# Patient Record
Sex: MALE | Race: WHITE | NOT HISPANIC OR LATINO | Employment: OTHER | URBAN - METROPOLITAN AREA
[De-identification: names, ages, dates, MRNs, and addresses within clinical notes are randomized per-mention and may not be internally consistent; named-entity substitution may affect disease eponyms.]

---

## 2017-08-29 ENCOUNTER — HISTORICAL (OUTPATIENT)
Dept: ADMINISTRATIVE | Facility: HOSPITAL | Age: 82
End: 2017-08-29

## 2021-03-01 ENCOUNTER — HISTORICAL (OUTPATIENT)
Dept: CARDIOLOGY | Facility: HOSPITAL | Age: 86
End: 2021-03-01

## 2021-04-20 ENCOUNTER — HISTORICAL (OUTPATIENT)
Dept: ADMINISTRATIVE | Facility: HOSPITAL | Age: 86
End: 2021-04-20

## 2021-04-20 LAB
ABS NEUT (OLG): 3.18 X10(3)/MCL (ref 2.1–9.2)
BASOPHILS # BLD AUTO: 0 X10(3)/MCL (ref 0–0.2)
BASOPHILS NFR BLD AUTO: 0 %
BUN SERPL-MCNC: 30.5 MG/DL (ref 8.4–25.7)
CALCIUM SERPL-MCNC: 9.5 MG/DL (ref 8.8–10)
CHLORIDE SERPL-SCNC: 108 MMOL/L (ref 98–107)
CHOLEST SERPL-MCNC: 147 MG/DL
CHOLEST/HDLC SERPL: 2 {RATIO} (ref 0–5)
CO2 SERPL-SCNC: 26 MMOL/L (ref 23–31)
CREAT SERPL-MCNC: 1.21 MG/DL (ref 0.73–1.18)
CREAT/UREA NIT SERPL: 25
EOSINOPHIL # BLD AUTO: 0.3 X10(3)/MCL (ref 0–0.9)
EOSINOPHIL NFR BLD AUTO: 6 %
ERYTHROCYTE [DISTWIDTH] IN BLOOD BY AUTOMATED COUNT: 14.2 % (ref 11.5–17)
GLUCOSE SERPL-MCNC: 87 MG/DL (ref 82–115)
HCT VFR BLD AUTO: 43.5 % (ref 42–52)
HDLC SERPL-MCNC: 63 MG/DL (ref 35–60)
HGB BLD-MCNC: 13.4 GM/DL (ref 14–18)
LDLC SERPL CALC-MCNC: 72 MG/DL (ref 50–140)
LYMPHOCYTES # BLD AUTO: 1.6 X10(3)/MCL (ref 0.6–4.6)
LYMPHOCYTES NFR BLD AUTO: 27 %
MCH RBC QN AUTO: 30.9 PG (ref 27–31)
MCHC RBC AUTO-ENTMCNC: 30.8 GM/DL (ref 33–36)
MCV RBC AUTO: 100.5 FL (ref 80–94)
MONOCYTES # BLD AUTO: 0.7 X10(3)/MCL (ref 0.1–1.3)
MONOCYTES NFR BLD AUTO: 12 %
NEUTROPHILS # BLD AUTO: 3.18 X10(3)/MCL (ref 2.1–9.2)
NEUTROPHILS NFR BLD AUTO: 55 %
PLATELET # BLD AUTO: 242 X10(3)/MCL (ref 130–400)
PMV BLD AUTO: 11.9 FL (ref 9.4–12.4)
POTASSIUM SERPL-SCNC: 4.4 MMOL/L (ref 3.5–5.1)
RBC # BLD AUTO: 4.33 X10(6)/MCL (ref 4.7–6.1)
SODIUM SERPL-SCNC: 146 MMOL/L (ref 136–145)
TRIGL SERPL-MCNC: 59 MG/DL (ref 34–140)
VLDLC SERPL CALC-MCNC: 12 MG/DL
WBC # SPEC AUTO: 5.8 X10(3)/MCL (ref 4.5–11.5)

## 2021-04-28 ENCOUNTER — HISTORICAL (OUTPATIENT)
Dept: ADMINISTRATIVE | Facility: HOSPITAL | Age: 86
End: 2021-04-28

## 2021-04-28 LAB
BUN SERPL-MCNC: 23.5 MG/DL (ref 8.4–25.7)
CALCIUM SERPL-MCNC: 9.7 MG/DL (ref 8.8–10)
CHLORIDE SERPL-SCNC: 109 MMOL/L (ref 98–107)
CO2 SERPL-SCNC: 28 MMOL/L (ref 23–31)
CREAT SERPL-MCNC: 1.38 MG/DL (ref 0.73–1.18)
CREAT/UREA NIT SERPL: 17
GLUCOSE SERPL-MCNC: 95 MG/DL (ref 82–115)
POTASSIUM SERPL-SCNC: 4.7 MMOL/L (ref 3.5–5.1)
SODIUM SERPL-SCNC: 145 MMOL/L (ref 136–145)

## 2021-11-05 ENCOUNTER — HISTORICAL (OUTPATIENT)
Dept: LAB | Facility: HOSPITAL | Age: 86
End: 2021-11-05

## 2021-11-05 LAB
BUN SERPL-MCNC: 25.4 MG/DL (ref 8.4–25.7)
CALCIUM SERPL-MCNC: 9.6 MG/DL (ref 8.7–10.5)
CHLORIDE SERPL-SCNC: 106 MMOL/L (ref 98–107)
CO2 SERPL-SCNC: 24 MMOL/L (ref 23–31)
CREAT SERPL-MCNC: 1.36 MG/DL (ref 0.73–1.18)
CREAT/UREA NIT SERPL: 19
GLUCOSE SERPL-MCNC: 93 MG/DL (ref 82–115)
POTASSIUM SERPL-SCNC: 4.4 MMOL/L (ref 3.5–5.1)
SODIUM SERPL-SCNC: 139 MMOL/L (ref 136–145)

## 2022-04-11 ENCOUNTER — HISTORICAL (OUTPATIENT)
Dept: ADMINISTRATIVE | Facility: HOSPITAL | Age: 87
End: 2022-04-11
Payer: COMMERCIAL

## 2022-04-28 VITALS
SYSTOLIC BLOOD PRESSURE: 130 MMHG | HEIGHT: 66 IN | BODY MASS INDEX: 30.18 KG/M2 | DIASTOLIC BLOOD PRESSURE: 58 MMHG | OXYGEN SATURATION: 98 % | WEIGHT: 187.81 LBS

## 2022-04-30 NOTE — OP NOTE
Patient:   Yusef Dodd             MRN: [id number]            FIN: [id number]               Age:   83 years     Sex:  Male     :  10/15/1933   Associated Diagnoses:   None   Author:   Francesco York MD      Operative Note   Operative Information   Surgeon: Francesco York MD.     Preoperative Diagnosis: Nuclear sclerotic cataract                  OS    Postoperative Diagnosis:  Same    Anesthesia:   Local   MAC      The patient was diagnosed with a significant cataract.         OS    The patient desired and I recommended surgical correction with laser-assisted cataract surgery.  After the patient's pupil was dilated and anesthetic drops were placed in the eye, ink marks were placed at 0 degrees, 90 degrees, 180 degrees on the conjunctivae using a titanium marker.     After the patient was transferred to the laser, properly positioned on the laser bed and the plan was reviewed, the Laser Optic Interface (L.O.I.) was aligned with the pre-placed marks and vacuum was employed.  Basic saline solution was used to fill the L.O.I. to the desired degree and the patient was aligned underneath the laser.  The control knob was used to raise the patient and insert the laser lens into the L.O.I.  The monitor was viewed for any bubbles that may interfere and corrections were made if necessary.  The L.O.I. was captured and locked and the eye was scanned.  After approval of the scan, the pre-programmed laser treatment was performed and completed.      The treatment was well-tolerated by the patient without any complications.  The patient was then transferred back to her stretcher and moved to the operating room.     Once in the operating room the patient was properly positioned and the head secured with paper tape.  After the microscope was positioned temporally and focused, the   OS eye was prepped and draped in sterile fashion.  After I was scrubbed, gowned, and gloved I began the operation by creating small  paracentesis port at the corneal limbus to accommodate my second instrument.  Dispersive viscoelastic was then used to fill the anterior chamber.  A Sinsky hook was then used to open the preplaced laser incision.  . The cystatome blade was then used to initiate a capsulorrhexis which was completed 360 degrees with Utrata forceps. BSS in an AC cannula was then used to perform hydrodissection and hydrodilineation. Phacoemulsification was then accomplished by creating a deep groove down the middle of the nucleus, then  it into 2 halves with the phacotip and the Powell chopper and finishing the removal with a stop and chop technique.  The cortex was then removed using the I and A unit. All of the lens material was removed without any tears to the anterior or posterior capsule. Cohesive Viscoelastic was then injected to inflate the capsular bag. A foldable lens was then injected into the capsular bag. The lens was observed to be securely placed into the bag. The I and A was then used to remove the remaining viscoelastic. A 10-0 Biosorb incisional suture was not placed. BSS through an A/C cannula was used to perform stromal hydration in the wound. BSS was also used again to reform the chamber and bring the eye to physiologic IOP.  The wound was checked for leaks and none were found. Copious Vigomox drop and 1-2 drops of, Prednisolone Acetate 1% were placed topically prior to removing the lid specular and drapes.  The drapes were then removed. The patient will be sent to recovery and instructed to use Vigamox, Ketorolac, and Predinsolone Acetate 1% three times a day as well as follow all instructions on the postoperative sheet given to them and explained after surgery. The patient will return to see Dr. York at their scheduled appointment within 24-36 hours after surgery.        SANAM Peralta/danyel

## 2022-05-05 ENCOUNTER — LAB VISIT (OUTPATIENT)
Dept: LAB | Facility: HOSPITAL | Age: 87
End: 2022-05-05
Attending: STUDENT IN AN ORGANIZED HEALTH CARE EDUCATION/TRAINING PROGRAM
Payer: MEDICARE

## 2022-05-05 DIAGNOSIS — E78.5 HYPERLIPIDEMIA, UNSPECIFIED HYPERLIPIDEMIA TYPE: ICD-10-CM

## 2022-05-05 DIAGNOSIS — Z00.00 ROUTINE GENERAL MEDICAL EXAMINATION AT A HEALTH CARE FACILITY: Primary | ICD-10-CM

## 2022-05-05 DIAGNOSIS — N18.2 CHRONIC KIDNEY DISEASE, STAGE II (MILD): ICD-10-CM

## 2022-05-05 DIAGNOSIS — M25.562 LEFT KNEE PAIN: ICD-10-CM

## 2022-05-05 LAB
ANION GAP SERPL CALC-SCNC: 8 MEQ/L
BUN SERPL-MCNC: 34.4 MG/DL (ref 8.4–25.7)
CALCIUM SERPL-MCNC: 9.9 MG/DL (ref 8.8–10)
CHLORIDE SERPL-SCNC: 109 MMOL/L (ref 98–107)
CO2 SERPL-SCNC: 26 MMOL/L (ref 23–31)
CREAT SERPL-MCNC: 1.62 MG/DL (ref 0.73–1.18)
CREAT/UREA NIT SERPL: 21
GLUCOSE SERPL-MCNC: 96 MG/DL (ref 82–115)
POTASSIUM SERPL-SCNC: 4.6 MMOL/L (ref 3.5–5.1)
SODIUM SERPL-SCNC: 143 MMOL/L (ref 136–145)

## 2022-05-05 PROCEDURE — 80048 BASIC METABOLIC PNL TOTAL CA: CPT

## 2022-05-05 PROCEDURE — 36415 COLL VENOUS BLD VENIPUNCTURE: CPT

## 2022-05-18 ENCOUNTER — OFFICE VISIT (OUTPATIENT)
Dept: INTERNAL MEDICINE | Facility: CLINIC | Age: 87
End: 2022-05-18
Payer: MEDICARE

## 2022-05-18 VITALS
BODY MASS INDEX: 27.74 KG/M2 | WEIGHT: 183 LBS | HEIGHT: 68 IN | HEART RATE: 83 BPM | OXYGEN SATURATION: 96 % | RESPIRATION RATE: 18 BRPM | SYSTOLIC BLOOD PRESSURE: 130 MMHG | DIASTOLIC BLOOD PRESSURE: 64 MMHG

## 2022-05-18 DIAGNOSIS — N18.2 STAGE 2 CHRONIC KIDNEY DISEASE: Chronic | ICD-10-CM

## 2022-05-18 DIAGNOSIS — I10 ESSENTIAL HYPERTENSION: ICD-10-CM

## 2022-05-18 DIAGNOSIS — E78.5 HYPERLIPIDEMIA, UNSPECIFIED HYPERLIPIDEMIA TYPE: Primary | Chronic | ICD-10-CM

## 2022-05-18 DIAGNOSIS — M10.9 GOUT, UNSPECIFIED CAUSE, UNSPECIFIED CHRONICITY, UNSPECIFIED SITE: Chronic | ICD-10-CM

## 2022-05-18 PROCEDURE — 99213 PR OFFICE/OUTPT VISIT, EST, LEVL III, 20-29 MIN: ICD-10-PCS | Mod: ,,, | Performed by: STUDENT IN AN ORGANIZED HEALTH CARE EDUCATION/TRAINING PROGRAM

## 2022-05-18 PROCEDURE — 99213 OFFICE O/P EST LOW 20 MIN: CPT | Mod: ,,, | Performed by: STUDENT IN AN ORGANIZED HEALTH CARE EDUCATION/TRAINING PROGRAM

## 2022-05-18 RX ORDER — LISINOPRIL 40 MG/1
40 TABLET ORAL DAILY
COMMUNITY
Start: 2022-02-23 | End: 2022-06-29

## 2022-05-18 RX ORDER — FINASTERIDE 5 MG/1
5 TABLET, FILM COATED ORAL DAILY
COMMUNITY
Start: 2022-03-23 | End: 2022-06-29 | Stop reason: SDUPTHER

## 2022-05-18 RX ORDER — LEVALBUTEROL TARTRATE 45 UG/1
AEROSOL, METERED ORAL
COMMUNITY
Start: 2021-09-13

## 2022-05-18 RX ORDER — PRAVASTATIN SODIUM 20 MG/1
20 TABLET ORAL DAILY
COMMUNITY
Start: 2022-04-02 | End: 2023-02-06

## 2022-05-18 RX ORDER — HYDRALAZINE HYDROCHLORIDE 50 MG/1
50 TABLET, FILM COATED ORAL 2 TIMES DAILY
COMMUNITY
Start: 2022-04-06 | End: 2023-03-06

## 2022-05-18 RX ORDER — ASPIRIN 81 MG/1
81 TABLET ORAL
COMMUNITY
Start: 2021-11-18 | End: 2022-05-18 | Stop reason: SINTOL

## 2022-05-18 RX ORDER — ALLOPURINOL 300 MG/1
300 TABLET ORAL DAILY
COMMUNITY
Start: 2022-03-27 | End: 2022-12-27

## 2022-05-18 RX ORDER — AMLODIPINE BESYLATE 10 MG/1
10 TABLET ORAL DAILY
COMMUNITY
Start: 2022-03-23 | End: 2022-06-29

## 2022-05-18 NOTE — ASSESSMENT & PLAN NOTE
Slight worsening in creatinine  Advised patient to stop taking aspirin and all other NSAIDs  Will recheck BMP in 1 month

## 2022-05-18 NOTE — PROGRESS NOTES
Subjective:      Yusef Dodd  05/18/2022  [id number]    Ayala Williamson MD  Patient Care Team:  Ayala Gray MD as PCP - General (Internal Medicine)          Visit Type:a scheduled routine follow-up visit    Chief Complaint: Follow-up    HPI   Mr Holbrook presents for follow up of labs. Routine BMP shows worsening of CKD. Patient has been taking aspirin, likely the cause. No complaints today.     No past medical history on file.  No past surgical history on file.  No family history on file.  Social History     Tobacco Use    Smoking status: Never Smoker    Smokeless tobacco: Never Used   Substance and Sexual Activity    Alcohol use: Not on file    Drug use: Not on file    Sexual activity: Not on file     Active Problem List with Overview Notes    Diagnosis Date Noted    Hyperlipidemia 05/18/2022    Stage 2 chronic kidney disease 05/18/2022    Gout 05/18/2022    Essential hypertension 05/18/2022     Review of patient's allergies indicates:   Allergen Reactions    Penicillin Nausea And Vomiting       The following were reviewed at this visit: active problem list, medication list, allergies, family history, social history, and health maintenance.    Medications:    Current Outpatient Medications:     allopurinoL (ZYLOPRIM) 300 MG tablet, Take 300 mg by mouth once daily., Disp: , Rfl:     amLODIPine (NORVASC) 10 MG tablet, Take 10 mg by mouth once daily., Disp: , Rfl:     finasteride (PROSCAR) 5 mg tablet, Take 5 mg by mouth once daily., Disp: , Rfl:     hydrALAZINE (APRESOLINE) 50 MG tablet, Take 50 mg by mouth 2 (two) times daily., Disp: , Rfl:     levalbuterol (XOPENEX HFA) 45 mcg/actuation inhaler, Inhale into the lungs., Disp: , Rfl:     lisinopriL (PRINIVIL,ZESTRIL) 40 MG tablet, Take 40 mg by mouth once daily., Disp: , Rfl:     pravastatin (PRAVACHOL) 20 MG tablet, Take 20 mg by mouth once daily., Disp: , Rfl:       Medications have been reviewed and reconciled with patient at  "this visit.  Barriers to medications reviewed with patient.    Adverse reactions to current medications reviewed with patient..    Over the counter medications reviewed and reconciled with patient.  Review of Systems   Constitutional: Negative for chills, diaphoresis, fever and weight loss.   HENT: Negative for congestion, ear discharge, sinus pain and sore throat.    Eyes: Negative for photophobia.   Respiratory: Negative for cough, hemoptysis and shortness of breath.    Cardiovascular: Negative for chest pain, palpitations, claudication, leg swelling and PND.   Gastrointestinal: Negative for constipation, diarrhea, nausea and vomiting.   Genitourinary: Negative for dysuria, frequency and urgency.   Musculoskeletal: Negative for back pain, joint pain, myalgias and neck pain.   Skin: Negative for itching and rash.   Neurological: Negative for dizziness, focal weakness and headaches.   Psychiatric/Behavioral: Negative for depression. The patient does not have insomnia.            Objective:      Vitals:    05/18/22 1320   BP: 130/64   Pulse: 83   Resp: 18   SpO2: 96%   Weight: 83 kg (183 lb)   Height: 5' 8" (1.727 m)       Physical Exam  Constitutional:       Appearance: Normal appearance.   HENT:      Head: Normocephalic and atraumatic.   Cardiovascular:      Rate and Rhythm: Normal rate and regular rhythm.      Pulses: Normal pulses.   Pulmonary:      Effort: Pulmonary effort is normal.      Breath sounds: Normal breath sounds.   Abdominal:      General: Abdomen is flat. Bowel sounds are normal. There is no distension.      Palpations: Abdomen is soft.      Tenderness: There is no abdominal tenderness.   Musculoskeletal:         General: No tenderness. Normal range of motion.      Right lower leg: No edema.      Left lower leg: No edema.   Lymphadenopathy:      Cervical: No cervical adenopathy.   Neurological:      General: No focal deficit present.      Mental Status: He is alert and oriented to person, place, and " time.           Laboratory Reviewed ({Yes)  Lab Results   Component Value Date    WBC 5.8 04/20/2021    HGB 13.4 (L) 04/20/2021    HCT 43.5 04/20/2021     04/20/2021    CHOL 147 04/20/2021    TRIG 59 04/20/2021    HDL 63 (H) 04/20/2021     05/05/2022    K 4.6 05/05/2022    CREATININE 1.62 (H) 05/05/2022    BUN 34.4 (H) 05/05/2022    CO2 26 05/05/2022         Assessment and Plan:       Problem List Items Addressed This Visit        Cardiac/Vascular    Hyperlipidemia - Primary (Chronic)     Continue current medication            Essential hypertension (Chronic)     Controlled  Continue current medications              Renal/    Stage 2 chronic kidney disease (Chronic)     Slight worsening in creatinine  Advised patient to stop taking aspirin and all other NSAIDs  Will recheck BMP in 1 month              Orthopedic    Gout (Chronic)     Continue current medication                   Care Plan/Goals: Reviewed    Goals    None         Follow up: Follow up in about 6 months (around 11/18/2022) for wellness.    No orders of the defined types were placed in this encounter.

## 2022-06-17 ENCOUNTER — LAB VISIT (OUTPATIENT)
Dept: LAB | Facility: HOSPITAL | Age: 87
End: 2022-06-17
Attending: STUDENT IN AN ORGANIZED HEALTH CARE EDUCATION/TRAINING PROGRAM
Payer: MEDICARE

## 2022-06-17 DIAGNOSIS — N18.2 STAGE 2 CHRONIC KIDNEY DISEASE: Chronic | ICD-10-CM

## 2022-06-17 LAB
ANION GAP SERPL CALC-SCNC: 10 MEQ/L
BUN SERPL-MCNC: 28.7 MG/DL (ref 8.4–25.7)
CALCIUM SERPL-MCNC: 9.6 MG/DL (ref 8.8–10)
CHLORIDE SERPL-SCNC: 108 MMOL/L (ref 98–107)
CO2 SERPL-SCNC: 26 MMOL/L (ref 23–31)
CREAT SERPL-MCNC: 1.41 MG/DL (ref 0.73–1.18)
CREAT/UREA NIT SERPL: 20
GLUCOSE SERPL-MCNC: 97 MG/DL (ref 82–115)
POTASSIUM SERPL-SCNC: 4.3 MMOL/L (ref 3.5–5.1)
SODIUM SERPL-SCNC: 144 MMOL/L (ref 136–145)

## 2022-06-17 PROCEDURE — 80048 BASIC METABOLIC PNL TOTAL CA: CPT

## 2022-06-17 PROCEDURE — 36415 COLL VENOUS BLD VENIPUNCTURE: CPT

## 2022-06-29 DIAGNOSIS — I10 ESSENTIAL HYPERTENSION: ICD-10-CM

## 2022-06-29 DIAGNOSIS — E78.5 HYPERLIPIDEMIA, UNSPECIFIED HYPERLIPIDEMIA TYPE: Primary | ICD-10-CM

## 2022-06-29 RX ORDER — LISINOPRIL 40 MG/1
TABLET ORAL
Qty: 30 TABLET | Refills: 0 | Status: SHIPPED | OUTPATIENT
Start: 2022-06-29 | End: 2022-08-25

## 2022-06-29 RX ORDER — AMLODIPINE BESYLATE 10 MG/1
TABLET ORAL
Qty: 90 TABLET | Refills: 0 | Status: SHIPPED | OUTPATIENT
Start: 2022-06-29 | End: 2022-10-25

## 2022-06-29 RX ORDER — FINASTERIDE 5 MG/1
5 TABLET, FILM COATED ORAL DAILY
Qty: 90 TABLET | Refills: 3 | Status: SHIPPED | OUTPATIENT
Start: 2022-06-29 | End: 2023-05-23

## 2022-07-21 DIAGNOSIS — J45.909 ASTHMA, UNSPECIFIED ASTHMA SEVERITY, UNSPECIFIED WHETHER COMPLICATED, UNSPECIFIED WHETHER PERSISTENT: Primary | ICD-10-CM

## 2022-07-21 RX ORDER — ALBUTEROL SULFATE 0.83 MG/ML
SOLUTION RESPIRATORY (INHALATION)
Qty: 300 ML | Refills: 0 | Status: SHIPPED | OUTPATIENT
Start: 2022-07-21 | End: 2022-12-27

## 2022-09-13 ENCOUNTER — HOSPITAL ENCOUNTER (EMERGENCY)
Facility: HOSPITAL | Age: 87
Discharge: HOME | End: 2022-09-13
Attending: SPECIALIST
Payer: MEDICARE

## 2022-09-13 VITALS
TEMPERATURE: 97 F | OXYGEN SATURATION: 97 % | DIASTOLIC BLOOD PRESSURE: 66 MMHG | BODY MASS INDEX: 30.31 KG/M2 | RESPIRATION RATE: 16 BRPM | WEIGHT: 200 LBS | HEART RATE: 98 BPM | HEIGHT: 68 IN | SYSTOLIC BLOOD PRESSURE: 165 MMHG

## 2022-09-13 DIAGNOSIS — M25.569 KNEE PAIN, UNSPECIFIED CHRONICITY, UNSPECIFIED LATERALITY: Primary | ICD-10-CM

## 2022-09-13 DIAGNOSIS — R53.83 FATIGUE, UNSPECIFIED TYPE: ICD-10-CM

## 2022-09-13 PROCEDURE — 99283 EMERGENCY DEPT VISIT LOW MDM: CPT

## 2022-09-13 RX ORDER — MELOXICAM 7.5 MG/1
7.5 TABLET ORAL DAILY PRN
Qty: 14 TABLET | Refills: 0 | Status: SHIPPED | OUTPATIENT
Start: 2022-09-13 | End: 2022-09-27

## 2022-09-14 NOTE — ED PROVIDER NOTES
Encounter Date: 9/13/2022       History     Chief Complaint   Patient presents with    Fatigue     Truck broke down and walked awhile sweating/ fly picked him up brought him home to change clothes then called [facility] because they said he was weak/ pt states he feels find now     Patient brought in by ambulance after his family called stating he was fatigued after his truck broke down and walking a long distance; patient resting in exam room and states he is fine and has no longer fatigue but has some knee pain that is chronic; he received 500 cc of normal saline per EMS      Review of patient's allergies indicates:   Allergen Reactions    Penicillin Nausea And Vomiting     No past medical history on file.  No past surgical history on file.  No family history on file.  Social History     Tobacco Use    Smoking status: Never    Smokeless tobacco: Never     Review of Systems   Constitutional:  Positive for fatigue.   Respiratory: Negative.     Cardiovascular: Negative.    Gastrointestinal: Negative.    Genitourinary: Negative.    Musculoskeletal:  Positive for arthralgias.   Neurological: Negative.      Physical Exam     Initial Vitals [09/13/22 2143]   BP Pulse Resp Temp SpO2   (!) 165/66 98 16 97.4 °F (36.3 °C) 97 %      MAP       --         Physical Exam    Nursing note and vitals reviewed.  Constitutional: He appears well-developed and well-nourished.   HENT:   Head: Normocephalic and atraumatic.   Eyes: EOM are normal. Pupils are equal, round, and reactive to light.   Neck: Neck supple.   Normal range of motion.  Cardiovascular:  Normal rate, regular rhythm and normal heart sounds.           Pulmonary/Chest: Breath sounds normal. No respiratory distress.   Abdominal: Abdomen is soft. Bowel sounds are normal. There is no abdominal tenderness.   Musculoskeletal:         General: Normal range of motion.      Cervical back: Normal range of motion and neck supple.      Comments: Bilateral knee pain with ambulation      Neurological: He is alert and oriented to person, place, and time.   Skin: Skin is warm and dry.       ED Course   Procedures  Labs Reviewed - No data to display       Imaging Results    None          Medications - No data to display                           Clinical Impression:   Final diagnoses:  [M25.569] Knee pain, unspecified chronicity, unspecified laterality (Primary)  [R53.83] Fatigue, unspecified type        ED Disposition Condition    Discharge Stable          ED Prescriptions       Medication Sig Dispense Start Date End Date Auth. Provider    meloxicam (MOBIC) 7.5 MG tablet Take 1 tablet (7.5 mg total) by mouth daily as needed for Pain. 14 tablet 9/13/2022 9/27/2022 Shoaib Dey MD          Follow-up Information       Follow up With Specialties Details Why Contact Info    Ayala Gray MD Internal Medicine In 2 days As needed [address] [facility] [id number]  [phone]               Shoaib Dey MD  09/14/22 6409

## 2022-09-20 DIAGNOSIS — I10 ESSENTIAL HYPERTENSION: ICD-10-CM

## 2022-09-20 RX ORDER — LISINOPRIL 40 MG/1
TABLET ORAL
Qty: 90 TABLET | Refills: 3 | Status: SHIPPED | OUTPATIENT
Start: 2022-09-20 | End: 2023-09-11 | Stop reason: SDUPTHER

## 2022-11-15 DIAGNOSIS — I10 ESSENTIAL HYPERTENSION: Primary | ICD-10-CM

## 2022-11-15 DIAGNOSIS — E78.5 HYPERLIPIDEMIA, UNSPECIFIED HYPERLIPIDEMIA TYPE: ICD-10-CM

## 2022-12-07 ENCOUNTER — OFFICE VISIT (OUTPATIENT)
Dept: INTERNAL MEDICINE | Facility: CLINIC | Age: 87
End: 2022-12-07
Payer: MEDICARE

## 2022-12-07 VITALS
WEIGHT: 180 LBS | RESPIRATION RATE: 18 BRPM | HEIGHT: 68 IN | SYSTOLIC BLOOD PRESSURE: 130 MMHG | OXYGEN SATURATION: 97 % | DIASTOLIC BLOOD PRESSURE: 72 MMHG | BODY MASS INDEX: 27.28 KG/M2 | HEART RATE: 80 BPM

## 2022-12-07 DIAGNOSIS — I10 ESSENTIAL HYPERTENSION: Chronic | ICD-10-CM

## 2022-12-07 DIAGNOSIS — N18.2 STAGE 2 CHRONIC KIDNEY DISEASE: Chronic | ICD-10-CM

## 2022-12-07 DIAGNOSIS — Z00.00 MEDICARE ANNUAL WELLNESS VISIT, SUBSEQUENT: ICD-10-CM

## 2022-12-07 DIAGNOSIS — E78.5 HYPERLIPIDEMIA, UNSPECIFIED HYPERLIPIDEMIA TYPE: Primary | ICD-10-CM

## 2022-12-07 DIAGNOSIS — M10.9 GOUT, UNSPECIFIED CAUSE, UNSPECIFIED CHRONICITY, UNSPECIFIED SITE: Chronic | ICD-10-CM

## 2022-12-07 PROCEDURE — G0439 PR MEDICARE ANNUAL WELLNESS SUBSEQUENT VISIT: ICD-10-PCS | Mod: ,,, | Performed by: STUDENT IN AN ORGANIZED HEALTH CARE EDUCATION/TRAINING PROGRAM

## 2022-12-07 PROCEDURE — G0439 PPPS, SUBSEQ VISIT: HCPCS | Mod: ,,, | Performed by: STUDENT IN AN ORGANIZED HEALTH CARE EDUCATION/TRAINING PROGRAM

## 2022-12-07 RX ORDER — ASPIRIN 81 MG/1
81 TABLET ORAL DAILY
COMMUNITY
End: 2022-12-07

## 2022-12-08 NOTE — PROGRESS NOTES
Subjective:      Yusef Dodd  12/07/2022  [id number]    Ayala Williamson MD  Patient Care Team:  Ayala Gray MD as PCP - General (Internal Medicine)          Visit Type:a scheduled routine follow-up visit    Chief Complaint: Medicare AWV    HPI  Mr Holbrook presents for Medicare wellness. Patient is doing well, denies any complaints.       History reviewed. No pertinent past medical history.  Past Surgical History:   Procedure Laterality Date    HERNIA REPAIR       History reviewed. No pertinent family history.  Social History     Tobacco Use    Smoking status: Never    Smokeless tobacco: Never   Substance and Sexual Activity    Alcohol use: Not on file    Drug use: Not on file    Sexual activity: Not on file     Active Problem List with Overview Notes    Diagnosis Date Noted    Medicare annual wellness visit, subsequent 12/07/2022    Hyperlipidemia 05/18/2022    Stage 2 chronic kidney disease 05/18/2022    Gout 05/18/2022    Essential hypertension 05/18/2022     Review of patient's allergies indicates:   Allergen Reactions    Penicillin Nausea And Vomiting       The following were reviewed at this visit: active problem list, medication list, allergies, family history, social history, and health maintenance.    Medications:    Current Outpatient Medications:     albuterol (PROVENTIL) 2.5 mg /3 mL (0.083 %) nebulizer solution, USE 1 VIAL IN NEBULIZER 4 TIMES DAILY, Disp: 300 mL, Rfl: 0    allopurinoL (ZYLOPRIM) 300 MG tablet, Take 300 mg by mouth once daily., Disp: , Rfl:     amLODIPine (NORVASC) 10 MG tablet, Take 1 tablet by mouth once daily, Disp: 90 tablet, Rfl: 3    finasteride (PROSCAR) 5 mg tablet, Take 1 tablet (5 mg total) by mouth once daily., Disp: 90 tablet, Rfl: 3    hydrALAZINE (APRESOLINE) 50 MG tablet, Take 50 mg by mouth 2 (two) times daily., Disp: , Rfl:     levalbuterol (XOPENEX HFA) 45 mcg/actuation inhaler, Inhale into the lungs., Disp: , Rfl:     lisinopriL (PRINIVIL,ZESTRIL) 40  MG tablet, Take 1 tablet by mouth once daily, Disp: 90 tablet, Rfl: 3    pravastatin (PRAVACHOL) 20 MG tablet, Take 20 mg by mouth once daily., Disp: , Rfl:       Medications have been reviewed and reconciled with patient at this visit.  Barriers to medications reviewed with patient.    Adverse reactions to current medications reviewed with patient..    Over the counter medications reviewed and reconciled with patient.    Opioid Screening: Patient medication list reviewed, patient is not taking prescription opioids. Patient is not using additional opioids than prescribed. Patient is at low risk of substance abuse based on this opioid use history.     Review of Systems   Constitutional:  Negative for chills, diaphoresis, fever and weight loss.   HENT:  Negative for congestion, ear discharge, sinus pain and sore throat.    Eyes:  Negative for photophobia.   Respiratory:  Negative for cough, hemoptysis and shortness of breath.    Cardiovascular:  Negative for chest pain, palpitations, claudication, leg swelling and PND.   Gastrointestinal:  Negative for constipation, diarrhea, nausea and vomiting.   Genitourinary:  Negative for dysuria, frequency and urgency.   Musculoskeletal:  Negative for back pain, joint pain, myalgias and neck pain.   Skin:  Negative for itching and rash.   Neurological:  Negative for dizziness, focal weakness and headaches.   Psychiatric/Behavioral:  Negative for depression. The patient does not have insomnia.      What is your age?: 80 or older  Are you male or female?: Male  During the past four weeks, how much have you been bothered by emotional problems such as feeling anxious, depressed, irritable, sad, or downhearted and blue?: Not at all  During the past five weeks, has your physical and/or emotional health limited your social activities with family, friends, neighbors, or groups?: Not at all  During the past four weeks, how much bodily pain have you generally had?: Very mild pain  During  the past four weeks, was someone available to help if you needed and wanted help?: Yes, as much as I wanted  During the past four weeks, what was the hardest physical activity you could do for at least two minutes?: Moderate  Can you get to places out of walking distance without help?  (For example, can you travel alone on buses or taxis, or drive your own car?): Yes  Can you go shopping for groceries or clothes without someone's help?: Yes  Can you prepare your own meals?: Yes  Can you do your own housework without help?: Yes  Because of any health problems, do you need the help of another person with your personal care needs such as eating, bathing, dressing, or getting around the house?: No  Can you handle your own money without help?: Yes  During the past four weeks, how would you rate your health in general?: Good  How have things been going for you during the past four weeks?: Pretty well  Are you having difficulties driving your car?: No  Do you always fasten your seat belt when you are in a car?: Yes, usually  How often in the past four weeks have you been bothered by falling or dizzy when standing up?: Never  How often in the past four weeks have you been bothered by sexual problems?: Never  How often in the past four weeks have you been bothered by trouble eating well?: Never  How often in the past four weeks have you been bothered by teeth or denture problems?: Never  How often in the past four weeks have you been bothered with problems using the telephone?: Never  How often in the past four weeks have you been bothered by tiredness or fatigue?: Never  Have you fallen two or more times in the past year?: No  Are you afraid of falling?: No  Are you a smoker?: No  During the past four weeks, how many drinks of wine, beer, or other alcoholic beverages did you have?: No alcohol at all  Do you exercise for about 20 minutes three or more days a week?: Yes, some of the time  Have you been given any information  "to help you with hazards in your house that might hurt you?: Yes  Have you been given any information to help you with keeping track of your medications?: Yes  How often do you have trouble taking medicines the way you've been told to take them?: I always take them as prescribed  How confident are you that you can control and manage most of your health problems?: Somewhat confident  What is your race? (Check all that apply.):           Objective:      Vitals:    12/07/22 1409   BP: 130/72   BP Location: Left arm   Pulse: 80   Resp: 18   SpO2: 97%   Weight: 81.6 kg (180 lb)   Height: 5' 8" (1.727 m)       Physical Exam  Constitutional:       Appearance: Normal appearance.   HENT:      Head: Normocephalic and atraumatic.   Cardiovascular:      Rate and Rhythm: Normal rate and regular rhythm.      Pulses: Normal pulses.   Pulmonary:      Effort: Pulmonary effort is normal.      Breath sounds: Normal breath sounds.   Abdominal:      General: Abdomen is flat. Bowel sounds are normal. There is no distension.      Palpations: Abdomen is soft.      Tenderness: There is no abdominal tenderness.   Musculoskeletal:         General: No tenderness. Normal range of motion.      Right lower leg: No edema.      Left lower leg: No edema.   Lymphadenopathy:      Cervical: No cervical adenopathy.   Neurological:      General: No focal deficit present.      Mental Status: He is alert and oriented to person, place, and time.       No flowsheet data found.  Fall Risk Assessment - Outpatient 12/7/2022 5/18/2022   Mobility Status Ambulatory Ambulatory   Number of falls 0 0   Identified as fall risk 0 0                 Depression Screening  Over the past two weeks, has the patient felt down, depressed, or hopeless?: No  Over the past two weeks, has the patient felt little interest or pleasure in doing things?: No  Functional Ability/Safety Screening  Was the patient's timed Up & Go test unsteady or longer than 30 seconds?: No  Does " the patient need help with phone, transportation, shopping, preparing meals, housework, laundry, meds, or managing money?: No  Does the patient's home have rugs in the hallway, lack grab bars in the bathroom, lack handrails on the stairs or have poor lighting?: No  Have you noticed any hearing difficulties?: No  Cognitive Function (Assessed through direct observation with due consideration of information obtained by way of patient reports and/or concerns raised by family, friends, caretakers, or others)    Does the patient repeat questions/statements in the same day?: No  Does the patient have trouble remembering the date, year, and time?: No  Does the patient have difficulty managing finances?: No  Does the patient have a decreased sense of direction?: No        Laboratory Reviewed ({Yes)  Lab Results   Component Value Date    WBC 5.8 04/20/2021    HGB 13.4 (L) 04/20/2021    HCT 43.5 04/20/2021     04/20/2021    CHOL 147 04/20/2021    TRIG 59 04/20/2021    HDL 63 (H) 04/20/2021     06/17/2022    K 4.3 06/17/2022    CREATININE 1.41 (H) 06/17/2022    BUN 28.7 (H) 06/17/2022    CO2 26 06/17/2022         Assessment and Plan:       Problem List Items Addressed This Visit          Cardiac/Vascular    Hyperlipidemia - Primary (Chronic)     Will check lipid panel  Continue current medication          Relevant Orders    Lipid Panel    Essential hypertension (Chronic)     Controlled  Continue current medications             Renal/    Stage 2 chronic kidney disease (Chronic)     BMP after discontinuing aspirin with improvement in creatinine and GFR  Continue to hold aspirin              Orthopedic    Gout (Chronic)     No recent acute crisis  Continue current medications             Other    Medicare annual wellness visit, subsequent     Needs influenza vaccine: will get at pharmacy                  Care Plan/Goals: Reviewed    Goals    None         Follow up: Follow up in about 6 months (around 6/7/2023) for  Bp follow up.    Orders Placed This Encounter   Procedures    Lipid Panel     Standing Status:   Future     Standing Expiration Date:   3/7/2023       Medicare Annual Wellness and Personalized Prevention Plan:   Fall Risk + Home Safety + Hearing Impairment + Depression Screen + Cognitive Impairment Screen + Health Risk Assessment all reviewed.     Health Maintenance Topics with due status: Not Due       Topic Last Completion Date    TETANUS VACCINE 11/29/2016    Lipid Panel 04/20/2021      The patient's Health Maintenance was reviewed and the following appears to be due at this time:   Health Maintenance Due   Topic Date Due    Shingles Vaccine (2 of 3) 01/24/2017    COVID-19 Vaccine (3 - Booster for Moderna series) 07/19/2021    Influenza Vaccine (1) 09/01/2022       Advance Care Planning   I attest to discussing Advance Care Planning with patient and/or family member.  Education was provided including the importance of the Health Care Power of , Advance Directives, and/or [place] documentation.  The patient expressed understanding to the importance of this information and discussion.

## 2022-12-08 NOTE — ASSESSMENT & PLAN NOTE
BMP after discontinuing aspirin with improvement in creatinine and GFR  Continue to hold aspirin

## 2023-03-13 PROBLEM — Z00.00 MEDICARE ANNUAL WELLNESS VISIT, SUBSEQUENT: Status: RESOLVED | Noted: 2022-12-07 | Resolved: 2023-03-13

## 2023-05-23 DIAGNOSIS — E78.5 HYPERLIPIDEMIA, UNSPECIFIED HYPERLIPIDEMIA TYPE: ICD-10-CM

## 2023-05-23 RX ORDER — FINASTERIDE 5 MG/1
TABLET, FILM COATED ORAL
Qty: 90 TABLET | Refills: 0 | Status: SHIPPED | OUTPATIENT
Start: 2023-05-23 | End: 2023-09-11 | Stop reason: SDUPTHER

## 2023-05-23 RX ORDER — PRAVASTATIN SODIUM 20 MG/1
TABLET ORAL
Qty: 90 TABLET | Refills: 0 | Status: SHIPPED | OUTPATIENT
Start: 2023-05-23 | End: 2023-08-09

## 2023-05-23 RX ORDER — HYDRALAZINE HYDROCHLORIDE 50 MG/1
TABLET, FILM COATED ORAL
Qty: 180 TABLET | Refills: 0 | Status: SHIPPED | OUTPATIENT
Start: 2023-05-23 | End: 2023-12-26

## 2023-05-31 ENCOUNTER — TELEPHONE (OUTPATIENT)
Dept: INTERNAL MEDICINE | Facility: CLINIC | Age: 88
End: 2023-05-31
Payer: COMMERCIAL

## 2023-06-07 ENCOUNTER — OFFICE VISIT (OUTPATIENT)
Dept: INTERNAL MEDICINE | Facility: CLINIC | Age: 88
End: 2023-06-07
Payer: MEDICARE

## 2023-06-07 VITALS
RESPIRATION RATE: 18 BRPM | SYSTOLIC BLOOD PRESSURE: 134 MMHG | BODY MASS INDEX: 26.67 KG/M2 | HEIGHT: 68 IN | WEIGHT: 176 LBS | DIASTOLIC BLOOD PRESSURE: 64 MMHG | HEART RATE: 88 BPM | OXYGEN SATURATION: 97 %

## 2023-06-07 DIAGNOSIS — N18.31 CHRONIC KIDNEY DISEASE, STAGE 3A: Primary | ICD-10-CM

## 2023-06-07 DIAGNOSIS — G89.29 CHRONIC PAIN OF RIGHT KNEE: ICD-10-CM

## 2023-06-07 DIAGNOSIS — I10 ESSENTIAL HYPERTENSION: Chronic | ICD-10-CM

## 2023-06-07 DIAGNOSIS — M25.561 CHRONIC PAIN OF RIGHT KNEE: ICD-10-CM

## 2023-06-07 DIAGNOSIS — R32 URINARY INCONTINENCE, UNSPECIFIED TYPE: ICD-10-CM

## 2023-06-07 DIAGNOSIS — E78.5 HYPERLIPIDEMIA, UNSPECIFIED HYPERLIPIDEMIA TYPE: Chronic | ICD-10-CM

## 2023-06-07 PROCEDURE — 99214 PR OFFICE/OUTPT VISIT, EST, LEVL IV, 30-39 MIN: ICD-10-PCS | Mod: ,,, | Performed by: STUDENT IN AN ORGANIZED HEALTH CARE EDUCATION/TRAINING PROGRAM

## 2023-06-07 PROCEDURE — 99214 OFFICE O/P EST MOD 30 MIN: CPT | Mod: ,,, | Performed by: STUDENT IN AN ORGANIZED HEALTH CARE EDUCATION/TRAINING PROGRAM

## 2023-06-08 NOTE — PROGRESS NOTES
Subjective:      Yusef Dodd  06/07/2023  [id number]      Chief Complaint: Follow-up (6 month b/p follow up.weakness )       HPI:  Mr Holbrook presents for 6 months follow up. Patient reports worsening right knee pain that is leading to unsteadiness. Patient's daughter would like referral to Orthopedics. Patient's daughter is also concerned about urinary incontinence, patient does have history of BPH which could be the cause for this.     Past Medical History:   Diagnosis Date    Essential (primary) hypertension      Past Surgical History:   Procedure Laterality Date    HERNIA REPAIR       History reviewed. No pertinent family history.  Social History     Tobacco Use    Smoking status: Never    Smokeless tobacco: Never   Substance and Sexual Activity    Alcohol use: Not on file    Drug use: Not on file    Sexual activity: Not on file     Review of patient's allergies indicates:   Allergen Reactions    Penicillin Nausea And Vomiting       The following were reviewed at this visit: active problem list, medication list, allergies, family history, social history, and health maintenance.    Medications:    Current Outpatient Medications:     albuterol (PROVENTIL) 2.5 mg /3 mL (0.083 %) nebulizer solution, USE 1 VIAL IN NEBULIZER 4 TIMES DAILY, Disp: 300 mL, Rfl: 6    allopurinoL (ZYLOPRIM) 300 MG tablet, Take 1 tablet by mouth once daily, Disp: 90 tablet, Rfl: 3    amLODIPine (NORVASC) 10 MG tablet, Take 1 tablet by mouth once daily, Disp: 90 tablet, Rfl: 3    aspirin 500 MG tablet, Take 500 mg by mouth every 6 (six) hours as needed for Pain., Disp: , Rfl:     finasteride (PROSCAR) 5 mg tablet, Take 1 tablet by mouth once daily, Disp: 90 tablet, Rfl: 0    hydrALAZINE (APRESOLINE) 50 MG tablet, Take 1 tablet by mouth twice daily, Disp: 180 tablet, Rfl: 0    levalbuterol (XOPENEX HFA) 45 mcg/actuation inhaler, Inhale into the lungs., Disp: , Rfl:     lisinopriL (PRINIVIL,ZESTRIL) 40 MG tablet, Take 1 tablet by mouth once  "daily, Disp: 90 tablet, Rfl: 3    pravastatin (PRAVACHOL) 20 MG tablet, Take 1 tablet by mouth once daily, Disp: 90 tablet, Rfl: 0      Medications have been reviewed and reconciled with patient at this visit.  Barriers to medications reviewed with patient.    Adverse reactions to current medications reviewed with patient..    Over the counter medications reviewed and reconciled with patient.  ROS        Objective:      Vitals:    06/07/23 1302   BP: 134/64   BP Location: Left arm   Patient Position: Sitting   BP Method: Large (Automatic)   Pulse: 88   Resp: 18   SpO2: 97%   Weight: 79.8 kg (176 lb)   Height: 5' 8" (1.727 m)       Physical Exam          Assessment and Plan:       1. Chronic kidney disease, stage 3a    2. Essential hypertension  Assessment & Plan:  Controlled  Continue current medications       3. Hyperlipidemia, unspecified hyperlipidemia type  Assessment & Plan:  Continue current medication  Will check lipid panel at next visit       4. Chronic pain of right knee  Assessment & Plan:  Due to OA  Will refer to Orthopedics       5. Urinary incontinence, unspecified type  Assessment & Plan:  Could be related to history of BPH  Will refer to Urologist               Follow up: Follow up in about 6 months (around 12/7/2023) for wellness, Labs check.        "

## 2023-06-27 ENCOUNTER — TELEPHONE (OUTPATIENT)
Dept: ORTHOPEDICS | Facility: CLINIC | Age: 88
End: 2023-06-27
Payer: COMMERCIAL

## 2023-06-27 DIAGNOSIS — M25.561 RIGHT KNEE PAIN, UNSPECIFIED CHRONICITY: Primary | ICD-10-CM

## 2023-07-05 ENCOUNTER — OFFICE VISIT (OUTPATIENT)
Dept: ORTHOPEDICS | Facility: CLINIC | Age: 88
End: 2023-07-05
Payer: MEDICARE

## 2023-07-05 ENCOUNTER — HOSPITAL ENCOUNTER (OUTPATIENT)
Dept: RADIOLOGY | Facility: HOSPITAL | Age: 88
Discharge: HOME | End: 2023-07-05
Attending: ORTHOPAEDIC SURGERY
Payer: MEDICARE

## 2023-07-05 VITALS
SYSTOLIC BLOOD PRESSURE: 150 MMHG | BODY MASS INDEX: 26.67 KG/M2 | TEMPERATURE: 98 F | WEIGHT: 176 LBS | HEART RATE: 78 BPM | DIASTOLIC BLOOD PRESSURE: 67 MMHG | HEIGHT: 68 IN

## 2023-07-05 DIAGNOSIS — M25.561 RIGHT KNEE PAIN, UNSPECIFIED CHRONICITY: ICD-10-CM

## 2023-07-05 DIAGNOSIS — M25.561 CHRONIC PAIN OF RIGHT KNEE: ICD-10-CM

## 2023-07-05 DIAGNOSIS — G89.29 CHRONIC PAIN OF RIGHT KNEE: ICD-10-CM

## 2023-07-05 DIAGNOSIS — M17.11 LOCALIZED OSTEOARTHRITIS OF RIGHT KNEE: Primary | ICD-10-CM

## 2023-07-05 DIAGNOSIS — Z96.9 RETAINED ORTHOPEDIC HARDWARE: ICD-10-CM

## 2023-07-05 PROCEDURE — 73562 X-RAY EXAM OF KNEE 3: CPT | Mod: TC,RT

## 2023-07-05 PROCEDURE — 99204 OFFICE O/P NEW MOD 45 MIN: CPT | Mod: 25,,, | Performed by: ORTHOPAEDIC SURGERY

## 2023-07-05 PROCEDURE — 99204 PR OFFICE/OUTPT VISIT, NEW, LEVL IV, 45-59 MIN: ICD-10-PCS | Mod: 25,,, | Performed by: ORTHOPAEDIC SURGERY

## 2023-07-05 PROCEDURE — 20610 LARGE JOINT ASPIRATION/INJECTION: R KNEE: ICD-10-PCS | Mod: RT,,, | Performed by: ORTHOPAEDIC SURGERY

## 2023-07-05 PROCEDURE — 20610 DRAIN/INJ JOINT/BURSA W/O US: CPT | Mod: RT,,, | Performed by: ORTHOPAEDIC SURGERY

## 2023-07-05 RX ORDER — BETAMETHASONE SODIUM PHOSPHATE AND BETAMETHASONE ACETATE 3; 3 MG/ML; MG/ML
12 INJECTION, SUSPENSION INTRA-ARTICULAR; INTRALESIONAL; INTRAMUSCULAR; SOFT TISSUE
Status: DISCONTINUED | OUTPATIENT
Start: 2023-07-05 | End: 2023-07-05 | Stop reason: HOSPADM

## 2023-07-05 RX ORDER — LIDOCAINE HYDROCHLORIDE 20 MG/ML
3 INJECTION, SOLUTION INFILTRATION; PERINEURAL
Status: DISCONTINUED | OUTPATIENT
Start: 2023-07-05 | End: 2023-07-05 | Stop reason: HOSPADM

## 2023-07-05 RX ADMIN — BETAMETHASONE SODIUM PHOSPHATE AND BETAMETHASONE ACETATE 12 MG: 3; 3 INJECTION, SUSPENSION INTRA-ARTICULAR; INTRALESIONAL; INTRAMUSCULAR; SOFT TISSUE at 08:07

## 2023-07-05 RX ADMIN — LIDOCAINE HYDROCHLORIDE 3 MG: 20 INJECTION, SOLUTION INFILTRATION; PERINEURAL at 08:07

## 2023-07-05 NOTE — PROGRESS NOTES
"Subjective:    CC: Pain of the Right Knee (Right knee pain for serveral months wants to have injection. Uses a cane to ambulate. Takes OTC for the pain does not help. Unable to do exercises due to the pain.)       HPI:  Patient comes in today for his 1st visit.  Patient complains of worsening right knee pain.  He does have a history of severe arthritis.  He is not interested in surgery, he is presently with family.  He continues to have pain with long standing walking and bending not relieved with rest medication.    ROS: Refer to HPI for pertinent ROS. All other 12 point systems negative.    Objective:  Vitals:    07/05/23 0758   BP: (!) 150/67   BP Location: Right arm   Patient Position: Sitting   BP Method: Medium (Automatic)   Pulse: 78   Temp: 97.5 °F (36.4 °C)   Weight: 79.8 kg (176 lb)   Height: 5' 8" (1.727 m)        Physical Exam:  The patient is well-nourished, well-developed and in no apparent distress, pleasant and cooperative. Examination of the right lower extremity compartments are soft and warm. Skin is intact. There are no signs or symptoms of DVT or infection. There is no obvious joint effusion. There is no erythema. Tender to palpation along the medial joint line and patellofemoral joint , right knee range of motion is 10-95, significant varus deformity. The knee is stable to exam with varus and valgus stressing. Negative anterior and posterior drawer. Negative Lachman´s.  Negative Nona's test. Patella grind is positive, Negative for apprehension. Neurovascularly intact distally.    Images:  X-rays three views right knee demonstrates severe tricompartmental osteoarthritis retained intramedullary nail. Images Reviewed and discussed with patient.    Assessment:  1. Localized osteoarthritis of right knee  - Large Joint Aspiration/Injection: R knee    2. Retained orthopedic hardware        Plan:  At this time we discussed his physical exam and x-ray findings.  We have discussed various treatment " options for his right knee arthritis.  He tolerated his steroid injection very well to the right knee.  We have discussed knee exercises, low-impact activities.  I would like see back in 3 months to see how he is progressing.    Follow UP: No follow-ups on file.    Large Joint Aspiration/Injection: R knee    Date/Time: 7/5/2023 8:00 AM  Performed by: Madhu Gomes MD  Authorized by: Madhu Gomes MD     Consent Done?:  Yes (Verbal)  Indications:  Pain  Site marked: the procedure site was marked    Prep: patient was prepped and draped in usual sterile fashion    Approach:  Anterolateral  Location:  Knee  Site:  R knee  Medications:  12 mg betamethasone acetate-betamethasone sodium phosphate 6 mg/mL; 3 mg LIDOcaine HCL 20 mg/ml (2%) 20 mg/mL (2 %)  Patient tolerance:  Patient tolerated the procedure well with no immediate complications

## 2023-07-05 NOTE — PROCEDURES
Large Joint Aspiration/Injection: R knee    Date/Time: 7/5/2023 8:00 AM  Performed by: Madhu Gomse MD  Authorized by: Madhu Gomes MD     Consent Done?:  Yes (Verbal)  Indications:  Pain  Site marked: the procedure site was marked    Prep: patient was prepped and draped in usual sterile fashion    Approach:  Anterolateral  Location:  Knee  Site:  R knee  Medications:  12 mg betamethasone acetate-betamethasone sodium phosphate 6 mg/mL; 3 mg LIDOcaine HCL 20 mg/ml (2%) 20 mg/mL (2 %)  Patient tolerance:  Patient tolerated the procedure well with no immediate complications

## 2023-07-17 ENCOUNTER — TELEPHONE (OUTPATIENT)
Dept: INTERNAL MEDICINE | Facility: CLINIC | Age: 88
End: 2023-07-17
Payer: COMMERCIAL

## 2023-07-17 NOTE — TELEPHONE ENCOUNTER
I have spoken with Karla an confirm the fax number to submit the pts referral. Karla along with myself have verified the correct fax number to submit pts paperwork.

## 2023-07-17 NOTE — TELEPHONE ENCOUNTER
----- Message from Jessica Elizabeth sent at 7/17/2023 10:44 AM CDT -----  .Type:  Needs Medical Advice    Who Called: Karla Ge    Would the patient rather a call back or a response via [employer]? Call back   Best Call Back Number: [phone]  Additional Information: referral wasn't sent to the correct place Fax [phone] for Patti

## 2023-08-09 DIAGNOSIS — E78.5 HYPERLIPIDEMIA, UNSPECIFIED HYPERLIPIDEMIA TYPE: ICD-10-CM

## 2023-08-09 RX ORDER — PRAVASTATIN SODIUM 20 MG/1
TABLET ORAL
Qty: 90 TABLET | Refills: 3 | Status: SHIPPED | OUTPATIENT
Start: 2023-08-09

## 2023-09-08 DIAGNOSIS — I10 ESSENTIAL HYPERTENSION: ICD-10-CM

## 2023-09-08 DIAGNOSIS — E78.5 HYPERLIPIDEMIA, UNSPECIFIED HYPERLIPIDEMIA TYPE: ICD-10-CM

## 2023-09-11 DIAGNOSIS — E78.5 HYPERLIPIDEMIA, UNSPECIFIED HYPERLIPIDEMIA TYPE: ICD-10-CM

## 2023-09-11 DIAGNOSIS — I10 ESSENTIAL HYPERTENSION: ICD-10-CM

## 2023-09-11 RX ORDER — FINASTERIDE 5 MG/1
TABLET, FILM COATED ORAL
Qty: 90 TABLET | Refills: 0 | Status: SHIPPED | OUTPATIENT
Start: 2023-09-11 | End: 2023-12-26

## 2023-09-11 RX ORDER — LISINOPRIL 40 MG/1
40 TABLET ORAL DAILY
Qty: 90 TABLET | Refills: 3 | Status: SHIPPED | OUTPATIENT
Start: 2023-09-11

## 2023-09-11 RX ORDER — LISINOPRIL 40 MG/1
TABLET ORAL
Qty: 90 TABLET | Refills: 0 | Status: SHIPPED | OUTPATIENT
Start: 2023-09-11

## 2023-09-11 RX ORDER — FINASTERIDE 5 MG/1
5 TABLET, FILM COATED ORAL DAILY
Qty: 90 TABLET | Refills: 0 | Status: SHIPPED | OUTPATIENT
Start: 2023-09-11

## 2023-10-18 ENCOUNTER — OFFICE VISIT (OUTPATIENT)
Dept: ORTHOPEDICS | Facility: CLINIC | Age: 88
End: 2023-10-18
Payer: MEDICARE

## 2023-10-18 VITALS — HEIGHT: 68 IN | BODY MASS INDEX: 26.67 KG/M2 | WEIGHT: 176 LBS

## 2023-10-18 DIAGNOSIS — M17.11 LOCALIZED OSTEOARTHRITIS OF RIGHT KNEE: Primary | ICD-10-CM

## 2023-10-18 PROCEDURE — 99214 PR OFFICE/OUTPT VISIT, EST, LEVL IV, 30-39 MIN: ICD-10-PCS | Mod: 25,,, | Performed by: ORTHOPAEDIC SURGERY

## 2023-10-18 PROCEDURE — 99214 OFFICE O/P EST MOD 30 MIN: CPT | Mod: 25,,, | Performed by: ORTHOPAEDIC SURGERY

## 2023-10-18 PROCEDURE — 20610 LARGE JOINT ASPIRATION/INJECTION: R KNEE: ICD-10-PCS | Mod: RT,,, | Performed by: ORTHOPAEDIC SURGERY

## 2023-10-18 PROCEDURE — 20610 DRAIN/INJ JOINT/BURSA W/O US: CPT | Mod: RT,,, | Performed by: ORTHOPAEDIC SURGERY

## 2023-10-18 RX ORDER — BETAMETHASONE SODIUM PHOSPHATE AND BETAMETHASONE ACETATE 3; 3 MG/ML; MG/ML
12 INJECTION, SUSPENSION INTRA-ARTICULAR; INTRALESIONAL; INTRAMUSCULAR; SOFT TISSUE
Status: DISCONTINUED | OUTPATIENT
Start: 2023-10-18 | End: 2023-10-18 | Stop reason: HOSPADM

## 2023-10-18 RX ORDER — ACETAMINOPHEN 500 MG
500 TABLET ORAL EVERY 6 HOURS PRN
COMMUNITY

## 2023-10-18 RX ORDER — LIDOCAINE HYDROCHLORIDE 20 MG/ML
3 INJECTION, SOLUTION INFILTRATION; PERINEURAL
Status: DISCONTINUED | OUTPATIENT
Start: 2023-10-18 | End: 2023-10-18 | Stop reason: HOSPADM

## 2023-10-18 RX ADMIN — BETAMETHASONE SODIUM PHOSPHATE AND BETAMETHASONE ACETATE 12 MG: 3; 3 INJECTION, SUSPENSION INTRA-ARTICULAR; INTRALESIONAL; INTRAMUSCULAR; SOFT TISSUE at 08:10

## 2023-10-18 RX ADMIN — LIDOCAINE HYDROCHLORIDE 3 MG: 20 INJECTION, SOLUTION INFILTRATION; PERINEURAL at 08:10

## 2023-10-18 NOTE — PROGRESS NOTES
"Subjective:    CC: Injections of the Right Knee (F/u R knee inj 7/5/23 - pt states that he is ready for an injection. Previous injection helped lessen the pain. Ambulating with a cane. Pt takes tylenol / aspirin for pain everyday. )       HPI:  Patient returns today for repeat exam.  He is presently with family.  Patient remains be active, he is walking in his yd, using a cane.  Patient states the injections helped a lot.  He is not interested in surgical intervention.    ROS: Refer to HPI for pertinent ROS. All other 12 point systems negative.    Objective:  Vitals:    10/18/23 0826   Weight: 79.8 kg (176 lb)   Height: 5' 8" (1.727 m)        Physical Exam:  The patient is well-nourished, well-developed and in no apparent distress, pleasant and cooperative. Examination of the right lower extremity compartments are soft and warm. Skin is intact. There are no signs or symptoms of DVT or infection. There is no obvious joint effusion. There is no erythema. Tender to palpation along the medial joint line, varus deformity , right knee range of motion is 5-100. The knee is stable to exam with varus and valgus stressing. Negative anterior and posterior drawer. Negative Lachman´s.  Negative Nona's test. Patella grind is positive, Negative for apprehension. Neurovascularly intact distally.    Images: . Images Reviewed and discussed with patient.    Assessment:  1. Localized osteoarthritis of right knee  - Large Joint Aspiration/Injection: R knee        Plan:  At this time we discussed his physical exam and previous imaging findings.  We have discussed low-impact activities, he tolerated his steroid injection very well to the right knee.  Hold off on surgical intervention, I would like see back in 3 months for repeat exam if needed.    Follow UP: No follow-ups on file.    Large Joint Aspiration/Injection: R knee    Date/Time: 10/18/2023 8:00 AM    Performed by: Madhu Gomes MD  Authorized by: Madhu Gomes MD  "   Consent Done?:  Yes (Verbal)  Indications:  Pain  Site marked: the procedure site was marked    Prep: patient was prepped and draped in usual sterile fashion    Approach:  Anterolateral  Location:  Knee  Site:  R knee  Medications:  12 mg betamethasone acetate-betamethasone sodium phosphate 6 mg/mL; 3 mg LIDOcaine HCL 20 mg/ml (2%) 20 mg/mL (2 %)  Patient tolerance:  Patient tolerated the procedure well with no immediate complications

## 2023-10-26 ENCOUNTER — TELEPHONE (OUTPATIENT)
Dept: INTERNAL MEDICINE | Facility: CLINIC | Age: 88
End: 2023-10-26
Payer: COMMERCIAL

## 2023-10-26 DIAGNOSIS — J45.909 ASTHMA, UNSPECIFIED ASTHMA SEVERITY, UNSPECIFIED WHETHER COMPLICATED, UNSPECIFIED WHETHER PERSISTENT: ICD-10-CM

## 2023-10-26 RX ORDER — ALBUTEROL SULFATE 0.83 MG/ML
2.5 SOLUTION RESPIRATORY (INHALATION) EVERY 4 HOURS
Qty: 300 ML | Refills: 6 | Status: SHIPPED | OUTPATIENT
Start: 2023-10-26

## 2023-10-26 RX ORDER — ALBUTEROL SULFATE 0.83 MG/ML
2.5 SOLUTION RESPIRATORY (INHALATION) DAILY PRN
Qty: 300 ML | Refills: 6 | Status: SHIPPED | OUTPATIENT
Start: 2023-10-26 | End: 2023-10-26 | Stop reason: SDUPTHER

## 2023-10-26 RX ORDER — ALBUTEROL SULFATE 0.83 MG/ML
SOLUTION RESPIRATORY (INHALATION)
Qty: 300 ML | Refills: 6 | Status: SHIPPED | OUTPATIENT
Start: 2023-10-26 | End: 2023-10-26 | Stop reason: SDUPTHER

## 2023-10-26 NOTE — TELEPHONE ENCOUNTER
----- Message from Hector Efliciano sent at 10/26/2023  2:14 PM CDT -----  .Type:  Pharmacy Calling to Clarify an RX    Name of Caller:Nitza  Pharmacy Name:[facility] - [place]  [address]  Prescription Name:albuterol (PROVENTIL) 2.5 mg /3 mL (0.083 %) nebulizer solution  What do they need to clarify?:prescription  Best Call Back Number:[phone]  Additional Information:

## 2023-11-30 DIAGNOSIS — Z00.00 WELL ADULT EXAM: Primary | ICD-10-CM

## 2023-11-30 DIAGNOSIS — I10 ESSENTIAL HYPERTENSION: ICD-10-CM

## 2023-11-30 DIAGNOSIS — E78.5 HYPERLIPIDEMIA, UNSPECIFIED HYPERLIPIDEMIA TYPE: ICD-10-CM

## 2023-12-07 ENCOUNTER — OFFICE VISIT (OUTPATIENT)
Dept: INTERNAL MEDICINE | Facility: CLINIC | Age: 88
End: 2023-12-07
Payer: MEDICARE

## 2023-12-07 VITALS
OXYGEN SATURATION: 99 % | HEART RATE: 81 BPM | BODY MASS INDEX: 27.89 KG/M2 | WEIGHT: 184 LBS | SYSTOLIC BLOOD PRESSURE: 118 MMHG | DIASTOLIC BLOOD PRESSURE: 60 MMHG | HEIGHT: 68 IN

## 2023-12-07 DIAGNOSIS — E78.5 HYPERLIPIDEMIA, UNSPECIFIED HYPERLIPIDEMIA TYPE: Chronic | ICD-10-CM

## 2023-12-07 DIAGNOSIS — I10 ESSENTIAL HYPERTENSION: Chronic | ICD-10-CM

## 2023-12-07 DIAGNOSIS — Z00.00 MEDICARE ANNUAL WELLNESS VISIT, SUBSEQUENT: ICD-10-CM

## 2023-12-07 DIAGNOSIS — Z23 NEED FOR INFLUENZA VACCINATION: Primary | ICD-10-CM

## 2023-12-07 PROCEDURE — G0439 PR MEDICARE ANNUAL WELLNESS SUBSEQUENT VISIT: ICD-10-PCS | Mod: ,,, | Performed by: STUDENT IN AN ORGANIZED HEALTH CARE EDUCATION/TRAINING PROGRAM

## 2023-12-07 PROCEDURE — G0439 PPPS, SUBSEQ VISIT: HCPCS | Mod: ,,, | Performed by: STUDENT IN AN ORGANIZED HEALTH CARE EDUCATION/TRAINING PROGRAM

## 2023-12-07 NOTE — PROGRESS NOTES
Subjective:      Yusef Dodd  12/22/2023  [id number]    Ayala Villagomez MD  Patient Care Team:  Ayala Villagomez MD as PCP - General (Internal Medicine)          Visit Type:a scheduled routine follow-up visit    Chief Complaint: Medicare AWV    HPI  Mr Holbrook presents for Medicare wellness visit. Patient is here with his daughter who states patient has been doing well.     Past Medical History:   Diagnosis Date    Essential (primary) hypertension     Gout     High cholesterol      Past Surgical History:   Procedure Laterality Date    HERNIA REPAIR      HIP SURGERY      fracture of femur     Family History   Family history unknown: Yes     Social History     Tobacco Use    Smoking status: Never    Smokeless tobacco: Never   Substance and Sexual Activity    Alcohol use: Not Currently    Drug use: Never    Sexual activity: Not Currently     Active Problem List with Overview Notes    Diagnosis Date Noted    Chronic kidney disease, stage 3a 06/07/2023    Chronic pain of right knee 06/07/2023    Urinary incontinence 06/07/2023    Medicare annual wellness visit, subsequent 12/07/2022    Hyperlipidemia 05/18/2022    Stage 2 chronic kidney disease 05/18/2022    Gout 05/18/2022    Essential hypertension 05/18/2022     Review of patient's allergies indicates:   Allergen Reactions    Penicillin Nausea And Vomiting       The following were reviewed at this visit: active problem list, medication list, allergies, family history, social history, and health maintenance.    Medications:    Current Outpatient Medications:     acetaminophen (TYLENOL) 500 MG tablet, Take 500 mg by mouth every 6 (six) hours as needed for Pain., Disp: , Rfl:     albuterol (PROVENTIL) 2.5 mg /3 mL (0.083 %) nebulizer solution, Take 3 mLs (2.5 mg total) by nebulization every 4 (four) hours. Rescue, Disp: 300 mL, Rfl: 6    allopurinoL (ZYLOPRIM) 300 MG tablet, Take 1 tablet by mouth once daily, Disp: 90 tablet, Rfl: 3    amLODIPine (NORVASC)  10 MG tablet, Take 1 tablet by mouth once daily, Disp: 90 tablet, Rfl: 3    aspirin 500 MG tablet, Take 500 mg by mouth every 6 (six) hours as needed for Pain., Disp: , Rfl:     finasteride (PROSCAR) 5 mg tablet, Take 1 tablet by mouth once daily, Disp: 90 tablet, Rfl: 0    finasteride (PROSCAR) 5 mg tablet, Take 1 tablet (5 mg total) by mouth once daily., Disp: 90 tablet, Rfl: 0    hydrALAZINE (APRESOLINE) 50 MG tablet, Take 1 tablet by mouth twice daily, Disp: 180 tablet, Rfl: 0    levalbuterol (XOPENEX HFA) 45 mcg/actuation inhaler, Inhale into the lungs., Disp: , Rfl:     lisinopriL (PRINIVIL,ZESTRIL) 40 MG tablet, Take 1 tablet by mouth once daily, Disp: 90 tablet, Rfl: 0    lisinopriL (PRINIVIL,ZESTRIL) 40 MG tablet, Take 1 tablet (40 mg total) by mouth once daily., Disp: 90 tablet, Rfl: 3    pravastatin (PRAVACHOL) 20 MG tablet, Take 1 tablet by mouth once daily, Disp: 90 tablet, Rfl: 3      Medications have been reviewed and reconciled with patient at this visit.  Barriers to medications reviewed with patient.    Adverse reactions to current medications reviewed with patient..    Over the counter medications reviewed and reconciled with patient.    Opioid Screening: Patient medication list reviewed, patient is not taking prescription opioids. Patient is not using additional opioids than prescribed. Patient is not at low risk of substance abuse based on this opioid use history.     Review of Systems   Constitutional:  Negative for chills, diaphoresis, fever and weight loss.   HENT:  Negative for congestion, ear discharge, sinus pain and sore throat.    Eyes:  Negative for photophobia.   Respiratory:  Negative for cough, hemoptysis and shortness of breath.    Cardiovascular:  Negative for chest pain, palpitations, claudication, leg swelling and PND.   Gastrointestinal:  Negative for constipation, diarrhea, nausea and vomiting.   Genitourinary:  Negative for dysuria, frequency and urgency.   Musculoskeletal:   "Negative for back pain, joint pain, myalgias and neck pain.   Skin:  Negative for itching and rash.   Neurological:  Negative for dizziness, focal weakness and headaches.   Psychiatric/Behavioral:  Negative for depression. The patient does not have insomnia.                   Objective:      Vitals:    12/07/23 1303   BP: 118/60   Pulse: 81   SpO2: 99%   Weight: 83.5 kg (184 lb)   Height: 5' 8" (1.727 m)       Physical Exam  Constitutional:       Appearance: Normal appearance.   HENT:      Head: Normocephalic and atraumatic.   Cardiovascular:      Rate and Rhythm: Normal rate and regular rhythm.      Pulses: Normal pulses.   Pulmonary:      Effort: Pulmonary effort is normal.      Breath sounds: Normal breath sounds.   Abdominal:      General: Abdomen is flat. Bowel sounds are normal. There is no distension.      Palpations: Abdomen is soft.      Tenderness: There is no abdominal tenderness.   Musculoskeletal:         General: No tenderness. Normal range of motion.      Right lower leg: No edema.      Left lower leg: No edema.   Lymphadenopathy:      Cervical: No cervical adenopathy.   Neurological:      General: No focal deficit present.      Mental Status: He is alert and oriented to person, place, and time.              No data to display                  12/7/2023     1:00 PM 10/18/2023     8:00 AM 7/5/2023     8:00 AM 6/7/2023     1:00 PM 12/7/2022     2:00 PM 5/18/2022     1:20 PM   Fall Risk Assessment - Outpatient   Mobility Status Ambulatory Ambulatory w/ assistance Ambulatory w/ assistance Ambulatory Ambulatory Ambulatory   Number of falls 0 0 1 0 0 0   Identified as fall risk False True True False False False                          Laboratory Reviewed ({Yes)  Lab Results   Component Value Date    WBC 5.8 04/20/2021    HGB 13.4 (L) 04/20/2021    HCT 43.5 04/20/2021     04/20/2021    CHOL 147 04/20/2021    TRIG 59 04/20/2021    HDL 63 (H) 04/20/2021     06/17/2022    K 4.3 06/17/2022    " CREATININE 1.41 (H) 06/17/2022    BUN 28.7 (H) 06/17/2022    CO2 26 06/17/2022         Assessment and Plan:       Problem List Items Addressed This Visit          Cardiac/Vascular    Hyperlipidemia (Chronic)     Continue pravastatin           Essential hypertension (Chronic)     Controlled  Continue amlodipine, hydralazine and lisinopril              Other    Medicare annual wellness visit, subsequent     Up to date with age appropriate screenings  Needs to go do labs           Other Visit Diagnoses       Need for influenza vaccination    -  Primary    Relevant Orders    Influenza - Quadrivalent (Adjuvanted)              Care Plan/Goals: Reviewed    Goals    None         Follow up: Follow up in about 6 months (around 6/7/2024) for Bp follow up.    Orders Placed This Encounter   Procedures    Influenza - Quadrivalent (Adjuvanted)       Medicare Annual Wellness and Personalized Prevention Plan:   Fall Risk + Home Safety + Hearing Impairment + Depression Screen + Cognitive Impairment Screen + Health Risk Assessment all reviewed.     Health Maintenance Topics with due status: Not Due       Topic Last Completion Date    TETANUS VACCINE 11/29/2016    Lipid Panel 04/20/2021      The patient's Health Maintenance was reviewed and the following appears to be due at this time:   Health Maintenance Due   Topic Date Due    RSV Vaccine (Age 60+ and Pregnant patients) (1 - 1-dose 60+ series) Never done    Shingles Vaccine (2 of 3) 01/24/2017    Influenza Vaccine (1) 09/01/2023    COVID-19 Vaccine (3 - 2023-24 season) 09/01/2023       Advance Care Planning   I attest to discussing Advance Care Planning with patient and/or family member.  Education was provided including the importance of the Health Care Power of , Advance Directives, and/or [place] documentation.  The patient expressed understanding to the importance of this information and discussion.

## 2023-12-22 DIAGNOSIS — E78.5 HYPERLIPIDEMIA, UNSPECIFIED HYPERLIPIDEMIA TYPE: ICD-10-CM

## 2023-12-22 DIAGNOSIS — J45.909 ASTHMA, UNSPECIFIED ASTHMA SEVERITY, UNSPECIFIED WHETHER COMPLICATED, UNSPECIFIED WHETHER PERSISTENT: ICD-10-CM

## 2023-12-22 DIAGNOSIS — I10 ESSENTIAL HYPERTENSION: ICD-10-CM

## 2023-12-26 PROCEDURE — G0008 FLU VACCINE - QUADRIVALENT - ADJUVANTED: ICD-10-PCS | Mod: ,,, | Performed by: STUDENT IN AN ORGANIZED HEALTH CARE EDUCATION/TRAINING PROGRAM

## 2023-12-26 PROCEDURE — 90694 FLU VACCINE - QUADRIVALENT - ADJUVANTED: ICD-10-PCS | Mod: ,,, | Performed by: STUDENT IN AN ORGANIZED HEALTH CARE EDUCATION/TRAINING PROGRAM

## 2023-12-26 PROCEDURE — G0008 ADMIN INFLUENZA VIRUS VAC: HCPCS | Mod: ,,, | Performed by: STUDENT IN AN ORGANIZED HEALTH CARE EDUCATION/TRAINING PROGRAM

## 2023-12-26 PROCEDURE — 90694 VACC AIIV4 NO PRSRV 0.5ML IM: CPT | Mod: ,,, | Performed by: STUDENT IN AN ORGANIZED HEALTH CARE EDUCATION/TRAINING PROGRAM

## 2023-12-26 RX ORDER — ALLOPURINOL 300 MG/1
TABLET ORAL
Qty: 90 TABLET | Refills: 0 | Status: SHIPPED | OUTPATIENT
Start: 2023-12-26

## 2023-12-26 RX ORDER — HYDRALAZINE HYDROCHLORIDE 50 MG/1
TABLET, FILM COATED ORAL
Qty: 180 TABLET | Refills: 0 | Status: SHIPPED | OUTPATIENT
Start: 2023-12-26

## 2023-12-26 RX ORDER — AMLODIPINE BESYLATE 10 MG/1
TABLET ORAL
Qty: 90 TABLET | Refills: 0 | Status: SHIPPED | OUTPATIENT
Start: 2023-12-26

## 2023-12-26 RX ORDER — FINASTERIDE 5 MG/1
TABLET, FILM COATED ORAL
Qty: 90 TABLET | Refills: 0 | Status: SHIPPED | OUTPATIENT
Start: 2023-12-26

## 2024-01-01 ENCOUNTER — TELEPHONE (OUTPATIENT)
Dept: INTERNAL MEDICINE | Facility: CLINIC | Age: 89
End: 2024-01-01
Payer: MEDICARE

## 2024-03-25 PROBLEM — Z00.00 MEDICARE ANNUAL WELLNESS VISIT, SUBSEQUENT: Status: RESOLVED | Noted: 2022-12-07 | Resolved: 2024-03-25

## 2024-05-05 ENCOUNTER — HOSPITAL ENCOUNTER (EMERGENCY)
Facility: HOSPITAL | Age: 89
Discharge: HOME | End: 2024-05-05
Attending: SPECIALIST
Payer: MEDICARE

## 2024-05-05 VITALS
SYSTOLIC BLOOD PRESSURE: 147 MMHG | DIASTOLIC BLOOD PRESSURE: 62 MMHG | RESPIRATION RATE: 19 BRPM | TEMPERATURE: 99 F | BODY MASS INDEX: 27.89 KG/M2 | HEART RATE: 74 BPM | WEIGHT: 184 LBS | OXYGEN SATURATION: 99 % | HEIGHT: 68 IN

## 2024-05-05 DIAGNOSIS — N17.9 AKI (ACUTE KIDNEY INJURY): ICD-10-CM

## 2024-05-05 DIAGNOSIS — R53.1 WEAKNESS: ICD-10-CM

## 2024-05-05 DIAGNOSIS — E86.0 DEHYDRATION: Primary | ICD-10-CM

## 2024-05-05 LAB
ALBUMIN SERPL-MCNC: 3.6 G/DL (ref 3.4–4.8)
ALBUMIN/GLOB SERPL: 1.1 RATIO (ref 1.1–2)
ALP SERPL-CCNC: 96 UNIT/L (ref 40–150)
ALT SERPL-CCNC: 13 UNIT/L (ref 0–55)
APPEARANCE UR: CLEAR
AST SERPL-CCNC: 23 UNIT/L (ref 5–34)
BACTERIA #/AREA URNS AUTO: ABNORMAL /HPF
BASOPHILS # BLD AUTO: 0.01 X10(3)/MCL
BASOPHILS NFR BLD AUTO: 0.2 %
BILIRUB SERPL-MCNC: 0.7 MG/DL
BILIRUB UR QL STRIP.AUTO: NEGATIVE
BUN SERPL-MCNC: 37.7 MG/DL (ref 8.4–25.7)
CALCIUM SERPL-MCNC: 9.6 MG/DL (ref 8.8–10)
CHLORIDE SERPL-SCNC: 109 MMOL/L (ref 98–111)
CO2 SERPL-SCNC: 20 MMOL/L (ref 23–31)
COLOR UR AUTO: YELLOW
CREAT SERPL-MCNC: 2.41 MG/DL (ref 0.73–1.18)
EOSINOPHIL # BLD AUTO: 0.23 X10(3)/MCL (ref 0–0.9)
EOSINOPHIL NFR BLD AUTO: 3.6 %
ERYTHROCYTE [DISTWIDTH] IN BLOOD BY AUTOMATED COUNT: 13.2 % (ref 11.5–17)
GFR SERPLBLD CREATININE-BSD FMLA CKD-EPI: 25 MLS/MIN/1.73/M2
GLOBULIN SER-MCNC: 3.4 GM/DL (ref 2.4–3.5)
GLUCOSE SERPL-MCNC: 104 MG/DL (ref 75–121)
GLUCOSE UR QL STRIP.AUTO: NEGATIVE
HCT VFR BLD AUTO: 39.4 % (ref 42–52)
HGB BLD-MCNC: 12.6 G/DL (ref 14–18)
HYALINE CASTS URNS QL MICRO: ABNORMAL /LPF
IMM GRANULOCYTES # BLD AUTO: 0.02 X10(3)/MCL (ref 0–0.04)
IMM GRANULOCYTES NFR BLD AUTO: 0.3 %
KETONES UR QL STRIP.AUTO: ABNORMAL
LEUKOCYTE ESTERASE UR QL STRIP.AUTO: NEGATIVE
LYMPHOCYTES # BLD AUTO: 1.29 X10(3)/MCL (ref 0.6–4.6)
LYMPHOCYTES NFR BLD AUTO: 20.2 %
MCH RBC QN AUTO: 30.1 PG (ref 27–31)
MCHC RBC AUTO-ENTMCNC: 32 G/DL (ref 33–36)
MCV RBC AUTO: 94 FL (ref 80–94)
MONOCYTES # BLD AUTO: 0.67 X10(3)/MCL (ref 0.1–1.3)
MONOCYTES NFR BLD AUTO: 10.5 %
NEUTROPHILS # BLD AUTO: 4.18 X10(3)/MCL (ref 2.1–9.2)
NEUTROPHILS NFR BLD AUTO: 65.2 %
NITRITE UR QL STRIP.AUTO: NEGATIVE
PH UR STRIP.AUTO: 5.5 [PH]
PLATELET # BLD AUTO: 229 X10(3)/MCL (ref 130–400)
PMV BLD AUTO: 10.6 FL (ref 7.4–10.4)
POTASSIUM SERPL-SCNC: 4.3 MMOL/L (ref 3.5–5.1)
PROT SERPL-MCNC: 7 GM/DL (ref 5.8–7.6)
PROT UR QL STRIP.AUTO: ABNORMAL
RBC # BLD AUTO: 4.19 X10(6)/MCL (ref 4.7–6.1)
RBC #/AREA URNS AUTO: ABNORMAL /HPF
RBC UR QL AUTO: NEGATIVE
SODIUM SERPL-SCNC: 139 MMOL/L (ref 132–146)
SP GR UR STRIP.AUTO: 1.02 (ref 1–1.03)
SQUAMOUS #/AREA URNS AUTO: ABNORMAL /HPF
UROBILINOGEN UR STRIP-ACNC: 1
WBC # SPEC AUTO: 6.4 X10(3)/MCL (ref 4.5–11.5)
WBC #/AREA URNS AUTO: ABNORMAL /HPF

## 2024-05-05 PROCEDURE — 99284 EMERGENCY DEPT VISIT MOD MDM: CPT | Mod: 25

## 2024-05-05 PROCEDURE — 25000003 PHARM REV CODE 250: Performed by: SPECIALIST

## 2024-05-05 PROCEDURE — 93010 ELECTROCARDIOGRAM REPORT: CPT | Mod: ,,, | Performed by: INTERNAL MEDICINE

## 2024-05-05 PROCEDURE — 80053 COMPREHEN METABOLIC PANEL: CPT | Performed by: SPECIALIST

## 2024-05-05 PROCEDURE — 85025 COMPLETE CBC W/AUTO DIFF WBC: CPT | Performed by: SPECIALIST

## 2024-05-05 PROCEDURE — 93005 ELECTROCARDIOGRAM TRACING: CPT

## 2024-05-05 PROCEDURE — 96360 HYDRATION IV INFUSION INIT: CPT

## 2024-05-05 PROCEDURE — 81001 URINALYSIS AUTO W/SCOPE: CPT | Performed by: SPECIALIST

## 2024-05-05 RX ADMIN — SODIUM CHLORIDE 500 ML: 9 INJECTION, SOLUTION INTRAVENOUS at 08:05

## 2024-05-06 LAB
OHS QRS DURATION: 86 MS
OHS QTC CALCULATION: 433 MS

## 2024-05-06 NOTE — ED PROVIDER NOTES
Encounter Date: 5/5/2024       History     Chief Complaint   Patient presents with    Fatigue     Arrives via EMS. Per family pt has been having intermittent weakness over the last couple of days. They say he is forgetful.  They said today he went to the store and when he got home, neighbors found him sitting on his steps with his truck still running.   They said his bp was low at this time.  He tells me he feels fine. Denies complaints other than right knee pain. Moving all extremities equally, no slurred speech or facial droop.      Patient brought in by EMS and family reports he has been fatigued lately and that he gets cold and turns off his air conditioner and then ends up getting hot and sweating a lot inside his home; patient states he feels fine; patient's daughter states he has had episodes where he gets confused and forgetful with intermittent weakness    The history is provided by the patient and a relative (Daughter).     Review of patient's allergies indicates:   Allergen Reactions    Penicillin Nausea And Vomiting     Past Medical History:   Diagnosis Date    Essential (primary) hypertension     Gout     High cholesterol      Past Surgical History:   Procedure Laterality Date    HERNIA REPAIR      HIP SURGERY      fracture of femur     Family History   Family history unknown: Yes     Social History     Tobacco Use    Smoking status: Never    Smokeless tobacco: Never   Substance Use Topics    Alcohol use: Not Currently    Drug use: Never     Review of Systems   Constitutional: Negative.    HENT: Negative.     Respiratory: Negative.     Cardiovascular: Negative.    Gastrointestinal: Negative.    Genitourinary: Negative.    Musculoskeletal:  Positive for arthralgias.   Neurological:  Positive for weakness.       Physical Exam     Initial Vitals [05/05/24 1844]   BP Pulse Resp Temp SpO2   (!) 146/61 84 18 99.2 °F (37.3 °C) 98 %      MAP       --         Physical Exam    Nursing note and vitals  reviewed.  Constitutional: He appears well-developed and well-nourished.   HENT:   Head: Normocephalic and atraumatic.   Mouth/Throat: Oropharynx is clear and moist.   Eyes: EOM are normal. Pupils are equal, round, and reactive to light.   Neck: Neck supple. No thyromegaly present.   Normal range of motion.  Cardiovascular:  Normal rate, regular rhythm and normal heart sounds.           Pulmonary/Chest: Breath sounds normal.   Abdominal: Abdomen is soft. There is no abdominal tenderness. There is no guarding.   Musculoskeletal:         General: Normal range of motion.      Cervical back: Normal range of motion and neck supple.     Neurological: He is alert and oriented to person, place, and time. He has normal strength. No cranial nerve deficit. GCS score is 15. GCS eye subscore is 4. GCS verbal subscore is 5. GCS motor subscore is 6.   Skin: Skin is warm and dry.         ED Course   Procedures  Labs Reviewed   COMPREHENSIVE METABOLIC PANEL - Abnormal; Notable for the following components:       Result Value    Carbon Dioxide 20 (*)     Blood Urea Nitrogen 37.7 (*)     Creatinine 2.41 (*)     All other components within normal limits   URINALYSIS, REFLEX TO URINE CULTURE - Abnormal; Notable for the following components:    Protein, UA Trace (*)     Ketones, UA Trace (*)     All other components within normal limits   CBC WITH DIFFERENTIAL - Abnormal; Notable for the following components:    RBC 4.19 (*)     Hgb 12.6 (*)     Hct 39.4 (*)     MCHC 32.0 (*)     MPV 10.6 (*)     All other components within normal limits   URINALYSIS, MICROSCOPIC - Abnormal; Notable for the following components:    Bacteria, UA Few (*)     Hyaline Casts, UA Many (*)     WBC, UA 6-10 (*)     All other components within normal limits   CBC W/ AUTO DIFFERENTIAL    Narrative:     The following orders were created for panel order CBC auto differential.  Procedure                               Abnormality         Status                      ---------                               -----------         ------                     CBC with Differential[[id number]]       Abnormal            Final result                 Please view results for these tests on the individual orders.     EKG Readings: (Independently Interpreted)   Rhythm: Normal Sinus Rhythm. Heart Rate: 80. Ectopy: No Ectopy. Conduction: Normal. T Waves: Normal. Clinical Impression: Normal Sinus Rhythm   Nonspecific ST abnormality       Imaging Results    None          Medications   sodium chloride 0.9% bolus 500 mL 500 mL (500 mLs Intravenous New Bag 5/5/24 2000)     Medical Decision Making  Patient brought in by EMS and family reports he has been fatigued lately and that he gets cold and turns off his air conditioner and then ends up getting hot and sweating a lot inside his home; patient states he feels fine; patient's daughter states he has had episodes where he gets confused and forgetful with intermittent weakness    DIFFERENTIAL DIAGNOSIS- dementia, dehydration, electrolyte abnormality, UTI    Amount and/or Complexity of Data Reviewed  External Data Reviewed: labs and notes.  Labs: ordered. Decision-making details documented in ED Course.  ECG/medicine tests: ordered and independent interpretation performed. Decision-making details documented in ED Course.    Risk  Risk Details: Patient had evidence of dehydration with prerenal azotemia and acute kidney injury on his lab work compared to previous labs; patient's history reveals he has been sweating a lot while being inside a warm home and I discussed this with his daughter regarding hydration and recheck his electrolytes and kidney function within the next week; encouraged hydration; patient was given 500 mL of normal saline prior to discharge and had no complaints                   Patient Vitals for the past 24 hrs:   BP Temp Pulse Resp SpO2 Height Weight   05/05/24 1932 (!) 147/62 -- 74 19 99 % -- --   05/05/24 1902 (!) 157/82 -- 77  "-- 99 % -- --   05/05/24 1844 (!) 146/61 99.2 °F (37.3 °C) 84 18 98 % 5' 8" (1.727 m) 83.5 kg (184 lb)     The patient is resting comfortably and in no acute distress.   He states that his symptoms have improved after treatment in Emergency Department. I personally discussed his test results and treatment plan.  Gave strict ED precautions.  Specific conditions for return to the emergency department and importance of follow up with his primary care provided or the physician listed on the discharge instructions.  Patient voices understanding and agrees to the plan discussed. All patients' questions have been answered at this time.   He has remained hemodynamically stable throughout entire stay in ED and is stable for discharge home.                   Clinical Impression:  Final diagnoses:  [R53.1] Weakness  [E86.0] Dehydration (Primary)  [N17.9] GONZALO (acute kidney injury)          ED Disposition Condition    Discharge Stable          ED Prescriptions    None       Follow-up Information       Follow up With Specialties Details Why Contact Info    Ayala Villagomez MD Internal Medicine In 1 week Recheck Chem [address] [facility] [address]  [phone]               Shoaib Dey MD  05/06/24 0032    "

## 2024-05-15 ENCOUNTER — TELEPHONE (OUTPATIENT)
Dept: ADMINISTRATIVE | Facility: HOSPITAL | Age: 89
End: 2024-05-15
Payer: MEDICARE

## 2024-05-15 NOTE — TELEPHONE ENCOUNTER
Spoke to daughter, the patient can still care for himself. They are looking at options to keep him in his home or nursing home. They would rather him stay home but they are in contact with [facility] and will try [facility].

## 2024-05-22 ENCOUNTER — OFFICE VISIT (OUTPATIENT)
Dept: INTERNAL MEDICINE | Facility: CLINIC | Age: 89
End: 2024-05-22
Payer: MEDICARE

## 2024-05-22 VITALS
SYSTOLIC BLOOD PRESSURE: 104 MMHG | HEART RATE: 82 BPM | HEIGHT: 68 IN | WEIGHT: 178 LBS | BODY MASS INDEX: 26.98 KG/M2 | DIASTOLIC BLOOD PRESSURE: 60 MMHG | OXYGEN SATURATION: 99 %

## 2024-05-22 DIAGNOSIS — E78.5 HYPERLIPIDEMIA, UNSPECIFIED HYPERLIPIDEMIA TYPE: ICD-10-CM

## 2024-05-22 DIAGNOSIS — H91.90 HEARING LOSS, UNSPECIFIED HEARING LOSS TYPE, UNSPECIFIED LATERALITY: Primary | ICD-10-CM

## 2024-05-22 DIAGNOSIS — N18.31 CHRONIC KIDNEY DISEASE, STAGE 3A: ICD-10-CM

## 2024-05-22 DIAGNOSIS — I10 ESSENTIAL HYPERTENSION: ICD-10-CM

## 2024-05-22 PROCEDURE — 99214 OFFICE O/P EST MOD 30 MIN: CPT | Mod: ,,, | Performed by: STUDENT IN AN ORGANIZED HEALTH CARE EDUCATION/TRAINING PROGRAM

## 2024-05-22 RX ORDER — ALLOPURINOL 300 MG/1
300 TABLET ORAL DAILY
Qty: 90 TABLET | Refills: 3 | Status: SHIPPED | OUTPATIENT
Start: 2024-05-22 | End: 2024-05-24

## 2024-05-22 RX ORDER — LISINOPRIL 40 MG/1
40 TABLET ORAL DAILY
Qty: 90 TABLET | Refills: 3 | Status: SHIPPED | OUTPATIENT
Start: 2024-05-22

## 2024-05-22 RX ORDER — FINASTERIDE 5 MG/1
5 TABLET, FILM COATED ORAL DAILY
Qty: 90 TABLET | Refills: 3 | Status: SHIPPED | OUTPATIENT
Start: 2024-05-22

## 2024-05-22 RX ORDER — PRAVASTATIN SODIUM 20 MG/1
20 TABLET ORAL DAILY
Qty: 90 TABLET | Refills: 3 | Status: SHIPPED | OUTPATIENT
Start: 2024-05-22

## 2024-05-22 RX ORDER — AMLODIPINE BESYLATE 10 MG/1
10 TABLET ORAL DAILY
Qty: 90 TABLET | Refills: 3 | Status: SHIPPED | OUTPATIENT
Start: 2024-05-22

## 2024-05-22 RX ORDER — HYDRALAZINE HYDROCHLORIDE 50 MG/1
50 TABLET, FILM COATED ORAL 2 TIMES DAILY
Qty: 180 TABLET | Refills: 3 | Status: SHIPPED | OUTPATIENT
Start: 2024-05-22 | End: 2024-06-13 | Stop reason: SDUPTHER

## 2024-05-24 PROBLEM — H91.90 HEARING LOSS: Status: ACTIVE | Noted: 2024-05-24

## 2024-05-24 RX ORDER — ALLOPURINOL 300 MG/1
300 TABLET ORAL DAILY
Qty: 90 TABLET | Refills: 3 | Status: SHIPPED | OUTPATIENT
Start: 2024-05-24

## 2024-05-24 NOTE — PROGRESS NOTES
Subjective:      Yusef Dodd  05/24/2024  [id number]      Chief Complaint: Follow-up (Hospital f/u)       HPI:  Mr Holbrook presents for follow up. Patient was recently in ER due to dehydration, now feeling better. Patient is accompanied by his daughter who is concerned about patient needing more care, would like for home health to be set up for patient. States patient still able to ambulate with walker, able to feed himself, needs assistance with bathing.     Past Medical History:   Diagnosis Date    Essential (primary) hypertension     Gout     High cholesterol      Past Surgical History:   Procedure Laterality Date    HERNIA REPAIR      HIP SURGERY      fracture of femur     Family History   Family history unknown: Yes     Social History     Tobacco Use    Smoking status: Never    Smokeless tobacco: Never   Substance and Sexual Activity    Alcohol use: Not Currently    Drug use: Never    Sexual activity: Not Currently     Review of patient's allergies indicates:   Allergen Reactions    Penicillin Nausea And Vomiting       The following were reviewed at this visit: active problem list, medication list, allergies, family history, social history, and health maintenance.    Medications:    Current Outpatient Medications:     acetaminophen (TYLENOL) 500 MG tablet, Take 500 mg by mouth every 6 (six) hours as needed for Pain., Disp: , Rfl:     albuterol (PROVENTIL) 2.5 mg /3 mL (0.083 %) nebulizer solution, Take 3 mLs (2.5 mg total) by nebulization every 4 (four) hours. Rescue, Disp: 300 mL, Rfl: 6    aspirin 500 MG tablet, Take 500 mg by mouth every 6 (six) hours as needed for Pain., Disp: , Rfl:     levalbuterol (XOPENEX HFA) 45 mcg/actuation inhaler, Inhale into the lungs., Disp: , Rfl:     allopurinoL (ZYLOPRIM) 300 MG tablet, Take 1 tablet (300 mg total) by mouth once daily., Disp: 90 tablet, Rfl: 3    amLODIPine (NORVASC) 10 MG tablet, Take 1 tablet (10 mg total) by mouth once daily., Disp: 90 tablet, Rfl: 3     "finasteride (PROSCAR) 5 mg tablet, Take 1 tablet (5 mg total) by mouth once daily., Disp: 90 tablet, Rfl: 3    hydrALAZINE (APRESOLINE) 50 MG tablet, Take 1 tablet (50 mg total) by mouth 2 (two) times daily., Disp: 180 tablet, Rfl: 3    lisinopriL (PRINIVIL,ZESTRIL) 40 MG tablet, Take 1 tablet (40 mg total) by mouth once daily., Disp: 90 tablet, Rfl: 3    pravastatin (PRAVACHOL) 20 MG tablet, Take 1 tablet (20 mg total) by mouth once daily., Disp: 90 tablet, Rfl: 3      Medications have been reviewed and reconciled with patient at this visit.  Barriers to medications reviewed with patient.    Adverse reactions to current medications reviewed with patient..    Over the counter medications reviewed and reconciled with patient.  Review of Systems   Constitutional:  Negative for chills, diaphoresis, fever and weight loss.   HENT:  Negative for congestion, ear discharge, sinus pain and sore throat.    Eyes:  Negative for photophobia.   Respiratory:  Negative for cough, hemoptysis and shortness of breath.    Cardiovascular:  Negative for chest pain, palpitations, claudication, leg swelling and PND.   Gastrointestinal:  Negative for constipation, diarrhea, nausea and vomiting.   Genitourinary:  Negative for dysuria, frequency and urgency.   Musculoskeletal:  Negative for back pain, joint pain, myalgias and neck pain.   Skin:  Negative for itching and rash.   Neurological:  Negative for dizziness, focal weakness and headaches.   Psychiatric/Behavioral:  Negative for depression. The patient does not have insomnia.            Objective:      Vitals:    05/22/24 1420   BP: 104/60   Pulse: 82   SpO2: 99%   Weight: 80.7 kg (178 lb)   Height: 5' 8" (1.727 m)       Physical Exam  Constitutional:       Appearance: Normal appearance.   HENT:      Head: Normocephalic and atraumatic.   Cardiovascular:      Rate and Rhythm: Normal rate and regular rhythm.      Pulses: Normal pulses.   Pulmonary:      Effort: Pulmonary effort is normal. "      Breath sounds: Normal breath sounds.   Abdominal:      General: Abdomen is flat. Bowel sounds are normal. There is no distension.      Palpations: Abdomen is soft.      Tenderness: There is no abdominal tenderness.   Musculoskeletal:         General: No tenderness. Normal range of motion.      Right lower leg: No edema.      Left lower leg: No edema.   Lymphadenopathy:      Cervical: No cervical adenopathy.   Neurological:      General: No focal deficit present.      Mental Status: He is alert and oriented to person, place, and time.               Assessment and Plan:       1. Hearing loss, unspecified hearing loss type, unspecified laterality  -     Ambulatory referral/consult to Audiology; Future; Expected date: 05/29/2024    2. Hyperlipidemia, unspecified hyperlipidemia type  Assessment & Plan:  Continue pravastatin     Orders:  -     pravastatin (PRAVACHOL) 20 MG tablet; Take 1 tablet (20 mg total) by mouth once daily.  Dispense: 90 tablet; Refill: 3  -     hydrALAZINE (APRESOLINE) 50 MG tablet; Take 1 tablet (50 mg total) by mouth 2 (two) times daily.  Dispense: 180 tablet; Refill: 3  -     finasteride (PROSCAR) 5 mg tablet; Take 1 tablet (5 mg total) by mouth once daily.  Dispense: 90 tablet; Refill: 3    3. Essential hypertension  Assessment & Plan:  Controlled  Continue amlodipine, hydralazine and lisinopril     Orders:  -     lisinopriL (PRINIVIL,ZESTRIL) 40 MG tablet; Take 1 tablet (40 mg total) by mouth once daily.  Dispense: 90 tablet; Refill: 3  -     amLODIPine (NORVASC) 10 MG tablet; Take 1 tablet (10 mg total) by mouth once daily.  Dispense: 90 tablet; Refill: 3    4. Chronic kidney disease, stage 3a  Assessment & Plan:  GONZALO when in the ER  Advised patient to stay well hydrated  Will obtain repeat CMP      Other orders  -     Discontinue: allopurinoL (ZYLOPRIM) 300 MG tablet; Take 1 tablet (300 mg total) by mouth once daily.  Dispense: 90 tablet; Refill: 3  -     allopurinoL (ZYLOPRIM) 300 MG  tablet; Take 1 tablet (300 mg total) by mouth once daily.  Dispense: 90 tablet; Refill: 3            Follow up: Follow up in about 7 months (around 12/9/2024) for wellness, Labs check.

## 2024-05-29 ENCOUNTER — TELEPHONE (OUTPATIENT)
Dept: INTERNAL MEDICINE | Facility: CLINIC | Age: 89
End: 2024-05-29
Payer: MEDICARE

## 2024-05-29 DIAGNOSIS — R41.3 MEMORY LOSS: Primary | ICD-10-CM

## 2024-05-29 NOTE — TELEPHONE ENCOUNTER
----- Message from Justin Sullivan sent at 5/29/2024  2:50 PM CDT -----  .Type:  Needs Medical Advice    Who Called: [facility]   Symptoms (please be specific):    How long has patient had these symptoms:    Pharmacy name and phone #:    Would the patient rather a call back or a response via [facility]?   Best Call Back Number: [phone]  Additional Information: Requested to speak with the nurse re: this patient.

## 2024-05-29 NOTE — TELEPHONE ENCOUNTER
Pt son in law requesting neuro referral, placed, HH referral changed as well, confirmed received some so far, asked to call if they do not get the rest

## 2024-05-29 NOTE — TELEPHONE ENCOUNTER
----- Message from Justin Sullivan sent at 5/29/2024  1:13 PM CDT -----  .Type:  Needs Medical Advice    Who Called: Nabil Frederick, pt's son in law  Symptoms (please be specific):    How long has patient had these symptoms:    Pharmacy name and phone #:    Would the patient rather a call back or a response via [facility]?   Best Call Back Number: [phone] and or wife # Pau [phone]  Additional Information: he needs to speak with the nurse and doctor about his father in law, he told me nothing is being done for him as far as referrals and he thinks he is having dementia.

## 2024-06-06 ENCOUNTER — TELEPHONE (OUTPATIENT)
Dept: INTERNAL MEDICINE | Facility: CLINIC | Age: 89
End: 2024-06-06
Payer: MEDICARE

## 2024-06-06 NOTE — TELEPHONE ENCOUNTER
----- Message from Richard Gerardo sent at 6/6/2024  4:27 PM CDT -----  Type:  Needs Medical Advice    Who Called: nursing specialties - [facility]   Symptoms (please be specific):    How long has patient had these symptoms:    Pharmacy name and phone #:    Would the patient rather a call back or a response via [facility]?   Best Call Back Number: [phone]  Additional Information: pt BP has been low in morning 97/72 heart rate 72.  Concerned Pt is on multiple hypertensions , and may be decreasing bp. Now has pt take some medications at night and some in the morning, has helped a little . Last reading 122/51 heart 70. Please follow up , to discuss medications . Next step

## 2024-06-06 NOTE — TELEPHONE ENCOUNTER
ARE THERE ANY OUTSTANDING TASK IN PATIENT'S CHART? NO LAB    IS THERE ANY DOCUMENTATION OF TASK IN CHART? NO         PLEASE HAVE PATIENT BRING A FULL LIST OR ACTUAL MEDICATIONS TO THE OFFICE VISIT

## 2024-06-07 NOTE — TELEPHONE ENCOUNTER
Spoke to Lesley. She advised and coached the family to change the way he was taking his medications and to also monitor the BP. She is not aware of any low BP's before now but if it does drop again she will call for update

## 2024-06-10 DIAGNOSIS — M25.561 ACUTE PAIN OF RIGHT KNEE: Primary | ICD-10-CM

## 2024-06-10 DIAGNOSIS — R26.81 UNSTEADY GAIT: ICD-10-CM

## 2024-06-13 ENCOUNTER — OFFICE VISIT (OUTPATIENT)
Dept: INTERNAL MEDICINE | Facility: CLINIC | Age: 89
End: 2024-06-13
Payer: MEDICARE

## 2024-06-13 VITALS
OXYGEN SATURATION: 98 % | SYSTOLIC BLOOD PRESSURE: 100 MMHG | WEIGHT: 170 LBS | BODY MASS INDEX: 25.76 KG/M2 | HEART RATE: 94 BPM | DIASTOLIC BLOOD PRESSURE: 60 MMHG | HEIGHT: 68 IN

## 2024-06-13 DIAGNOSIS — E78.5 HYPERLIPIDEMIA, UNSPECIFIED HYPERLIPIDEMIA TYPE: ICD-10-CM

## 2024-06-13 DIAGNOSIS — I10 ESSENTIAL HYPERTENSION: Chronic | ICD-10-CM

## 2024-06-13 DIAGNOSIS — N18.31 CHRONIC KIDNEY DISEASE, STAGE 3A: Primary | ICD-10-CM

## 2024-06-13 PROCEDURE — 99214 OFFICE O/P EST MOD 30 MIN: CPT | Mod: ,,, | Performed by: STUDENT IN AN ORGANIZED HEALTH CARE EDUCATION/TRAINING PROGRAM

## 2024-06-13 RX ORDER — HYDRALAZINE HYDROCHLORIDE 25 MG/1
25 TABLET, FILM COATED ORAL 2 TIMES DAILY
Qty: 60 TABLET | Refills: 3 | Status: SHIPPED | OUTPATIENT
Start: 2024-06-13

## 2024-06-17 ENCOUNTER — EXTERNAL HOME HEALTH (OUTPATIENT)
Dept: HOME HEALTH SERVICES | Facility: HOSPITAL | Age: 89
End: 2024-06-17
Payer: MEDICARE

## 2024-06-21 ENCOUNTER — TELEPHONE (OUTPATIENT)
Dept: INTERNAL MEDICINE | Facility: CLINIC | Age: 89
End: 2024-06-21
Payer: MEDICARE

## 2024-06-21 NOTE — TELEPHONE ENCOUNTER
----- Message from Angelika Cortes sent at 6/21/2024  9:13 AM CDT -----  Regarding: advice  .Type:  Needs Medical Advice    Who Called: pt's son in law, ronni maldonado    Symptoms (please be specific):      How long has patient had these symptoms:      Pharmacy name and phone #:      Would the patient rather a call back or a response via [facility]?     Best Call Back Number: [phone]  Additional Information: Pt's son in law states that he has some questions for the nurse or doctor regarding the pt's health; I did ask him what were the questions regarding and he stated that he'd rather speak with the nurse; please advise. Thanks.

## 2024-06-21 NOTE — TELEPHONE ENCOUNTER
"Patient's son-n-law is POA over the patient, bw him and his wife (the daughter) they are needing something stating that the patient is "incompetent" until he sees the neurologist in September.   His family is causing a lot of trouble with them, letting the patient drive without a license and so forth. They are going to see a  today but is needing something to show proof that he has some memory issues.    Please advise  "

## 2024-06-24 ENCOUNTER — TELEPHONE (OUTPATIENT)
Dept: INTERNAL MEDICINE | Facility: CLINIC | Age: 89
End: 2024-06-24
Payer: MEDICARE

## 2024-06-24 NOTE — TELEPHONE ENCOUNTER
Unable to state he is incompetent without formal evaluation, they cn come in to do a mini mental exam or wait until Neurology appointment and they will do it then

## 2024-06-24 NOTE — TELEPHONE ENCOUNTER
Son-n-law advised but is refusing to make another appt, see other message he would like the last appt to be used if at all possible

## 2024-06-24 NOTE — TELEPHONE ENCOUNTER
----- Message from Hector Wiggins sent at 6/24/2024  9:33 AM CDT -----  .Who Called: Nabil Mack pt's son-in-law    Caller is requesting assistance/information from provider's office.    Symptoms (please be specific): n/a   How long has patient had these symptoms:  n/a  List of preferred pharmacies on file (remove unneeded): [unfilled]  If different, enter pharmacy into here including location and phone number: n/a      Preferred Method of Contact: Phone Call  Patient's Preferred Phone Number on File: There are no phone numbers on file.   Best Call Back Number, if different:[phone]  Additional Information: pt's son-in-law called asking for update on message from Friday

## 2024-06-25 NOTE — TELEPHONE ENCOUNTER
ANEESH Hidalgo this am and no answer, left detailed message that Dr. Velasquez will have to see him for another appt in order to have documentation and do a mini mental exam her in the office. She is not able to use the last appt

## 2024-06-27 ENCOUNTER — DOCUMENT SCAN (OUTPATIENT)
Dept: HOME HEALTH SERVICES | Facility: HOSPITAL | Age: 89
End: 2024-06-27
Payer: MEDICARE

## 2024-07-01 NOTE — PROGRESS NOTES
Subjective:      Yusef Dodd  07/01/2024  [id number]      Chief Complaint: Follow-up (6 month)       HPI:  Mr Holbrook presents for 6 months follow up. Since last visit home health has been set up, patient's daughter and son in law check in on him daily. No new concerns.     Past Medical History:   Diagnosis Date    Essential (primary) hypertension     Gout     High cholesterol      Past Surgical History:   Procedure Laterality Date    HERNIA REPAIR      HIP SURGERY      fracture of femur     Family History   Family history unknown: Yes     Social History     Tobacco Use    Smoking status: Never    Smokeless tobacco: Never   Substance and Sexual Activity    Alcohol use: Not Currently    Drug use: Never    Sexual activity: Not Currently     Review of patient's allergies indicates:   Allergen Reactions    Penicillin Nausea And Vomiting       The following were reviewed at this visit: active problem list, medication list, allergies, family history, social history, and health maintenance.    Medications:    Current Outpatient Medications:     acetaminophen (TYLENOL) 500 MG tablet, Take 500 mg by mouth every 6 (six) hours as needed for Pain., Disp: , Rfl:     allopurinoL (ZYLOPRIM) 300 MG tablet, Take 1 tablet (300 mg total) by mouth once daily., Disp: 90 tablet, Rfl: 3    amLODIPine (NORVASC) 10 MG tablet, Take 1 tablet (10 mg total) by mouth once daily., Disp: 90 tablet, Rfl: 3    finasteride (PROSCAR) 5 mg tablet, Take 1 tablet (5 mg total) by mouth once daily., Disp: 90 tablet, Rfl: 3    lisinopriL (PRINIVIL,ZESTRIL) 40 MG tablet, Take 1 tablet (40 mg total) by mouth once daily., Disp: 90 tablet, Rfl: 3    pravastatin (PRAVACHOL) 20 MG tablet, Take 1 tablet (20 mg total) by mouth once daily., Disp: 90 tablet, Rfl: 3    albuterol (PROVENTIL) 2.5 mg /3 mL (0.083 %) nebulizer solution, Take 3 mLs (2.5 mg total) by nebulization every 4 (four) hours. Rescue (Patient not taking: Reported on 6/13/2024), Disp: 300 mL, Rfl:  "6    aspirin 500 MG tablet, Take 500 mg by mouth every 6 (six) hours as needed for Pain. (Patient not taking: Reported on 6/13/2024), Disp: , Rfl:     hydrALAZINE (APRESOLINE) 25 MG tablet, Take 1 tablet (25 mg total) by mouth 2 (two) times daily., Disp: 60 tablet, Rfl: 3    levalbuterol (XOPENEX HFA) 45 mcg/actuation inhaler, Inhale into the lungs. (Patient not taking: Reported on 6/13/2024), Disp: , Rfl:       Medications have been reviewed and reconciled with patient at this visit.  Barriers to medications reviewed with patient.    Adverse reactions to current medications reviewed with patient..    Over the counter medications reviewed and reconciled with patient.  Review of Systems   Constitutional:  Negative for chills, diaphoresis, fever and weight loss.   HENT:  Negative for congestion, ear discharge, sinus pain and sore throat.    Eyes:  Negative for photophobia.   Respiratory:  Negative for cough, hemoptysis and shortness of breath.    Cardiovascular:  Negative for chest pain, palpitations, claudication, leg swelling and PND.   Gastrointestinal:  Negative for constipation, diarrhea, nausea and vomiting.   Genitourinary:  Negative for dysuria, frequency and urgency.   Musculoskeletal:  Negative for back pain, joint pain, myalgias and neck pain.   Skin:  Negative for itching and rash.   Neurological:  Negative for dizziness, focal weakness and headaches.   Psychiatric/Behavioral:  Negative for depression. The patient does not have insomnia.            Objective:      Vitals:    06/13/24 1258   BP: 100/60   Pulse: 94   SpO2: 98%   Weight: 77.1 kg (170 lb)   Height: 5' 8" (1.727 m)       Physical Exam  Constitutional:       Appearance: Normal appearance.   HENT:      Head: Normocephalic and atraumatic.   Cardiovascular:      Rate and Rhythm: Normal rate and regular rhythm.      Pulses: Normal pulses.   Pulmonary:      Effort: Pulmonary effort is normal.      Breath sounds: Normal breath sounds.   Abdominal:     "  General: Abdomen is flat. Bowel sounds are normal. There is no distension.      Palpations: Abdomen is soft.      Tenderness: There is no abdominal tenderness.   Musculoskeletal:         General: No tenderness. Normal range of motion.      Right lower leg: No edema.      Left lower leg: No edema.   Lymphadenopathy:      Cervical: No cervical adenopathy.   Neurological:      General: No focal deficit present.      Mental Status: He is alert and oriented to person, place, and time.               Assessment and Plan:       1. Chronic kidney disease, stage 3a  Assessment & Plan:  Repeat CMP pending        2. Hyperlipidemia, unspecified hyperlipidemia type  Assessment & Plan:  Continue pravastatin     Orders:  -     hydrALAZINE (APRESOLINE) 25 MG tablet; Take 1 tablet (25 mg total) by mouth 2 (two) times daily.  Dispense: 60 tablet; Refill: 3    3. Essential hypertension  Assessment & Plan:  Controlled  Continue lisinopril, amlodipine and hydralazine               Follow up: follow up as scheduled for wellness

## 2024-07-29 PROCEDURE — G0179 MD RECERTIFICATION HHA PT: HCPCS | Mod: ,,, | Performed by: STUDENT IN AN ORGANIZED HEALTH CARE EDUCATION/TRAINING PROGRAM

## 2024-07-31 ENCOUNTER — HOSPITAL ENCOUNTER (EMERGENCY)
Facility: HOSPITAL | Age: 89
Discharge: HOME | End: 2024-07-31
Attending: FAMILY MEDICINE
Payer: MEDICARE

## 2024-07-31 VITALS
WEIGHT: 165 LBS | HEART RATE: 72 BPM | SYSTOLIC BLOOD PRESSURE: 137 MMHG | BODY MASS INDEX: 29.23 KG/M2 | OXYGEN SATURATION: 97 % | DIASTOLIC BLOOD PRESSURE: 95 MMHG | HEIGHT: 63 IN | RESPIRATION RATE: 18 BRPM | TEMPERATURE: 98 F

## 2024-07-31 DIAGNOSIS — E86.0 DEHYDRATION: Primary | ICD-10-CM

## 2024-07-31 DIAGNOSIS — R53.1 WEAKNESS: ICD-10-CM

## 2024-07-31 LAB
ANION GAP SERPL CALC-SCNC: 6 MEQ/L
BACTERIA #/AREA URNS AUTO: NORMAL /HPF
BASOPHILS # BLD AUTO: 0.03 X10(3)/MCL
BASOPHILS NFR BLD AUTO: 0.5 %
BILIRUB UR QL STRIP.AUTO: NEGATIVE
BUN SERPL-MCNC: 33.3 MG/DL (ref 8.4–25.7)
CALCIUM SERPL-MCNC: 9.2 MG/DL (ref 8.8–10)
CHLORIDE SERPL-SCNC: 109 MMOL/L (ref 98–111)
CLARITY UR: CLEAR
CO2 SERPL-SCNC: 27 MMOL/L (ref 23–31)
COLOR UR AUTO: YELLOW
CREAT SERPL-MCNC: 1.39 MG/DL (ref 0.73–1.18)
CREAT/UREA NIT SERPL: 24
EOSINOPHIL # BLD AUTO: 0.22 X10(3)/MCL (ref 0–0.9)
EOSINOPHIL NFR BLD AUTO: 3.6 %
ERYTHROCYTE [DISTWIDTH] IN BLOOD BY AUTOMATED COUNT: 15.1 % (ref 11.5–17)
FLUAV AG UPPER RESP QL IA.RAPID: NOT DETECTED
FLUBV AG UPPER RESP QL IA.RAPID: NOT DETECTED
GFR SERPLBLD CREATININE-BSD FMLA CKD-EPI: 48 ML/MIN/1.73/M2
GLUCOSE SERPL-MCNC: 111 MG/DL (ref 75–121)
GLUCOSE UR QL STRIP: NEGATIVE
HCT VFR BLD AUTO: 39.6 % (ref 42–52)
HGB BLD-MCNC: 12.3 G/DL (ref 14–18)
HGB UR QL STRIP: NEGATIVE
IMM GRANULOCYTES # BLD AUTO: 0.01 X10(3)/MCL (ref 0–0.04)
IMM GRANULOCYTES NFR BLD AUTO: 0.2 %
KETONES UR QL STRIP: NEGATIVE
LEUKOCYTE ESTERASE UR QL STRIP: NEGATIVE
LYMPHOCYTES # BLD AUTO: 1.36 X10(3)/MCL (ref 0.6–4.6)
LYMPHOCYTES NFR BLD AUTO: 22.3 %
MAGNESIUM SERPL-MCNC: 2.3 MG/DL (ref 1.6–2.6)
MCH RBC QN AUTO: 29.7 PG (ref 27–31)
MCHC RBC AUTO-ENTMCNC: 31.1 G/DL (ref 33–36)
MCV RBC AUTO: 95.7 FL (ref 80–94)
MONOCYTES # BLD AUTO: 0.66 X10(3)/MCL (ref 0.1–1.3)
MONOCYTES NFR BLD AUTO: 10.8 %
NEUTROPHILS # BLD AUTO: 3.82 X10(3)/MCL (ref 2.1–9.2)
NEUTROPHILS NFR BLD AUTO: 62.6 %
NITRITE UR QL STRIP: NEGATIVE
PH UR STRIP: 5.5 [PH]
PLATELET # BLD AUTO: 202 X10(3)/MCL (ref 130–400)
PMV BLD AUTO: 11.3 FL (ref 7.4–10.4)
POTASSIUM SERPL-SCNC: 4.5 MMOL/L (ref 3.5–5.1)
PROT UR QL STRIP: NEGATIVE
RBC # BLD AUTO: 4.14 X10(6)/MCL (ref 4.7–6.1)
RBC #/AREA URNS AUTO: NORMAL /HPF
RSV A 5' UTR RNA NPH QL NAA+PROBE: NOT DETECTED
SARS-COV-2 RNA RESP QL NAA+PROBE: NOT DETECTED
SODIUM SERPL-SCNC: 142 MMOL/L (ref 136–145)
SP GR UR STRIP.AUTO: 1.02 (ref 1–1.03)
SQUAMOUS #/AREA URNS AUTO: NORMAL /HPF
TROPONIN I SERPL-MCNC: 0.02 NG/ML (ref 0–0.04)
UROBILINOGEN UR STRIP-ACNC: 1
WBC # BLD AUTO: 6.1 X10(3)/MCL (ref 4.5–11.5)
WBC #/AREA URNS AUTO: NORMAL /HPF

## 2024-07-31 PROCEDURE — 81001 URINALYSIS AUTO W/SCOPE: CPT | Performed by: FAMILY MEDICINE

## 2024-07-31 PROCEDURE — 93005 ELECTROCARDIOGRAM TRACING: CPT

## 2024-07-31 PROCEDURE — 81003 URINALYSIS AUTO W/O SCOPE: CPT | Performed by: FAMILY MEDICINE

## 2024-07-31 PROCEDURE — 0241U COVID/RSV/FLU A&B PCR: CPT | Performed by: FAMILY MEDICINE

## 2024-07-31 PROCEDURE — 99285 EMERGENCY DEPT VISIT HI MDM: CPT | Mod: 25

## 2024-07-31 PROCEDURE — 85025 COMPLETE CBC W/AUTO DIFF WBC: CPT | Performed by: FAMILY MEDICINE

## 2024-07-31 PROCEDURE — 80048 BASIC METABOLIC PNL TOTAL CA: CPT | Performed by: FAMILY MEDICINE

## 2024-07-31 PROCEDURE — 83735 ASSAY OF MAGNESIUM: CPT | Performed by: FAMILY MEDICINE

## 2024-07-31 PROCEDURE — 84484 ASSAY OF TROPONIN QUANT: CPT | Performed by: FAMILY MEDICINE

## 2024-07-31 PROCEDURE — 93010 ELECTROCARDIOGRAM REPORT: CPT | Mod: ,,, | Performed by: INTERNAL MEDICINE

## 2024-08-01 ENCOUNTER — TELEPHONE (OUTPATIENT)
Dept: INTERNAL MEDICINE | Facility: CLINIC | Age: 89
End: 2024-08-01
Payer: MEDICARE

## 2024-08-01 LAB
OHS QRS DURATION: 92 MS
OHS QTC CALCULATION: 464 MS

## 2024-08-02 ENCOUNTER — LAB REQUISITION (OUTPATIENT)
Dept: LAB | Facility: HOSPITAL | Age: 89
End: 2024-08-02
Attending: STUDENT IN AN ORGANIZED HEALTH CARE EDUCATION/TRAINING PROGRAM
Payer: MEDICARE

## 2024-08-02 DIAGNOSIS — N39.0 URINARY TRACT INFECTION, SITE NOT SPECIFIED: ICD-10-CM

## 2024-08-02 PROCEDURE — 87184 SC STD DISK METHOD PER PLATE: CPT | Performed by: STUDENT IN AN ORGANIZED HEALTH CARE EDUCATION/TRAINING PROGRAM

## 2024-08-06 ENCOUNTER — TELEPHONE (OUTPATIENT)
Dept: INTERNAL MEDICINE | Facility: CLINIC | Age: 89
End: 2024-08-06
Payer: MEDICARE

## 2024-08-06 DIAGNOSIS — N39.0 URINARY TRACT INFECTION WITHOUT HEMATURIA, SITE UNSPECIFIED: Primary | ICD-10-CM

## 2024-08-06 RX ORDER — NITROFURANTOIN 25; 75 MG/1; MG/1
100 CAPSULE ORAL 2 TIMES DAILY
Qty: 14 CAPSULE | Refills: 0 | Status: SHIPPED | OUTPATIENT
Start: 2024-08-06

## 2024-08-07 LAB
BACTERIA UR CULT: ABNORMAL
BACTERIA UR CULT: ABNORMAL

## 2024-08-21 ENCOUNTER — EXTERNAL HOME HEALTH (OUTPATIENT)
Dept: HOME HEALTH SERVICES | Facility: HOSPITAL | Age: 89
End: 2024-08-21
Payer: MEDICARE

## 2024-09-25 ENCOUNTER — TELEPHONE (OUTPATIENT)
Dept: INTERNAL MEDICINE | Facility: CLINIC | Age: 89
End: 2024-09-25
Payer: MEDICARE

## 2024-09-25 NOTE — TELEPHONE ENCOUNTER
----- Message from Aurelia Gomez sent at 9/25/2024  4:05 PM CDT -----  .Type:  Patient Returning Call    Who Called:Pau  Who Left Message for Patient:daughter  Does the patient know what this is regarding?:Call Back  Would the patient rather a call back or a response via [facility]?   Best Call Back Number:[phone]  Additional Information: Please call back about referral to neurologist. The referral Dr does not call back

## 2024-09-25 NOTE — TELEPHONE ENCOUNTER
Patient's daughter is needing to make an appt with KARSETN Odonnell. She stated she has tried several time to reach someone and she has not received a call back. Is there someone that can give her a call to set this appt up. Thank you in advance

## 2024-09-28 ENCOUNTER — HOSPITAL ENCOUNTER (INPATIENT)
Facility: HOSPITAL | Age: 89
LOS: 9 days | Discharge: NURSING, REHAB OR LONG-TERM CARE | DRG: 378 | End: 2024-10-08
Attending: EMERGENCY MEDICINE | Admitting: STUDENT IN AN ORGANIZED HEALTH CARE EDUCATION/TRAINING PROGRAM
Payer: MEDICARE

## 2024-09-28 ENCOUNTER — HOSPITAL ENCOUNTER (EMERGENCY)
Facility: HOSPITAL | Age: 89
Discharge: ANOTHER HOSPITAL | End: 2024-09-28
Attending: SPECIALIST
Payer: MEDICARE

## 2024-09-28 VITALS
HEIGHT: 66 IN | HEART RATE: 90 BPM | DIASTOLIC BLOOD PRESSURE: 86 MMHG | TEMPERATURE: 98 F | WEIGHT: 200 LBS | RESPIRATION RATE: 21 BRPM | BODY MASS INDEX: 32.14 KG/M2 | OXYGEN SATURATION: 95 % | SYSTOLIC BLOOD PRESSURE: 160 MMHG

## 2024-09-28 DIAGNOSIS — R41.82 ALTERED MENTAL STATUS, UNSPECIFIED ALTERED MENTAL STATUS TYPE: ICD-10-CM

## 2024-09-28 DIAGNOSIS — D64.9 ANEMIA, UNSPECIFIED TYPE: ICD-10-CM

## 2024-09-28 DIAGNOSIS — K62.5 RECTAL BLEEDING: Primary | ICD-10-CM

## 2024-09-28 DIAGNOSIS — G93.40 ENCEPHALOPATHY, UNSPECIFIED TYPE: ICD-10-CM

## 2024-09-28 DIAGNOSIS — K92.1 GASTROINTESTINAL HEMORRHAGE WITH MELENA: ICD-10-CM

## 2024-09-28 DIAGNOSIS — K92.2 GI BLEEDING: Primary | ICD-10-CM

## 2024-09-28 DIAGNOSIS — K92.2 GASTROINTESTINAL HEMORRHAGE, UNSPECIFIED GASTROINTESTINAL HEMORRHAGE TYPE: ICD-10-CM

## 2024-09-28 LAB
ALBUMIN SERPL-MCNC: 3.4 G/DL (ref 3.4–4.8)
ALBUMIN/GLOB SERPL: 1.2 RATIO (ref 1.1–2)
ALP SERPL-CCNC: 84 UNIT/L (ref 40–150)
ALT SERPL-CCNC: 13 UNIT/L (ref 0–55)
ANION GAP SERPL CALC-SCNC: 10 MEQ/L
AST SERPL-CCNC: 23 UNIT/L (ref 5–34)
BASOPHILS # BLD AUTO: 0.02 X10(3)/MCL
BASOPHILS NFR BLD AUTO: 0.4 %
BILIRUB SERPL-MCNC: 0.6 MG/DL
BUN SERPL-MCNC: 29.9 MG/DL (ref 8.4–25.7)
CALCIUM SERPL-MCNC: 9 MG/DL (ref 8.8–10)
CHLORIDE SERPL-SCNC: 109 MMOL/L (ref 98–111)
CO2 SERPL-SCNC: 24 MMOL/L (ref 23–31)
COLOR STL: ABNORMAL
CONSISTENCY STL: ABNORMAL
CREAT SERPL-MCNC: 1.48 MG/DL (ref 0.73–1.18)
CREAT/UREA NIT SERPL: 20
EOSINOPHIL # BLD AUTO: 0.2 X10(3)/MCL (ref 0–0.9)
EOSINOPHIL NFR BLD AUTO: 3.6 %
ERYTHROCYTE [DISTWIDTH] IN BLOOD BY AUTOMATED COUNT: 14.3 % (ref 11.5–17)
GFR SERPLBLD CREATININE-BSD FMLA CKD-EPI: 45 ML/MIN/1.73/M2
GLOBULIN SER-MCNC: 2.9 GM/DL (ref 2.4–3.5)
GLUCOSE SERPL-MCNC: 105 MG/DL (ref 75–121)
HCT VFR BLD AUTO: 34 % (ref 42–52)
HEMOCCULT SP1 STL QL: POSITIVE
HGB BLD-MCNC: 10.5 G/DL (ref 14–18)
IMM GRANULOCYTES # BLD AUTO: 0.01 X10(3)/MCL (ref 0–0.04)
IMM GRANULOCYTES NFR BLD AUTO: 0.2 %
INR PPP: 1.1
LYMPHOCYTES # BLD AUTO: 1.56 X10(3)/MCL (ref 0.6–4.6)
LYMPHOCYTES NFR BLD AUTO: 28.1 %
MCH RBC QN AUTO: 29.2 PG (ref 27–31)
MCHC RBC AUTO-ENTMCNC: 30.9 G/DL (ref 33–36)
MCV RBC AUTO: 94.4 FL (ref 80–94)
MONOCYTES # BLD AUTO: 0.61 X10(3)/MCL (ref 0.1–1.3)
MONOCYTES NFR BLD AUTO: 11 %
NEUTROPHILS # BLD AUTO: 3.15 X10(3)/MCL (ref 2.1–9.2)
NEUTROPHILS NFR BLD AUTO: 56.7 %
PLATELET # BLD AUTO: 214 X10(3)/MCL (ref 130–400)
PMV BLD AUTO: 11.6 FL (ref 7.4–10.4)
POTASSIUM SERPL-SCNC: 4.2 MMOL/L (ref 3.5–5.1)
PROT SERPL-MCNC: 6.3 GM/DL (ref 5.8–7.6)
PROTHROMBIN TIME: 10.7 SECONDS (ref 12.5–14.5)
RBC # BLD AUTO: 3.6 X10(6)/MCL (ref 4.7–6.1)
SODIUM SERPL-SCNC: 143 MMOL/L (ref 136–145)
WBC # BLD AUTO: 5.55 X10(3)/MCL (ref 4.5–11.5)

## 2024-09-28 PROCEDURE — 99285 EMERGENCY DEPT VISIT HI MDM: CPT | Mod: 25

## 2024-09-28 PROCEDURE — 25000003 PHARM REV CODE 250: Performed by: SPECIALIST

## 2024-09-28 PROCEDURE — 80053 COMPREHEN METABOLIC PANEL: CPT | Performed by: SPECIALIST

## 2024-09-28 PROCEDURE — 85025 COMPLETE CBC W/AUTO DIFF WBC: CPT | Performed by: SPECIALIST

## 2024-09-28 PROCEDURE — 82272 OCCULT BLD FECES 1-3 TESTS: CPT | Performed by: SPECIALIST

## 2024-09-28 PROCEDURE — 85610 PROTHROMBIN TIME: CPT | Performed by: SPECIALIST

## 2024-09-28 PROCEDURE — 96361 HYDRATE IV INFUSION ADD-ON: CPT

## 2024-09-28 PROCEDURE — 96374 THER/PROPH/DIAG INJ IV PUSH: CPT

## 2024-09-28 PROCEDURE — 93005 ELECTROCARDIOGRAM TRACING: CPT

## 2024-09-28 PROCEDURE — 93010 ELECTROCARDIOGRAM REPORT: CPT | Mod: ,,, | Performed by: INTERNAL MEDICINE

## 2024-09-28 PROCEDURE — 99285 EMERGENCY DEPT VISIT HI MDM: CPT | Mod: 25,27

## 2024-09-28 PROCEDURE — 63600175 PHARM REV CODE 636 W HCPCS: Performed by: SPECIALIST

## 2024-09-28 RX ORDER — ALPRAZOLAM 0.5 MG/1
0.5 TABLET ORAL
Status: COMPLETED | OUTPATIENT
Start: 2024-09-28 | End: 2024-09-28

## 2024-09-28 RX ORDER — PANTOPRAZOLE SODIUM 40 MG/10ML
80 INJECTION, POWDER, LYOPHILIZED, FOR SOLUTION INTRAVENOUS
Status: COMPLETED | OUTPATIENT
Start: 2024-09-28 | End: 2024-09-28

## 2024-09-28 RX ADMIN — PANTOPRAZOLE SODIUM 80 MG: 40 INJECTION, POWDER, LYOPHILIZED, FOR SOLUTION INTRAVENOUS at 08:09

## 2024-09-28 RX ADMIN — ALPRAZOLAM 0.5 MG: 0.5 TABLET ORAL at 09:09

## 2024-09-28 RX ADMIN — SODIUM CHLORIDE 500 ML: 9 INJECTION, SOLUTION INTRAVENOUS at 07:09

## 2024-09-28 NOTE — Clinical Note
Diagnosis: GI bleeding [[id number]]   Future Attending Provider: TITO MARTINEZ [[id number]]   Admit to which facility:: [facility] [[id number]]   Reason for IP Medical Treatment  (Clinical interventions that can only be accomplished in the IP setting? ) :: gi bleed

## 2024-09-28 NOTE — ED PROVIDER NOTES
Encounter Date: 9/28/2024       History     Chief Complaint   Patient presents with    Rectal Bleeding     Family member reports dark blood from rectum pt reports started a little bit yesterday denies taking blood thinners denies any pain     89-year-old male brought in by family with dark maroon-colored blood with bowel movement since yesterday; he denies any abdominal pain or rectal pain, no dizziness, no chest pain, no shortness of breath; patient is unsure about colonoscopy and he is otherwise healthy other than past medical history of hypertension, gout and hypercholesterolemia; patient's daughter and son state he has never had a colonoscopy; patient lives alone    The history is provided by the patient.     Review of patient's allergies indicates:   Allergen Reactions    Penicillin Nausea And Vomiting     Past Medical History:   Diagnosis Date    Essential (primary) hypertension     Gout     High cholesterol      Past Surgical History:   Procedure Laterality Date    HERNIA REPAIR      HIP SURGERY      fracture of femur     Family History   Family history unknown: Yes     Social History     Tobacco Use    Smoking status: Never    Smokeless tobacco: Never   Substance Use Topics    Alcohol use: Not Currently    Drug use: Never     Review of Systems   Constitutional: Negative.    HENT: Negative.     Respiratory: Negative.     Cardiovascular: Negative.    Gastrointestinal: Negative.    Genitourinary: Negative.    Musculoskeletal:  Positive for arthralgias.   Neurological: Negative.        Physical Exam     Initial Vitals [09/28/24 1825]   BP Pulse Resp Temp SpO2   (!) 165/66 96 18 98 °F (36.7 °C) 99 %      MAP       --         Physical Exam    Nursing note and vitals reviewed.  Constitutional: He appears well-developed and well-nourished.   HENT:   Head: Normocephalic and atraumatic.   Eyes: EOM are normal. Pupils are equal, round, and reactive to light.   Neck: Neck supple.   Normal range of  motion.  Cardiovascular:  Normal rate, regular rhythm and normal heart sounds.           Pulmonary/Chest: Breath sounds normal.   Abdominal: Abdomen is soft. He exhibits no distension. There is no abdominal tenderness.   Patient with maroon-colored stool smeared on outer buttocks, anus without obvious hemorrhoid and good rectal tone There is no rebound and no guarding.   Musculoskeletal:         General: Normal range of motion.      Cervical back: Normal range of motion and neck supple.     Neurological: He is alert and oriented to person, place, and time. GCS score is 15. GCS eye subscore is 4. GCS verbal subscore is 5. GCS motor subscore is 6.   Skin: Skin is warm and dry.         ED Course   Procedures  Labs Reviewed   OCCULT BLOOD, STOOL 1ST SPECIMEN - Abnormal       Result Value    Stool Color 1 Brown      Stool Consistancy 1 soft      Occult Blood Stool 1 Positive (*)    COMPREHENSIVE METABOLIC PANEL - Abnormal    Sodium 143      Potassium 4.2      Chloride 109      CO2 24      Glucose 105      Blood Urea Nitrogen 29.9 (*)     Creatinine 1.48 (*)     Calcium 9.0      Protein Total 6.3      Albumin 3.4      Globulin 2.9      Albumin/Globulin Ratio 1.2      Bilirubin Total 0.6      ALP 84      ALT 13      AST 23      eGFR 45      Anion Gap 10.0      BUN/Creatinine Ratio 20     PROTIME-INR - Abnormal    PT 10.7 (*)     INR 1.1     CBC WITH DIFFERENTIAL - Abnormal    WBC 5.55      RBC 3.60 (*)     Hgb 10.5 (*)     Hct 34.0 (*)     MCV 94.4 (*)     MCH 29.2      MCHC 30.9 (*)     RDW 14.3      Platelet 214      MPV 11.6 (*)     Neut % 56.7      Lymph % 28.1      Mono % 11.0      Eos % 3.6      Basophil % 0.4      Lymph # 1.56      Neut # 3.15      Mono # 0.61      Eos # 0.20      Baso # 0.02      IG# 0.01      IG% 0.2     CBC W/ AUTO DIFFERENTIAL    Narrative:     The following orders were created for panel order CBC auto differential.  Procedure                               Abnormality         Status                      ---------                               -----------         ------                     CBC with Differential[[id number]]       Abnormal            Final result                 Please view results for these tests on the individual orders.          Imaging Results    None          Medications   sodium chloride 0.9% bolus 500 mL 500 mL (500 mLs Intravenous New Bag 9/28/24 1944)   pantoprazole injection 80 mg (80 mg Intravenous Given 9/28/24 2014)     Medical Decision Making  89-year-old male brought in by family with dark maroon-colored blood with bowel movement since yesterday; he denies any abdominal pain or rectal pain, no chest pain, no dizziness, no shortness of breath; patient is unsure about colonoscopy and he is otherwise healthy other than past medical history of hypertension, gout and hypercholesterolemia; no history of colonoscopy per patient's daughter and son; patient lives alone    DIFFERENTIAL DIAGNOSIS- rectal bleeding, hemorrhoid, tumor, anemia, colitis    Amount and/or Complexity of Data Reviewed  External Data Reviewed: labs and notes.  Labs: ordered. Decision-making details documented in ED Course.  Discussion of management or test interpretation with external provider(s): I reviewed patient's case with Dr. Esparza at [facility] Emergency room; I reviewed HPI medical history, physical exam, lab findings, treatment; patient accepted for transfer for gastroenterology evaluation    Risk  Prescription drug management.  Decision regarding hospitalization.  Risk Details: 500 mL normal saline given; Protonix 80 mg IV given; hemodynamically stable for transfer               ED Course as of 09/28/24 2048   Sat Sep 28, 2024   1852 Occult Blood(!): Positive [DD]   1922 Hemoglobin(!): 10.5 [DD]   1923 Hematocrit(!): 34.0 [DD]      ED Course User Index  [DD] Shoaib Dey MD          Patient Vitals for the past 24 hrs:   BP Temp Temp src Pulse Resp SpO2 Height Weight   09/28/24  "2033 (!) 149/70 -- -- 87 20 (!) 93 % -- --   09/28/24 2022 -- -- -- 94 18 96 % -- --   09/28/24 1945 (!) 161/79 -- -- 75 -- 97 % -- --   09/28/24 1854 (!) 165/76 -- -- 84 -- 96 % -- --   09/28/24 1825 (!) 165/66 98 °F (36.7 °C) Tympanic 96 18 99 % 5' 6" (1.676 m) 90.7 kg (200 lb)                      Clinical Impression:  Final diagnoses:  [K62.5] Rectal bleeding (Primary)  [D64.9] Anemia, unspecified type          ED Disposition Condition    Transfer to Another Facility Stable                Shoaib Dey MD  09/28/24 2048    "

## 2024-09-29 PROBLEM — K92.2 GASTROINTESTINAL HEMORRHAGE: Status: ACTIVE | Noted: 2024-09-29

## 2024-09-29 LAB
HCT VFR BLD AUTO: 33.3 % (ref 42–52)
HGB BLD-MCNC: 10.6 G/DL (ref 14–18)
OHS QRS DURATION: 80 MS
OHS QTC CALCULATION: 428 MS

## 2024-09-29 PROCEDURE — 63600175 PHARM REV CODE 636 W HCPCS: Performed by: INTERNAL MEDICINE

## 2024-09-29 PROCEDURE — 85018 HEMOGLOBIN: CPT | Performed by: EMERGENCY MEDICINE

## 2024-09-29 PROCEDURE — 11000001 HC ACUTE MED/SURG PRIVATE ROOM

## 2024-09-29 PROCEDURE — 25000003 PHARM REV CODE 250: Performed by: INTERNAL MEDICINE

## 2024-09-29 PROCEDURE — 85014 HEMATOCRIT: CPT | Performed by: EMERGENCY MEDICINE

## 2024-09-29 PROCEDURE — 21400001 HC TELEMETRY ROOM

## 2024-09-29 PROCEDURE — 25000003 PHARM REV CODE 250

## 2024-09-29 PROCEDURE — 63600175 PHARM REV CODE 636 W HCPCS

## 2024-09-29 PROCEDURE — 63600175 PHARM REV CODE 636 W HCPCS: Performed by: EMERGENCY MEDICINE

## 2024-09-29 PROCEDURE — 99291 CRITICAL CARE FIRST HOUR: CPT | Mod: 95,,, | Performed by: INTERNAL MEDICINE

## 2024-09-29 PROCEDURE — 25000003 PHARM REV CODE 250: Performed by: EMERGENCY MEDICINE

## 2024-09-29 PROCEDURE — 96372 THER/PROPH/DIAG INJ SC/IM: CPT | Performed by: EMERGENCY MEDICINE

## 2024-09-29 PROCEDURE — 25500020 PHARM REV CODE 255: Performed by: EMERGENCY MEDICINE

## 2024-09-29 PROCEDURE — 36415 COLL VENOUS BLD VENIPUNCTURE: CPT | Performed by: EMERGENCY MEDICINE

## 2024-09-29 RX ORDER — SODIUM CHLORIDE 0.9 % (FLUSH) 0.9 %
10 SYRINGE (ML) INJECTION
Status: DISCONTINUED | OUTPATIENT
Start: 2024-09-29 | End: 2024-10-08 | Stop reason: HOSPADM

## 2024-09-29 RX ORDER — PANTOPRAZOLE SODIUM 40 MG/10ML
40 INJECTION, POWDER, LYOPHILIZED, FOR SOLUTION INTRAVENOUS DAILY
Status: DISCONTINUED | OUTPATIENT
Start: 2024-09-29 | End: 2024-09-29

## 2024-09-29 RX ORDER — SODIUM CHLORIDE 9 MG/ML
INJECTION, SOLUTION INTRAVENOUS CONTINUOUS
Status: DISCONTINUED | OUTPATIENT
Start: 2024-09-29 | End: 2024-10-05

## 2024-09-29 RX ORDER — PANTOPRAZOLE SODIUM 40 MG/10ML
40 INJECTION, POWDER, LYOPHILIZED, FOR SOLUTION INTRAVENOUS 2 TIMES DAILY
Status: DISCONTINUED | OUTPATIENT
Start: 2024-09-29 | End: 2024-09-29

## 2024-09-29 RX ORDER — HALOPERIDOL 5 MG/ML
5 INJECTION INTRAMUSCULAR
Status: COMPLETED | OUTPATIENT
Start: 2024-09-29 | End: 2024-09-29

## 2024-09-29 RX ORDER — HYDRALAZINE HYDROCHLORIDE 25 MG/1
25 TABLET, FILM COATED ORAL 2 TIMES DAILY
Status: DISCONTINUED | OUTPATIENT
Start: 2024-09-29 | End: 2024-09-29

## 2024-09-29 RX ORDER — AMLODIPINE BESYLATE 5 MG/1
10 TABLET ORAL DAILY
Status: DISCONTINUED | OUTPATIENT
Start: 2024-09-29 | End: 2024-09-29

## 2024-09-29 RX ORDER — ALBUTEROL SULFATE 0.83 MG/ML
2.5 SOLUTION RESPIRATORY (INHALATION) EVERY 4 HOURS
Status: DISCONTINUED | OUTPATIENT
Start: 2024-09-29 | End: 2024-09-30

## 2024-09-29 RX ORDER — SODIUM, POTASSIUM,MAG SULFATES 17.5-3.13G
1 SOLUTION, RECONSTITUTED, ORAL ORAL 2 TIMES DAILY
Status: COMPLETED | OUTPATIENT
Start: 2024-09-29 | End: 2024-09-29

## 2024-09-29 RX ORDER — HYDRALAZINE HYDROCHLORIDE 20 MG/ML
10 INJECTION INTRAMUSCULAR; INTRAVENOUS EVERY 4 HOURS PRN
Status: DISCONTINUED | OUTPATIENT
Start: 2024-09-29 | End: 2024-10-08 | Stop reason: HOSPADM

## 2024-09-29 RX ORDER — FINASTERIDE 5 MG/1
5 TABLET, FILM COATED ORAL DAILY
Status: DISCONTINUED | OUTPATIENT
Start: 2024-09-29 | End: 2024-10-08 | Stop reason: HOSPADM

## 2024-09-29 RX ORDER — TALC
6 POWDER (GRAM) TOPICAL NIGHTLY PRN
Status: DISCONTINUED | OUTPATIENT
Start: 2024-09-29 | End: 2024-10-02

## 2024-09-29 RX ORDER — HALOPERIDOL 5 MG/ML
5 INJECTION INTRAMUSCULAR EVERY 8 HOURS PRN
Status: DISCONTINUED | OUTPATIENT
Start: 2024-09-29 | End: 2024-09-30

## 2024-09-29 RX ORDER — ALLOPURINOL 300 MG/1
300 TABLET ORAL DAILY
Status: DISCONTINUED | OUTPATIENT
Start: 2024-09-29 | End: 2024-10-08 | Stop reason: HOSPADM

## 2024-09-29 RX ORDER — LISINOPRIL 10 MG/1
40 TABLET ORAL DAILY
Status: DISCONTINUED | OUTPATIENT
Start: 2024-09-29 | End: 2024-09-29

## 2024-09-29 RX ADMIN — Medication 6 MG: at 09:09

## 2024-09-29 RX ADMIN — SODIUM CHLORIDE: 9 INJECTION, SOLUTION INTRAVENOUS at 01:09

## 2024-09-29 RX ADMIN — SODIUM CHLORIDE 8 MG/HR: 900 INJECTION INTRAVENOUS at 01:09

## 2024-09-29 RX ADMIN — SODIUM SULFATE, POTASSIUM SULFATE, MAGNESIUM SULFATE 354 ML: 17.5; 3.13; 1.6 SOLUTION, CONCENTRATE ORAL at 06:09

## 2024-09-29 RX ADMIN — LISINOPRIL 40 MG: 10 TABLET ORAL at 09:09

## 2024-09-29 RX ADMIN — ALLOPURINOL 300 MG: 300 TABLET ORAL at 09:09

## 2024-09-29 RX ADMIN — HALOPERIDOL LACTATE 5 MG: 5 INJECTION, SOLUTION INTRAMUSCULAR at 09:09

## 2024-09-29 RX ADMIN — HALOPERIDOL LACTATE 5 MG: 5 INJECTION, SOLUTION INTRAMUSCULAR at 12:09

## 2024-09-29 RX ADMIN — AMLODIPINE BESYLATE 10 MG: 5 TABLET ORAL at 09:09

## 2024-09-29 RX ADMIN — HYDRALAZINE HYDROCHLORIDE 25 MG: 25 TABLET ORAL at 09:09

## 2024-09-29 RX ADMIN — HYDRALAZINE HYDROCHLORIDE 10 MG: 20 INJECTION INTRAMUSCULAR; INTRAVENOUS at 05:09

## 2024-09-29 RX ADMIN — IOHEXOL 100 ML: 350 INJECTION, SOLUTION INTRAVENOUS at 02:09

## 2024-09-29 RX ADMIN — SODIUM SULFATE, POTASSIUM SULFATE, MAGNESIUM SULFATE 354 ML: 17.5; 3.13; 1.6 SOLUTION, CONCENTRATE ORAL at 09:09

## 2024-09-29 RX ADMIN — PANTOPRAZOLE SODIUM 40 MG: 40 INJECTION, POWDER, LYOPHILIZED, FOR SOLUTION INTRAVENOUS at 09:09

## 2024-09-29 RX ADMIN — FINASTERIDE 5 MG: 5 TABLET, FILM COATED ORAL at 09:09

## 2024-09-29 NOTE — H&P
[facility] Hospital Medicine History & Physical Examination       Patient Name: Yusef Dodd  MRN: [id number]  Patient Class: IP- Inpatient   Admission Date: 9/28/2024   Admitting Physician: KAY Service   Length of Stay: 0  Attending Physician: Sienna Armstrong MD  Primary Care Provider: Ayala Villagomez MD  Face-to-Face encounter date: 09/29/2024  Code Status:Full  Chief Complaint: GI Bleeding (Transfer from [facility] for GI services )      Screening for Social Drivers for health:  Patient screened for food insecurity, housing instability, transportation needs, utility difficulties, and interpersonal safety (select all that apply as identified as concern)  []Housing or Food  []Transportation Needs  []Utility Difficulties  [x]Interpersonal safety  []None      Patient information was obtained from patient, patient's family, past medical records and ER records.  ED records were reviewed in detail and documented below    HISTORY OF PRESENT ILLNESS:   Yusef oDdd is a 89 y.o. male who  has a past medical history of Essential (primary) hypertension, Gout, and High cholesterol.. The patient presented to [facility] on 9/28/2024 with a primary complaint of GI bleed    Mr. Holbrook is a 89-year-old gentleman established with Dr. Velasquez on that presented as a transfer from [facility] for lower GI bleed.  Apparently he did have maroon-colored stooling earlier according to family members.  Patient was noted to be FOBT positive.  He did receive 80 mg of Protonix IV before the transfer.  Was admitted to the hospital and GI was consulted.  H&H is being monitored and has remained stable with no indication for transfusion just yet.    CT scan confirmed active GI bleed in the proximal descending colon.  His other medical comorbidities are significant for CKD stage IIIA, hyperlipidemia, hypertension.      PAST MEDICAL HISTORY:     Past Medical History:   Diagnosis Date     Essential (primary) hypertension     Gout     High cholesterol        PAST SURGICAL HISTORY:     Past Surgical History:   Procedure Laterality Date    HERNIA REPAIR      HIP SURGERY      fracture of femur       ALLERGIES:   Penicillin    FAMILY HISTORY:   Reviewed and negative    SOCIAL HISTORY:     Social History     Tobacco Use    Smoking status: Never    Smokeless tobacco: Never   Substance Use Topics    Alcohol use: Not Currently        HOME MEDICATIONS:     Prior to Admission medications    Medication Sig Start Date End Date Taking? Authorizing Provider   acetaminophen (TYLENOL) 500 MG tablet Take 500 mg by mouth every 6 (six) hours as needed for Pain.    Provider, Historical   albuterol (PROVENTIL) 2.5 mg /3 mL (0.083 %) nebulizer solution Take 3 mLs (2.5 mg total) by nebulization every 4 (four) hours. Rescue  Patient not taking: Reported on 6/13/2024 10/26/23   Ayala Villagomez MD   allopurinoL (ZYLOPRIM) 300 MG tablet Take 1 tablet (300 mg total) by mouth once daily. 5/24/24   Ayala Villagomez MD   amLODIPine (NORVASC) 10 MG tablet Take 1 tablet (10 mg total) by mouth once daily. 5/22/24   Ayala Villagomez MD   aspirin 500 MG tablet Take 500 mg by mouth every 6 (six) hours as needed for Pain.  Patient not taking: Reported on 6/13/2024    Provider, Historical   finasteride (PROSCAR) 5 mg tablet Take 1 tablet (5 mg total) by mouth once daily. 5/22/24   Ayala Villagomez MD   hydrALAZINE (APRESOLINE) 25 MG tablet Take 1 tablet (25 mg total) by mouth 2 (two) times daily. 6/13/24   Ayala Villagomez MD   levalbuterol (XOPENEX HFA) 45 mcg/actuation inhaler Inhale into the lungs.  Patient not taking: Reported on 6/13/2024 9/13/21   Provider, Historical   lisinopriL (PRINIVIL,ZESTRIL) 40 MG tablet Take 1 tablet (40 mg total) by mouth once daily. 5/22/24   Ayala Villagomez MD   nitrofurantoin, macrocrystal-monohydrate, (MACROBID) 100 MG capsule Take 1 capsule (100 mg total) by  mouth 2 (two) times daily. 8/6/24   Ayala Villagomez MD   pravastatin (PRAVACHOL) 20 MG tablet Take 1 tablet (20 mg total) by mouth once daily. 5/22/24   Ayala Villagomez MD       REVIEW OF SYSTEMS:   Except as documented, all other systems reviewed and negative     PHYSICAL EXAM:     VITAL SIGNS: 24 HRS MIN & MAX LAST   Temp  Min: 97.3 °F (36.3 °C)  Max: 98 °F (36.7 °C) 97.5 °F (36.4 °C)   BP  Min: 133/55  Max: 172/89 (!) 133/55   Pulse  Min: 65  Max: 111  82   Resp  Min: 16  Max: 25 20   SpO2  Min: 92 %  Max: 99 % 97 %     General appearance:  Demented at baseline, very hard of hearing   HENT: Atraumatic head. Moist mucous membranes of oral cavity.  Eyes: Normal extraocular movements.   Neck: Supple.   Lungs: Clear to auscultation bilaterally. No wheezing present.   Heart:  S1-S2, tachycardia   Abdomen: Soft, non-distended, non-tender. Bowel sounds are normal.   Extremities: No cyanosis, clubbing, or edema.  Skin: No Rash.   Neuro:  Generalized weakness, nonfocal, hard of hearing and confused at baseline    LABS AND IMAGING:     Recent Labs   Lab 09/28/24  1847 09/29/24  0704   WBC 5.55  --    RBC 3.60*  --    HGB 10.5* 10.6*   HCT 34.0* 33.3*   MCV 94.4*  --    MCH 29.2  --    MCHC 30.9*  --    RDW 14.3  --      --    MPV 11.6*  --        Recent Labs   Lab 09/28/24  1847      K 4.2      CO2 24   BUN 29.9*   CREATININE 1.48*   CALCIUM 9.0   ALBUMIN 3.4   ALKPHOS 84   ALT 13   AST 23   BILITOT 0.6       Microbiology Results (last 7 days)       ** No results found for the last 168 hours. **             CT Abdomen Pelvis W Wo Contrast  Narrative: EXAMINATION:  CT ABDOMEN PELVIS W WO CONTRAST    CLINICAL HISTORY:  GI bleed;    TECHNIQUE:  Low dose axial images, sagittal and coronal reformations were obtained from the lung bases to the pubic symphysis following the IV administration of  contrast.  Delayed imaging and pre contrasted imaging was performed as well. Automatic exposure  control is utilized to reduce patient radiation exposure.    COMPARISON:  None    FINDINGS:  There is a bilateral lower lobe atelectasis    The liver appears normal.  No liver mass or lesion is seen.  Portal and hepatic veins appear normal.    There are some gallstones in the gallbladder.  No pericholecystic fluid seen.  No inflammatory changes are seen.    The pancreas appears normal.  No pancreatic mass or lesion is seen.    The spleen appears normal.  No splenic mass or lesion is seen.    The adrenal glands appear normal.  No adrenal nodule is seen.    Multiple cysts are seen in both kidneys.  No hydronephrosis or hydroureter seen.  No nephro or ureterolithiasis is seen.    Urinary bladder appears normal.    No colitis is seen.  No diverticulitis is seen.  No colonic mass or lesion or inflammatory process is seen.  There is evidence of an active GI hemorrhage seen in the proximal descending colon.  This is best seen on images number 21 through 25 series 17.    No free air is seen.  No free fluid is seen.    The bones appear grossly unremarkable.  Impression: Active GI bleed seen in the proximal descending colon    Bibasilar atelectasis    Cholelithiasis but no evidence of cholecystitis    Multiple renal cysts bilaterally    There is a discrepancy with the preliminary report.  Active GI hemorrhage is noted in the proximal descending colon.    The findings were relayed to the patient's nurse Andrew MORALES at the time I reviewed this report at 08:14 09/29/2024    Electronically signed by: Uriel Moran  Date:    09/29/2024  Time:    08:15      ASSESSMENT & PLAN:     Acute lower GI bleed   -CT confirmation of active bleed in the descending colon   -GI has been consulted   -continue with Protonix 40 mg IV  -discontinue aspirin   -monitor H&H closely and transfuse for hemoglobin less than 8     2. Hypertension   -home medications have been reviewed and resumed  -continue with amlodipine, hydralazine and lisinopril    CC  time: 31 minutes   Cc diagnosis: Active GI bleed with impending hemodynamic compromise      VTE Prophylaxis:  Anticoagulation secondary to active GI bleed, SCDs     Patient condition:  Fair    __________________________________________________________________________  INPATIENT LIST OF MEDICATIONS     Scheduled Meds:   albuterol  2.5 mg Nebulization Q4H    allopurinoL  300 mg Oral Daily    amLODIPine  10 mg Oral Daily    finasteride  5 mg Oral Daily    hydrALAZINE  25 mg Oral BID    lisinopriL  40 mg Oral Daily     Continuous Infusions:  PRN Meds:.  Current Facility-Administered Medications:     hydrALAZINE, 10 mg, Intravenous, Q4H PRN    melatonin, 6 mg, Oral, Nightly PRN    sodium chloride 0.9%, 10 mL, Intravenous, PRN        If patient was admitted under observational status it is with my approval/permission.        All diagnosis and differential diagnosis have been reviewed; assessment and plan has been documented; I have personally reviewed the labs and test results that are presently available; I have reviewed the patients medication list; I have reviewed the consulting providers response and recommendations. I have reviewed or attempted to review medical records based upon their availability.    All of the patient and family questions have been addressed and answered. Patient's is agreeable to the above stated plan. I will continue to monitor closely and make adjustments to medical management as needed.    If patient was admitted under observational status it is with my approval/permission.      Sienna Armstrong MD   09/29/2024

## 2024-09-29 NOTE — NURSING
Nurses Note -- 4 Eyes      9/29/2024   7:29 AM      Skin assessed during: Q Shift Change      [] No Altered Skin Integrity Present    []Prevention Measures Documented      [x] Yes- Altered Skin Integrity Present or Discovered   [x] LDA Added if Not in Epic (Describe Wound)   [x] New Altered Skin Integrity was Present on Admit and Documented in LDA   [x] Wound Image Taken    Wound Care Consulted? Yes    Attending Nurse:  CARMEN Landrum    Second RN/Staff Member:  CARMEN Manzo

## 2024-09-29 NOTE — NURSING
Nurses Note -- 4 Eyes      9/29/2024   6:22 PM      Skin assessed during: Admit      [] No Altered Skin Integrity Present    []Prevention Measures Documented      [x] Yes- Altered Skin Integrity Present or Discovered   [] LDA Added if Not in Epic (Describe Wound)   [] New Altered Skin Integrity was Present on Admit and Documented in LDA   [] Wound Image Taken    Wound Care Consulted? No, sx incisions    Attending Nurse:  CARMEN Landrum    Second RN/Staff Member:   ELLIOTT Medrano

## 2024-09-29 NOTE — ED NOTES
Pt sitting in bed, continues to pick at blankets and monitoring equipment. Visible from nurses station

## 2024-09-29 NOTE — NURSING
CNA called and said she needed immediate help- pt almost passed out on shower seat. No fall. Initial BP low, HR + o2 stable. Pale, lethargic. Paged attending MD. Per MD, start NS @ 75, DC all BP meds, keep pt in bed.

## 2024-09-29 NOTE — ED PROVIDER NOTES
Encounter Date: 9/28/2024       History     Chief Complaint   Patient presents with    GI Bleeding     Transfer from [facility] for GI services      89-year-old male transfer from [facility] for lower GI bleed patient had an episode of maroon stool earlier today according to family his hemoglobin has dropped a couple of points in the he was FOBT positive at the previous facility and given 80 of Protonix.    Patient has severe dementia he is unable to provide much useful history but denies any abdominal pain.      Review of patient's allergies indicates:   Allergen Reactions    Penicillin Nausea And Vomiting     Past Medical History:   Diagnosis Date    Essential (primary) hypertension     Gout     High cholesterol      Past Surgical History:   Procedure Laterality Date    HERNIA REPAIR      HIP SURGERY      fracture of femur     Family History   Family history unknown: Yes     Social History     Tobacco Use    Smoking status: Never    Smokeless tobacco: Never   Substance Use Topics    Alcohol use: Not Currently    Drug use: Never     Review of Systems   Gastrointestinal:  Negative for abdominal pain.       Physical Exam     Initial Vitals [09/28/24 2236]   BP Pulse Resp Temp SpO2   (!) 140/75 74 16 97.3 °F (36.3 °C) 99 %      MAP       --         Physical Exam    Constitutional: He appears well-developed and well-nourished. No distress.   HENT:   Head: Normocephalic and atraumatic.   Eyes: Conjunctivae and EOM are normal. Pupils are equal, round, and reactive to light. Right eye exhibits no discharge. Left eye exhibits no discharge. No scleral icterus.   Neck: No stridor present. No tracheal deviation present.   Pulmonary/Chest: No stridor.   Abdominal: He exhibits no distension.   Musculoskeletal:         General: No edema. Normal range of motion.     Neurological: He is alert and oriented to person, place, and time. He has normal strength and normal reflexes. No cranial nerve deficit.   Skin:  Skin is warm and dry. No rash noted. No erythema. No pallor.   Psychiatric: He has a normal mood and affect. His behavior is normal. Judgment and thought content normal.   Later in the ED course the patient did started having some agitation and sundowning         ED Course   Procedures  Labs Reviewed   HEMOGLOBIN AND HEMATOCRIT, BLOOD          Imaging Results              CT Abdomen Pelvis W Wo Contrast (Preliminary result)  Result time 09/29/24 03:44:16   Procedure changed from CT Abdomen Pelvis With IV Contrast NO Oral Contrast     Preliminary result by Jens Coronel MD (09/29/24 03:44:16)                   Narrative:    START OF REPORT:  Technique: CT of the abdomen and pelvis was performed with axial images as well as sagittal and coronal reconstruction images without and with intravenous contrast.    Comparison: None available.    Clinical History: GI Bleeding (Transfer from [facility] for GI services ).    Dosage Information: Automated Exposure Control was utilized 2266.34 mGy.cm.    Findings:  Lines and Tubes: None.  Thorax:  Lungs: There is pronounced nonspecific dependent change at the lung bases. Moderate streaky linear opacity is present at the visualized lung bases, consistent with scarring and subsegmental atelectasis.  Pleura: No effusions or thickening.  Heart: The heart appears somewhat prominent. Pronounced coronary artery calcification is seen. There is a trace pericardial effusion. Correlate clinically as regards additional evaluation and follow up.  Abdomen:  Abdominal Wall: No abdominal wall pathology is seen.  Liver: The patient appears status post partial right hepatectomy.  Biliary System: No intrahepatic or extrahepatic biliary duct dilatation is seen.  Gallbladder: A few large gallstones are seen in the gallbladder which otherwise appears unremarkable.  Pancreas: Moderate pancreatic atrophy is seen.  Spleen: The spleen appears unremarkable.  Adrenals: The adrenal glands appear  unremarkable.  Kidneys: The kidneys appear atrophic with no stones, solid masses or hydronephrosis. Multiple cysts are identified in the left kidney the largest of which measures 8.1 cm is on Image 102, Series 5 in the upper pole of the left kidney. Multiple cysts are identified in the right kidney the largest of which measures 5 cm is on Image 39, Series 5 in the upper pole of the right kidney.  Aorta: There is extensive calcification of the abdominal aorta and its branches.  IVC: Unremarkable.  Bowel: No contrast blush, contrast pooling or contrast extravasation is seen in the bowel to suggest acute gastrointestinal bleed.  Esophagus: The visualized esophagus appears unremarkable.  Stomach: The stomach appears unremarkable.  Duodenum: Unremarkable appearing duodenum.  Small Bowel: The small bowel appears unremarkable.  Colon: Nondistended. There is moderate stool in the colon which could reflect an element of constipation.  Appendix: The appendix appears unremarkable and is seen on Image 66, Series 17 through Image 63, Series 17.  Peritoneum: No intraperitoneal free air or ascites is seen.    Pelvis:  Bladder: The bladder appears unremarkable.  Male:  Prostate gland: The prostate gland is moderately enlarged with its median lobe projecting into the bladder base. There are a few calcifications in the prostate gland.    Bony structures:  Dorsal Spine: There is moderate to pronounced multilevel spondylosis of the visualized dorsal spine.  Bony Pelvis: There is mild to moderate degenerative change of the hip. A right hip prosthesis is noted in place.      Impression:  1. There is a trace pericardial effusion. Correlate clinically as regards additional evaluation and follow up.  2. No contrast blush, contrast pooling or contrast extravasation is seen in the bowel to suggest acute gastrointestinal bleed.  3. No acute intraabdominal or pelvic solid organ or bowel pathology identified. Details and other findings as  discussed above.                                         Medications   sodium chloride 0.9% flush 10 mL (has no administration in time range)   melatonin tablet 6 mg (has no administration in time range)   haloperidol lactate injection 5 mg (5 mg Intramuscular Given 9/29/24 0037)   iohexoL (OMNIPAQUE 350) injection 100 mL (100 mLs Intravenous Given 9/29/24 0244)     Medical Decision Making             ED Course as of 09/29/24 0412   Sun Sep 29, 2024   0412 Spoke with Dr. Armstrong [NL]      ED Course User Index  [NL] Jalen Esparza MD                           Clinical Impression:  Final diagnoses:  [K92.2] Gastrointestinal hemorrhage, unspecified gastrointestinal hemorrhage type          ED Disposition Condition    Admit Stable                Jalen Esparza MD  09/29/24 0412

## 2024-09-29 NOTE — ED NOTES
Pt accepted to [facility] ER per Dr Esparza. Report to Trupti MORALES. ALTAGRACIAI notified of transfer.

## 2024-09-29 NOTE — CONSULTS
Consult Note    Reason for Consult:      We were consulted by Dr. Armstrong to evaluate this patient for GIB.     HPI:   89-year-old white male unknown to our group w/pmhx of HTN, gout, HLD.    Presented to [facility] ER With complaints of dark red/maroon blood per rectum.  Upon arrival, afebrile VSS.  LINSEY revealed maroon-colored stool without obvious hemorrhoids.  HGB 10.5, INR 1.1, BUN/CR 29.9/1.48, occult positive stool.  He was transferred for GI services to [facility].    GI consulted for GIB  Hgb remains stable 10.5--10.6  CT abdomen pelvis with:  Active GIB in the proximal descending colon, cholelithiasis without cholecystitis.    Patient hard of hearing, confused at baseline, and speaks Cajun Persian.  Wife and son at bedside.  Advise maroon-colored stools that started yesterday.  Patient denies any abdominal pain nausea or vomiting.  No previous history of GI bleeding.  His biggest complaint today is his right knee is hurting.  No blood thinners or NSAID use at home.  No previous EGD or colonoscopy or known family history of colon cancer esophageal cancer or liver disease.  He ambulates at home with a walker.  Spoke with nurse who advises smear of maroon-colored stools with cleaning him today, but no large bowel movements or overt large volume bleeding.    PCP:  Ayala Villagomez MD    Review of patient's allergies indicates:   Allergen Reactions    Penicillin Nausea And Vomiting        Current Facility-Administered Medications   Medication Dose Route Frequency Provider Last Rate Last Admin    albuterol nebulizer solution 2.5 mg  2.5 mg Nebulization Q4H Sienna Armstrong MD        allopurinoL tablet 300 mg  300 mg Oral Daily Sienna Armstrong MD   300 mg at 09/29/24 0952    amLODIPine tablet 10 mg  10 mg Oral Daily Sienna Armstrong MD   10 mg at 09/29/24 0952    finasteride tablet 5 mg  5 mg Oral Daily Sienna Armstrong MD   5 mg at 09/29/24 0952    hydrALAZINE injection 10 mg  10 mg  Intravenous Q4H PRN Sienna Armstrong MD   10 mg at 09/29/24 0542    hydrALAZINE tablet 25 mg  25 mg Oral BID Sienna Armstrong MD   25 mg at 09/29/24 0951    lisinopriL tablet 40 mg  40 mg Oral Daily Sienna Armstrong MD   40 mg at 09/29/24 0951    melatonin tablet 6 mg  6 mg Oral Nightly PRN Jalen Esparza MD        pantoprazole injection 40 mg  40 mg Intravenous Daily Sienna Armstrong MD   40 mg at 09/29/24 0952    sodium chloride 0.9% flush 10 mL  10 mL Intravenous PRN Jalen Esparza MD         Medications Prior to Admission   Medication Sig Dispense Refill Last Dose/Taking    acetaminophen (TYLENOL) 500 MG tablet Take 500 mg by mouth every 6 (six) hours as needed for Pain.       albuterol (PROVENTIL) 2.5 mg /3 mL (0.083 %) nebulizer solution Take 3 mLs (2.5 mg total) by nebulization every 4 (four) hours. Rescue (Patient not taking: Reported on 6/13/2024) 300 mL 6     allopurinoL (ZYLOPRIM) 300 MG tablet Take 1 tablet (300 mg total) by mouth once daily. 90 tablet 3     amLODIPine (NORVASC) 10 MG tablet Take 1 tablet (10 mg total) by mouth once daily. 90 tablet 3     aspirin 500 MG tablet Take 500 mg by mouth every 6 (six) hours as needed for Pain. (Patient not taking: Reported on 6/13/2024)       finasteride (PROSCAR) 5 mg tablet Take 1 tablet (5 mg total) by mouth once daily. 90 tablet 3     hydrALAZINE (APRESOLINE) 25 MG tablet Take 1 tablet (25 mg total) by mouth 2 (two) times daily. 60 tablet 3     levalbuterol (XOPENEX HFA) 45 mcg/actuation inhaler Inhale into the lungs. (Patient not taking: Reported on 6/13/2024)       lisinopriL (PRINIVIL,ZESTRIL) 40 MG tablet Take 1 tablet (40 mg total) by mouth once daily. 90 tablet 3     nitrofurantoin, macrocrystal-monohydrate, (MACROBID) 100 MG capsule Take 1 capsule (100 mg total) by mouth 2 (two) times daily. 14 capsule 0     pravastatin (PRAVACHOL) 20 MG tablet Take 1 tablet (20 mg total) by mouth once daily. 90 tablet 3        Past  Medical History:  Past Medical History:   Diagnosis Date    Essential (primary) hypertension     Gout     High cholesterol       Past Surgical History:  Past Surgical History:   Procedure Laterality Date    HERNIA REPAIR      HIP SURGERY      fracture of femur      Family History:  Family History   Family history unknown: Yes     Social History:  Social History     Tobacco Use    Smoking status: Never    Smokeless tobacco: Never   Substance Use Topics    Alcohol use: Not Currently       Review of Systems:     Review of Systems   Gastrointestinal:  Positive for anal bleeding and blood in stool.   Musculoskeletal:  Positive for arthralgias.   All other systems reviewed and are negative.      Objective:     VITAL SIGNS: 24 HR MIN & MAX LAST    Temp  Min: 97.3 °F (36.3 °C)  Max: 98 °F (36.7 °C)  97.5 °F (36.4 °C)        BP  Min: 133/55  Max: 172/89  (!) 133/55     Pulse  Min: 65  Max: 111  82     Resp  Min: 16  Max: 25  20    SpO2  Min: 92 %  Max: 99 %  97 %      No intake or output data in the 24 hours ending 09/29/24 1023    Physical Exam  Constitutional:       General: He is not in acute distress.     Appearance: He is obese. He is ill-appearing. He is not toxic-appearing.   HENT:      Head: Normocephalic and atraumatic.      Mouth/Throat:      Mouth: Mucous membranes are moist.      Pharynx: Oropharynx is clear.   Cardiovascular:      Rate and Rhythm: Normal rate and regular rhythm.      Pulses: Normal pulses.      Heart sounds: Normal heart sounds.   Pulmonary:      Effort: Pulmonary effort is normal. No respiratory distress.      Breath sounds: Normal breath sounds. No stridor. No wheezing or rales.   Abdominal:      General: Bowel sounds are normal. There is no distension.      Palpations: Abdomen is soft. There is no mass.      Tenderness: There is no abdominal tenderness. There is no guarding.   Musculoskeletal:      Right lower leg: No edema.      Left lower leg: No edema.   Skin:     General: Skin is warm and  dry.   Neurological:      Mental Status: He is alert. Mental status is at baseline. He is disoriented.           Recent Results (from the past 48 hours)   Occult Blood, Stool 1st Specimen    Collection Time: 09/28/24  6:40 PM   Result Value Ref Range    Stool Color 1 Brown     Stool Consistancy 1 soft     Occult Blood Stool 1 Positive (A) Negative   Comprehensive metabolic panel    Collection Time: 09/28/24  6:47 PM   Result Value Ref Range    Sodium 143 136 - 145 mmol/L    Potassium 4.2 3.5 - 5.1 mmol/L    Chloride 109 98 - 111 mmol/L    CO2 24 23 - 31 mmol/L    Glucose 105 75 - 121 mg/dL    Blood Urea Nitrogen 29.9 (H) 8.4 - 25.7 mg/dL    Creatinine 1.48 (H) 0.73 - 1.18 mg/dL    Calcium 9.0 8.8 - 10.0 mg/dL    Protein Total 6.3 5.8 - 7.6 gm/dL    Albumin 3.4 3.4 - 4.8 g/dL    Globulin 2.9 2.4 - 3.5 gm/dL    Albumin/Globulin Ratio 1.2 1.1 - 2.0 ratio    Bilirubin Total 0.6 <=1.5 mg/dL    ALP 84 40 - 150 unit/L    ALT 13 0 - 55 unit/L    AST 23 5 - 34 unit/L    eGFR 45 mL/min/1.73/m2    Anion Gap 10.0 mEq/L    BUN/Creatinine Ratio 20    Protime-INR    Collection Time: 09/28/24  6:47 PM   Result Value Ref Range    PT 10.7 (L) 12.5 - 14.5 seconds    INR 1.1 <=1.3   CBC with Differential    Collection Time: 09/28/24  6:47 PM   Result Value Ref Range    WBC 5.55 4.50 - 11.50 x10(3)/mcL    RBC 3.60 (L) 4.70 - 6.10 x10(6)/mcL    Hgb 10.5 (L) 14.0 - 18.0 g/dL    Hct 34.0 (L) 42.0 - 52.0 %    MCV 94.4 (H) 80.0 - 94.0 fL    MCH 29.2 27.0 - 31.0 pg    MCHC 30.9 (L) 33.0 - 36.0 g/dL    RDW 14.3 11.5 - 17.0 %    Platelet 214 130 - 400 x10(3)/mcL    MPV 11.6 (H) 7.4 - 10.4 fL    Neut % 56.7 %    Lymph % 28.1 %    Mono % 11.0 %    Eos % 3.6 %    Basophil % 0.4 %    Lymph # 1.56 0.6 - 4.6 x10(3)/mcL    Neut # 3.15 2.1 - 9.2 x10(3)/mcL    Mono # 0.61 0.1 - 1.3 x10(3)/mcL    Eos # 0.20 0 - 0.9 x10(3)/mcL    Baso # 0.02 <=0.2 x10(3)/mcL    IG# 0.01 0 - 0.04 x10(3)/mcL    IG% 0.2 %   Hemoglobin and Hematocrit    Collection Time:  09/29/24  7:04 AM   Result Value Ref Range    Hgb 10.6 (L) 14.0 - 18.0 g/dL    Hct 33.3 (L) 42.0 - 52.0 %       CT Abdomen Pelvis W Wo Contrast    Result Date: 9/29/2024  EXAMINATION: CT ABDOMEN PELVIS W WO CONTRAST CLINICAL HISTORY: GI bleed; TECHNIQUE: Low dose axial images, sagittal and coronal reformations were obtained from the lung bases to the pubic symphysis following the IV administration of  contrast.  Delayed imaging and pre contrasted imaging was performed as well. Automatic exposure control is utilized to reduce patient radiation exposure. COMPARISON: None FINDINGS: There is a bilateral lower lobe atelectasis The liver appears normal.  No liver mass or lesion is seen.  Portal and hepatic veins appear normal. There are some gallstones in the gallbladder.  No pericholecystic fluid seen.  No inflammatory changes are seen. The pancreas appears normal.  No pancreatic mass or lesion is seen. The spleen appears normal.  No splenic mass or lesion is seen. The adrenal glands appear normal.  No adrenal nodule is seen. Multiple cysts are seen in both kidneys.  No hydronephrosis or hydroureter seen.  No nephro or ureterolithiasis is seen. Urinary bladder appears normal. No colitis is seen.  No diverticulitis is seen.  No colonic mass or lesion or inflammatory process is seen.  There is evidence of an active GI hemorrhage seen in the proximal descending colon.  This is best seen on images number 21 through 25 series 17. No free air is seen.  No free fluid is seen. The bones appear grossly unremarkable.     Active GI bleed seen in the proximal descending colon Bibasilar atelectasis Cholelithiasis but no evidence of cholecystitis Multiple renal cysts bilaterally There is a discrepancy with the preliminary report.  Active GI hemorrhage is noted in the proximal descending colon. The findings were relayed to the patient's nurse Andrew MORALES at the time I reviewed this report at 08:14 09/29/2024 Electronically signed by: Uriel  Olamidekhoi Date:    09/29/2024 Time:    08:15      Imaging personally reviewed by myself and SP.    Assessment / Plan:     89-year-old white male unknown to our group w/pmhx of HTN, gout, HLD. Presented to [facility] ER With complaints of dark red/maroon blood per rectum.      GI consulted for GIB  GIB  Hgb remains stable 10.5--10.6  CT abd/pel w:  Active GIB in the proximal descending colon.  -Continue ppi  -Monitor H/H and transfuse as needed to Hgb 7  -Monitor stools for bleeding   -Clear liquid diet today. Prep x 2 tonight.  -NPO at [facility] for colonoscopy Monday    Thank you for allowing us to participate in this patient's care.   Georgia Roe NP acting as scribe for Dr. Eriberto Cortez MD

## 2024-09-29 NOTE — NURSING
Nurses Note -- 4 Eyes      9/29/2024   5:38 AM      Skin assessed during: Admit      [] No Altered Skin Integrity Present    []Prevention Measures Documented      [x] Yes- Altered Skin Integrity Present or Discovered   [x] LDA Added if Not in Epic (Describe Wound)   [x] New Altered Skin Integrity was Present on Admit and Documented in LDA   [x] Wound Image Taken    Wound Care Consulted? Yes    Attending Nurse:  Jovany MORALES    Second RN/Staff Member:   Paty Wiley RN

## 2024-09-29 NOTE — ED NOTES
Pt had BM on himself in bed; pt cleaned and changed into fresh gown. Stool noted to be dark red tinged. Pt resting comfortably.

## 2024-09-30 ENCOUNTER — ANESTHESIA (OUTPATIENT)
Dept: ENDOSCOPY | Facility: HOSPITAL | Age: 89
End: 2024-09-30
Payer: MEDICARE

## 2024-09-30 ENCOUNTER — TELEPHONE (OUTPATIENT)
Dept: INTERNAL MEDICINE | Facility: CLINIC | Age: 89
End: 2024-09-30
Payer: MEDICARE

## 2024-09-30 ENCOUNTER — ANESTHESIA EVENT (OUTPATIENT)
Dept: ENDOSCOPY | Facility: HOSPITAL | Age: 89
End: 2024-09-30
Payer: MEDICARE

## 2024-09-30 PROBLEM — R41.82 ALTERED MENTAL STATUS: Status: ACTIVE | Noted: 2024-09-30

## 2024-09-30 LAB
ANION GAP SERPL CALC-SCNC: 10 MEQ/L
BASOPHILS # BLD AUTO: 0.02 X10(3)/MCL
BASOPHILS NFR BLD AUTO: 0.3 %
BUN SERPL-MCNC: 21.1 MG/DL (ref 8.4–25.7)
CALCIUM SERPL-MCNC: 8.9 MG/DL (ref 8.8–10)
CHLORIDE SERPL-SCNC: 116 MMOL/L (ref 98–111)
CO2 SERPL-SCNC: 24 MMOL/L (ref 23–31)
CREAT SERPL-MCNC: 1.3 MG/DL (ref 0.73–1.18)
CREAT/UREA NIT SERPL: 16
EOSINOPHIL # BLD AUTO: 0.01 X10(3)/MCL (ref 0–0.9)
EOSINOPHIL NFR BLD AUTO: 0.1 %
ERYTHROCYTE [DISTWIDTH] IN BLOOD BY AUTOMATED COUNT: 14.5 % (ref 11.5–17)
GFR SERPLBLD CREATININE-BSD FMLA CKD-EPI: 52 ML/MIN/1.73/M2
GLUCOSE SERPL-MCNC: 111 MG/DL (ref 75–121)
HCT VFR BLD AUTO: 34.7 % (ref 42–52)
HGB BLD-MCNC: 10.7 G/DL (ref 14–18)
IMM GRANULOCYTES # BLD AUTO: 0.01 X10(3)/MCL (ref 0–0.04)
IMM GRANULOCYTES NFR BLD AUTO: 0.1 %
LYMPHOCYTES # BLD AUTO: 0.83 X10(3)/MCL (ref 0.6–4.6)
LYMPHOCYTES NFR BLD AUTO: 11.7 %
MCH RBC QN AUTO: 29.6 PG (ref 27–31)
MCHC RBC AUTO-ENTMCNC: 30.8 G/DL (ref 33–36)
MCV RBC AUTO: 95.9 FL (ref 80–94)
MONOCYTES # BLD AUTO: 0.68 X10(3)/MCL (ref 0.1–1.3)
MONOCYTES NFR BLD AUTO: 9.6 %
NEUTROPHILS # BLD AUTO: 5.54 X10(3)/MCL (ref 2.1–9.2)
NEUTROPHILS NFR BLD AUTO: 78.2 %
NRBC BLD AUTO-RTO: 0 %
PLATELET # BLD AUTO: 223 X10(3)/MCL (ref 130–400)
PMV BLD AUTO: 11.7 FL (ref 7.4–10.4)
POTASSIUM SERPL-SCNC: 3.9 MMOL/L (ref 3.5–5.1)
RBC # BLD AUTO: 3.62 X10(6)/MCL (ref 4.7–6.1)
SODIUM SERPL-SCNC: 150 MMOL/L (ref 136–145)
WBC # BLD AUTO: 7.09 X10(3)/MCL (ref 4.5–11.5)

## 2024-09-30 PROCEDURE — 63600175 PHARM REV CODE 636 W HCPCS: Performed by: INTERNAL MEDICINE

## 2024-09-30 PROCEDURE — 25000003 PHARM REV CODE 250: Performed by: NURSE ANESTHETIST, CERTIFIED REGISTERED

## 2024-09-30 PROCEDURE — 45378 DIAGNOSTIC COLONOSCOPY: CPT | Performed by: INTERNAL MEDICINE

## 2024-09-30 PROCEDURE — 0DJD8ZZ INSPECTION OF LOWER INTESTINAL TRACT, VIA NATURAL OR ARTIFICIAL OPENING ENDOSCOPIC: ICD-10-PCS | Performed by: INTERNAL MEDICINE

## 2024-09-30 PROCEDURE — 25000003 PHARM REV CODE 250: Performed by: STUDENT IN AN ORGANIZED HEALTH CARE EDUCATION/TRAINING PROGRAM

## 2024-09-30 PROCEDURE — 63600175 PHARM REV CODE 636 W HCPCS

## 2024-09-30 PROCEDURE — 11000001 HC ACUTE MED/SURG PRIVATE ROOM

## 2024-09-30 PROCEDURE — 25000003 PHARM REV CODE 250

## 2024-09-30 PROCEDURE — 25000003 PHARM REV CODE 250: Performed by: INTERNAL MEDICINE

## 2024-09-30 PROCEDURE — 80048 BASIC METABOLIC PNL TOTAL CA: CPT | Performed by: INTERNAL MEDICINE

## 2024-09-30 PROCEDURE — 85025 COMPLETE CBC W/AUTO DIFF WBC: CPT | Performed by: INTERNAL MEDICINE

## 2024-09-30 PROCEDURE — 37000008 HC ANESTHESIA 1ST 15 MINUTES: Performed by: INTERNAL MEDICINE

## 2024-09-30 PROCEDURE — 25000242 PHARM REV CODE 250 ALT 637 W/ HCPCS: Performed by: STUDENT IN AN ORGANIZED HEALTH CARE EDUCATION/TRAINING PROGRAM

## 2024-09-30 PROCEDURE — 94760 N-INVAS EAR/PLS OXIMETRY 1: CPT

## 2024-09-30 PROCEDURE — 94640 AIRWAY INHALATION TREATMENT: CPT

## 2024-09-30 PROCEDURE — 99900031 HC PATIENT EDUCATION (STAT)

## 2024-09-30 PROCEDURE — 99900035 HC TECH TIME PER 15 MIN (STAT)

## 2024-09-30 PROCEDURE — 63600175 PHARM REV CODE 636 W HCPCS: Performed by: NURSE ANESTHETIST, CERTIFIED REGISTERED

## 2024-09-30 PROCEDURE — 36415 COLL VENOUS BLD VENIPUNCTURE: CPT | Performed by: INTERNAL MEDICINE

## 2024-09-30 PROCEDURE — 37000009 HC ANESTHESIA EA ADD 15 MINS: Performed by: INTERNAL MEDICINE

## 2024-09-30 PROCEDURE — 21400001 HC TELEMETRY ROOM

## 2024-09-30 RX ORDER — HALOPERIDOL 5 MG/ML
5 INJECTION INTRAMUSCULAR EVERY 6 HOURS PRN
Status: DISCONTINUED | OUTPATIENT
Start: 2024-09-30 | End: 2024-10-08 | Stop reason: HOSPADM

## 2024-09-30 RX ORDER — LIDOCAINE HYDROCHLORIDE 20 MG/ML
INJECTION INTRAVENOUS
Status: DISCONTINUED | OUTPATIENT
Start: 2024-09-30 | End: 2024-09-30

## 2024-09-30 RX ORDER — PROPOFOL 10 MG/ML
VIAL (ML) INTRAVENOUS
Status: COMPLETED
Start: 2024-09-30 | End: 2024-09-30

## 2024-09-30 RX ORDER — METOPROLOL TARTRATE 1 MG/ML
5 INJECTION, SOLUTION INTRAVENOUS EVERY 4 HOURS PRN
Status: DISCONTINUED | OUTPATIENT
Start: 2024-09-30 | End: 2024-10-05

## 2024-09-30 RX ORDER — MICONAZOLE NITRATE 2 G/100G
POWDER TOPICAL 2 TIMES DAILY
Status: DISCONTINUED | OUTPATIENT
Start: 2024-09-30 | End: 2024-10-08 | Stop reason: HOSPADM

## 2024-09-30 RX ORDER — ALBUTEROL SULFATE 0.83 MG/ML
2.5 SOLUTION RESPIRATORY (INHALATION) EVERY 4 HOURS
Status: DISCONTINUED | OUTPATIENT
Start: 2024-09-30 | End: 2024-10-08 | Stop reason: HOSPADM

## 2024-09-30 RX ORDER — PROPOFOL 10 MG/ML
VIAL (ML) INTRAVENOUS
Status: DISPENSED
Start: 2024-09-30 | End: 2024-10-01

## 2024-09-30 RX ORDER — PROPOFOL 10 MG/ML
INJECTION, EMULSION INTRAVENOUS
Status: DISCONTINUED | OUTPATIENT
Start: 2024-09-30 | End: 2024-09-30

## 2024-09-30 RX ADMIN — ALBUTEROL SULFATE 2.5 MG: 2.5 SOLUTION RESPIRATORY (INHALATION) at 11:09

## 2024-09-30 RX ADMIN — ALBUTEROL SULFATE 2.5 MG: 2.5 SOLUTION RESPIRATORY (INHALATION) at 12:09

## 2024-09-30 RX ADMIN — ALBUTEROL SULFATE 2.5 MG: 2.5 SOLUTION RESPIRATORY (INHALATION) at 07:09

## 2024-09-30 RX ADMIN — METOPROLOL TARTRATE 5 MG: 1 INJECTION, SOLUTION INTRAVENOUS at 03:09

## 2024-09-30 RX ADMIN — PROPOFOL 100 MG: 10 INJECTION, EMULSION INTRAVENOUS at 02:09

## 2024-09-30 RX ADMIN — SODIUM CHLORIDE: 9 INJECTION, SOLUTION INTRAVENOUS at 06:09

## 2024-09-30 RX ADMIN — SODIUM CHLORIDE 8 MG/HR: 900 INJECTION INTRAVENOUS at 06:09

## 2024-09-30 RX ADMIN — SODIUM CHLORIDE 8 MG/HR: 900 INJECTION INTRAVENOUS at 08:09

## 2024-09-30 RX ADMIN — SODIUM CHLORIDE 8 MG/HR: 900 INJECTION INTRAVENOUS at 01:09

## 2024-09-30 RX ADMIN — HALOPERIDOL LACTATE 5 MG: 5 INJECTION, SOLUTION INTRAMUSCULAR at 03:09

## 2024-09-30 RX ADMIN — PROPOFOL 50 MG: 10 INJECTION, EMULSION INTRAVENOUS at 02:09

## 2024-09-30 RX ADMIN — Medication: at 08:09

## 2024-09-30 RX ADMIN — SODIUM CHLORIDE, SODIUM GLUCONATE, SODIUM ACETATE, POTASSIUM CHLORIDE AND MAGNESIUM CHLORIDE: 526; 502; 368; 37; 30 INJECTION, SOLUTION INTRAVENOUS at 02:09

## 2024-09-30 RX ADMIN — SODIUM CHLORIDE: 9 INJECTION, SOLUTION INTRAVENOUS at 12:09

## 2024-09-30 RX ADMIN — MICONAZOLE NITRATE: 20 POWDER TOPICAL at 08:09

## 2024-09-30 RX ADMIN — LIDOCAINE HYDROCHLORIDE 100 MG: 20 INJECTION INTRAVENOUS at 02:09

## 2024-09-30 RX ADMIN — ALBUTEROL SULFATE 2.5 MG: 2.5 SOLUTION RESPIRATORY (INHALATION) at 05:09

## 2024-09-30 NOTE — ANESTHESIA PREPROCEDURE EVALUATION
09/30/2024  Yusef Dodd is a 89 y.o., male.            10 / 34 / 223k  Na 150, K 3.9, Cr 1.30    Pre-op Assessment    I have reviewed the Patient Summary Reports.     I have reviewed the Nursing Notes. I have reviewed the NPO Status.   I have reviewed the Medications.     Review of Systems  Anesthesia Hx:               Denies Personal Hx of Anesthesia complications.                    Hematology/Oncology:       -- Anemia:                                  Cardiovascular:     Hypertension           hyperlipidemia                             Pulmonary:  Pulmonary Normal                       Renal/:  Chronic Renal Disease, CKD                Hepatic/GI:  Hepatic/GI Normal                     Physical Exam  General: Confusion    Airway:  Mallampati: II   Mouth Opening: Normal  TM Distance: Normal    Chest/Lungs:  Normal Respiratory Rate    Heart:  Rate: Bradycardia        Anesthesia Plan  Type of Anesthesia, risks & benefits discussed:    Anesthesia Type: Gen Natural Airway  Intra-op Monitoring Plan: Standard ASA Monitors  Post Op Pain Control Plan: IV/PO Opioids PRN  (medical reason for not using multimodal pain management)  Induction:  IV  Informed Consent: Informed consent signed with the Patient and all parties understand the risks and agree with anesthesia plan.  All questions answered.   ASA Score: 3  Day of Surgery Review of History & Physical: H&P Update referred to the surgeon/provider.    Ready For Surgery From Anesthesia Perspective.     .

## 2024-09-30 NOTE — ASSESSMENT & PLAN NOTE
Low GI bleed, presented with complaints of bleeding per rectum, FOBT positive on admission  GI consulted started on Protonix drip and taken for colonoscopy today with no acute findings  H/H have remained stable since admission  GI has signed off

## 2024-09-30 NOTE — ASSESSMENT & PLAN NOTE
Creatinine has been stable   Will continue IV fluids with normal saline since patient is not able to drink by mouth today

## 2024-09-30 NOTE — PROGRESS NOTES
Inpatient Nutrition Evaluation    Admit Date: 9/28/2024   Total duration of encounter: 2 days   Patient Age: 89 y.o.    Nutrition Recommendation/Prescription     Advance diet as tolerated to goal: regular diet  Trial Boost BID upon diet advancement (240 kcal, 10 g protein per serving)  Obtain new weight  Continue PPI as medically feasible  Monitor labs, intake and weight    Nutrition Assessment     Chart Review    Reason Seen: malnutrition screening tool (MST)    Malnutrition Screening Tool Results   Have you recently lost weight without trying?: Unsure  Have you been eating poorly because of a decreased appetite?: No (unable to assess, patient is confused)   MST Score: 2   Diagnosis:  Acute lower GI bleed   HTN    Relevant Medical History: HTN, gout, high cholesterol, CKD    Scheduled Medications:  albuterol, 2.5 mg, Q4H  allopurinoL, 300 mg, Daily  finasteride, 5 mg, Daily  miconazole NITRATE 2 %, , BID  zinc oxide-cod liver oil, , BID    Continuous Infusions:  0.9% NaCl, Last Rate: 75 mL/hr at 09/30/24 1206  pantoprazole (PROTONIX) IV infusion, Last Rate: 8 mg/hr (09/30/24 0801)    PRN Medications:   Current Facility-Administered Medications:     haloperidol lactate, 5 mg, Intramuscular, Q6H PRN    hydrALAZINE, 10 mg, Intravenous, Q4H PRN    melatonin, 6 mg, Oral, Nightly PRN    metoprolol, 5 mg, Intravenous, Q4H PRN    sodium chloride 0.9%, 10 mL, Intravenous, PRN    Recent Labs   Lab 09/28/24  1847 09/29/24  0704 09/30/24  0021 09/30/24  0552     --   --  150*   K 4.2  --   --  3.9   CALCIUM 9.0  --   --  8.9     --   --  116*   CO2 24  --   --  24   BUN 29.9*  --   --  21.1   CREATININE 1.48*  --   --  1.30*   EGFRNORACEVR 45  --   --  52   GLUCOSE 105  --   --  111   BILITOT 0.6  --   --   --    ALKPHOS 84  --   --   --    ALT 13  --   --   --    AST 23  --   --   --    ALBUMIN 3.4  --   --   --    WBC 5.55  --  7.09  --    HGB 10.5* 10.6* 10.7*  --    HCT 34.0* 33.3* 34.7*  --      Nutrition  "Orders:  Diet NPO Except for: Medication      Appetite/Oral Intake: NPO/NPO  Factors Affecting Nutritional Intake: NPO  Food/Scientology/Cultural Preferences: none reported  Food Allergies: no known food allergies  Last Bowel Movement: 24  Wound(s):     Wound 24 0532 Moisture associated dermatitis midline Perineum-Tissue loss description: Not applicable     Comments    24: Pt asleep with mittens on, awaiting colonoscopy. Family members report good intake PTA, no c/s issues reported. Intake yesterday was poor prior to NPO status. No n/v/d/c or abdominal pain per notes. Last BM  noted. Family agreeable to trial ONS upon diet advancement to ensure adequate nutrition. Estimated admit weight of 198# noted. Unable to obtain weight history at this time. Per EMR weight history, UBW ~176# with previous weight of 172# on 24; weight appears stable. Will attempt NFPE upon follow up as appropriate.    Anthropometrics    Height: 5' 9" (175.3 cm), Height Method: Estimated  Last Weight: 90 kg (198 lb 6.6 oz) (24), Weight Method: Estimated  BMI (Calculated): 29.3  BMI Classification: overweight (BMI 25-29.9)        Ideal Body Weight (IBW), Male: 160 lb     % Ideal Body Weight, Male (lb): 124.01 %                 Usual Body Weight (UBW), k kg  % Usual Body Weight: 112.74     Usual Weight Provided By: EMR weight history    Wt Readings from Last 5 Encounters:   24 90 kg (198 lb 6.6 oz)   24 90.7 kg (200 lb)   24 74.8 kg (165 lb)   24 77.1 kg (170 lb)   24 80.7 kg (178 lb)     Weight of 172# on  noted per EMR weight history; weight appears stable    Weight Change(s) Since Admission:   Wt Readings from Last 1 Encounters:   24 90 kg (198 lb 6.6 oz)   Admit Weight: 90 kg (198 lb 6.6 oz) (24), Weight Method: Estimated    Patient Education     Not applicable.    Nutrition Goals & Monitoring     Dietitian will monitor: food and beverage intake, " weight, electrolyte/renal panel, glucose/endocrine profile, and gastrointestinal profile    Nutrition Risk/Follow-Up: low (follow-up in 5-7 days)  Patients assigned 'low nutrition risk' status do not qualify for a full nutritional assessment but will be monitored and re-evaluated in a 5-7 day time period. Please consult if re-evaluation needed sooner.

## 2024-09-30 NOTE — SUBJECTIVE & OBJECTIVE
Interval History: Patient seen after he came back from GI lab, had colonoscopy today with no acute findings. Resting comfortably in bed, does not open eyes but moves all extremities spontaneously, per family he is asking to eat. Advised to not feed him for now until he is more awake     Review of Systems   Unable to perform ROS: Mental status change     Objective:     Vital Signs (Most Recent):  Temp: 98.6 °F (37 °C) (09/30/24 1655)  Pulse: 87 (09/30/24 1710)  Resp: 18 (09/30/24 1710)  BP: (!) 110/46 (09/30/24 1553)  SpO2: 100 % (09/30/24 1655) Vital Signs (24h Range):  Temp:  [97 °F (36.1 °C)-98.6 °F (37 °C)] 98.6 °F (37 °C)  Pulse:  [] 87  Resp:  [13-20] 18  SpO2:  [91 %-100 %] 100 %  BP: ()/(36-68) 110/46     Weight: 90 kg (198 lb 6.6 oz)  Body mass index is 29.3 kg/m².  No intake or output data in the 24 hours ending 09/30/24 1836      Physical Exam  Constitutional:       Appearance: Normal appearance.   HENT:      Head: Normocephalic and atraumatic.   Cardiovascular:      Rate and Rhythm: Normal rate and regular rhythm.      Pulses: Normal pulses.   Pulmonary:      Effort: Pulmonary effort is normal.      Breath sounds: Normal breath sounds.   Abdominal:      General: Abdomen is flat. Bowel sounds are normal. There is no distension.      Palpations: Abdomen is soft.      Tenderness: There is no abdominal tenderness.   Musculoskeletal:         General: Normal range of motion.      Right lower leg: No edema.      Left lower leg: No edema.   Neurological:      General: No focal deficit present.      Mental Status: He is easily aroused. He is lethargic.             Significant Labs: All pertinent labs within the past 24 hours have been reviewed.    Significant Imaging: I have reviewed all pertinent imaging results/findings within the past 24 hours.

## 2024-09-30 NOTE — PROGRESS NOTES
[facility] Medicine  Progress Note    Patient Name: Yusef Dodd  MRN: [id number]  Patient Class: IP- Inpatient   Admission Date: 9/28/2024  Length of Stay: 1 days  Attending Physician: Ayala Villagomez, *  Primary Care Provider: Ayala Villagomez MD        Subjective:     Principal Problem:<principal problem not specified>        HPI:  No notes on file    Overview/Hospital Course:  No notes on file    Interval History: Patient seen after he came back from GI lab, had colonoscopy today with no acute findings. Resting comfortably in bed, does not open eyes but moves all extremities spontaneously, per family he is asking to eat. Advised to not feed him for now until he is more awake     Review of Systems   Unable to perform ROS: Mental status change     Objective:     Vital Signs (Most Recent):  Temp: 98.6 °F (37 °C) (09/30/24 1655)  Pulse: 87 (09/30/24 1710)  Resp: 18 (09/30/24 1710)  BP: (!) 110/46 (09/30/24 1553)  SpO2: 100 % (09/30/24 1655) Vital Signs (24h Range):  Temp:  [97 °F (36.1 °C)-98.6 °F (37 °C)] 98.6 °F (37 °C)  Pulse:  [] 87  Resp:  [13-20] 18  SpO2:  [91 %-100 %] 100 %  BP: ()/(36-68) 110/46     Weight: 90 kg (198 lb 6.6 oz)  Body mass index is 29.3 kg/m².  No intake or output data in the 24 hours ending 09/30/24 1836      Physical Exam  Constitutional:       Appearance: Normal appearance.   HENT:      Head: Normocephalic and atraumatic.   Cardiovascular:      Rate and Rhythm: Normal rate and regular rhythm.      Pulses: Normal pulses.   Pulmonary:      Effort: Pulmonary effort is normal.      Breath sounds: Normal breath sounds.   Abdominal:      General: Abdomen is flat. Bowel sounds are normal. There is no distension.      Palpations: Abdomen is soft.      Tenderness: There is no abdominal tenderness.   Musculoskeletal:         General: Normal range of motion.      Right lower leg: No edema.      Left lower leg: No edema.    Neurological:      General: No focal deficit present.      Mental Status: He is easily aroused. He is lethargic.             Significant Labs: All pertinent labs within the past 24 hours have been reviewed.    Significant Imaging: I have reviewed all pertinent imaging results/findings within the past 24 hours.    Assessment/Plan:      Altered mental status  Family is concerned about decline in mental status since admission  Will obtain CT head         Gastrointestinal hemorrhage  Low GI bleed, presented with complaints of bleeding per rectum, FOBT positive on admission  GI consulted started on Protonix drip and taken for colonoscopy today with no acute findings  H/H have remained stable since admission  GI has signed off     Essential hypertension  Blood pressure has been controlled  Continue to monitor   Holding antihypertensives at the time     Stage 2 chronic kidney disease  Creatinine has been stable   Will continue IV fluids with normal saline since patient is not able to drink by mouth today      VTE Risk Mitigation (From admission, onward)           Ordered     IP VTE HIGH RISK PATIENT  Once         09/29/24 0412     Place sequential compression device  Until discontinued         09/29/24 0412                    Discharge Planning   BRENDA:      Code Status: Full Code   Is the patient medically ready for discharge?:     Reason for patient still in hospital (select all that apply): Treatment  Discharge Plan A: Other (TBD)                  Ayala Williamson MD  Department of Hospital Medicine   [facility] - [address] [facility]

## 2024-09-30 NOTE — NURSING
Nurses Note -- 4 Eyes      9/30/2024   3:56 AM      Skin assessed during: Q Shift Change      [] No Altered Skin Integrity Present    [x]Prevention Measures Documented      [x] Yes- Altered Skin Integrity Present or Discovered   [] LDA Added if Not in Epic (Describe Wound)   [] New Altered Skin Integrity was Present on Admit and Documented in LDA   [] Wound Image Taken    Wound Care Consulted? No    Attending Nurse:  Jovany Cam RN/Staff Member:  Lucita Morales RN

## 2024-09-30 NOTE — PROVATION PATIENT INSTRUCTIONS
Discharge Summary/Instructions after an Endoscopic Procedure  Patient Name: Yusef Dodd  Patient MRN: [id number]  Patient YOB: 1933  Monday, September 30, 2024  Jens Lizama MD  Dear patient,  As a result of recent federal legislation (The Federal Cures Act), you may   receive lab or pathology results from your procedure in your [email or web address]   account before your physician is able to contact you. Your physician or   their representative will relay the results to you with their   recommendations at their soonest availability.  Thank you,  RESTRICTIONS:  During your procedure today, you received medications for sedation.  These   medications may affect your judgment, balance and coordination.  Therefore,   for 24 hours, you have the following restrictions:   - DO NOT drive a car, operate machinery, make legal/financial decisions,   sign important papers or drink alcohol.    ACTIVITY:  Today: no heavy lifting, straining or running due to procedural   sedation/anesthesia.  The following day: return to full activity including work.  DIET:  Eat and drink normally unless instructed otherwise.     TREATMENT FOR COMMON SIDE EFFECTS:  - Mild abdominal pain, nausea, belching, bloating or excessive gas:  rest,   eat lightly and use a heating pad.  - Sore Throat: treat with throat lozenges and/or gargle with warm salt   water.  - Because air was used during the procedure, expelling large amounts of air   from your rectum or belching is normal.  - If a bowel prep was taken, you may not have a bowel movement for 1-3 days.    This is normal.  SYMPTOMS TO WATCH FOR AND REPORT TO YOUR PHYSICIAN:  1. Abdominal pain or bloating, other than gas cramps.  2. Chest pain.  3. Back pain.  4. Signs of infection such as: chills or fever occurring within 24 hours   after the procedure.  5. Rectal bleeding, which would show as bright red, maroon, or black stools.   (A tablespoon of blood from the rectum is not serious,  especially if   hemorrhoids are present.)  6. Vomiting.  7. Weakness or dizziness.  GO DIRECTLY TO THE NEAREST EMERGENCY ROOM IF YOU HAVE ANY OF THE FOLLOWING:      Difficulty breathing              Chills and/or fever over 101 F   Persistent vomiting and/or vomiting blood   Severe abdominal pain   Severe chest pain   Black, tarry stools   Bleeding- more than one tablespoon   Any other symptom or condition that you feel may need urgent attention  Your doctor recommends these additional instructions:  If any biopsies were taken, your doctors clinic will contact you in 1 to 2   weeks with any results.  - Return patient to [facility] for ongoing care.   - Resume previous diet.   - Monitor for any recurrent bleeding  - No further Gi recommendations.  Will sign off  - No repeat colonoscopy.  For questions, problems or results please call your physician - Jens Lizama MD at Work:  [phone].  [facility] ED at [phone]  IF A COMPLICATION OR EMERGENCY SITUATION ARISES AND YOU ARE UNABLE TO REACH   YOUR PHYSICIAN - GO DIRECTLY TO THE EMERGENCY ROOM.  Jens Lizama MD  9/30/2024 2:53:08 PM  This report has been verified and signed electronically.  Dear patient,  As a result of recent federal legislation (The Federal Cures Act), you may   receive lab or pathology results from your procedure in your [email or web address]   account before your physician is able to contact you. Your physician or   their representative will relay the results to you with their   recommendations at their soonest availability.  Thank you,  PROVATION

## 2024-09-30 NOTE — PLAN OF CARE
Patient was living alone in a single story home with 3 steps prior to entrance. Patient ambulated with rollator. DME in home: rollator, cane, shower chair. Patient's son check on patient twice daily for medications and monitor VS. Patient is current [facility]-updates sent via [employer]. Will continue to monitor for DC planning.        09/30/24 1109   Discharge Assessment   Assessment Type Discharge Planning Assessment   Confirmed/corrected address, phone number and insurance Yes   Confirmed Demographics Correct on Facesheet   Source of Information family   Reason For Admission Transfer from [facility] for GI services   People in Home alone   Do you expect to return to your current living situation? Other (see comments)  (TBD)   Do you have help at home or someone to help you manage your care at home? Yes   Who are your caregiver(s) and their phone number(s)? Filiberto (son) [phone]   Current cognitive status: Unable to Assess   Walking or Climbing Stairs Difficulty yes   Walking or Climbing Stairs ambulation difficulty, requires equipment   Dressing/Bathing Difficulty yes   Dressing/Bathing bathing difficulty, requires equipment   Home Accessibility stairs to enter home   Number of Stairs, Main Entrance three   Home Layout Able to live on 1st floor   Equipment Currently Used at Home rollator;cane, straight;shower chair   Readmission within 30 days? No   Patient currently being followed by outpatient case management? No   Do you currently have service(s) that help you manage your care at home? Yes   Name and Contact number of agency [facility]   Is the pt/caregiver preference to resume services with current agency Yes   Do you take prescription medications? Yes   Do you have prescription coverage? Yes   Coverage Medicare/[employer]   Do you have any problems affording any of your prescribed medications? No   Is the patient taking medications as prescribed? yes   How do you get to doctors appointments? family or friend will  provide   Are you on dialysis? No   Do you take coumadin? No   Discharge Plan A Other  (TBD)   Discharge Plan B Other  (TBD)   DME Needed Upon Discharge  other (see comments)  (TBD)   Discharge Plan discussed with: Adult children   Transition of Care Barriers None

## 2024-09-30 NOTE — NURSING
Subjective:      Patient ID: Yusef Dodd is a 89 y.o. male.    Chief Complaint: GI Bleeding (Transfer from [facility] for GI services )    HPI  Review of Systems   Objective:     Physical Exam   Assessment:     1. GI bleeding    2. Gastrointestinal hemorrhage, unspecified gastrointestinal hemorrhage type    3. Gastrointestinal hemorrhage with melena           Wound 09/29/24 0532 Moisture associated dermatitis midline Perineum (Active)   09/29/24 0532   Present on Original Admission: Y   Primary Wound Type: Moisture ass   Side:    Orientation: midline   Location: Perineum   Wound Approximate Age at First Assessment (Weeks):    Wound Number:    Is this injury device related?:    Incision Type:    Closure Method:    Wound Description (Comments):    Type:    Additional Comments:    Ankle-Brachial Index:    Pulses:    Removal Indication and Assessment:    Wound Outcome:    Wound Image   09/30/24 0915   Dressing Appearance Open to air 09/30/24 0915   Drainage Amount None 09/30/24 0915   Drainage Characteristics/Odor No odor 09/29/24 2000   Appearance Pink;Red;Dry 09/30/24 0915   Tissue loss description Not applicable 09/30/24 0915   Care Cleansed with:;Wound cleanser 09/30/24 0915   Dressing Absorptive Pad 09/30/24 0915       Plan:          WOCN consult-perineal coccyx redness.  89 y/o male in with Gib.   Lethargic with multiple family member in room.   Transfer from [facility].   Consulted on perineal redness see photo.  He is on low airloss support.  He is to be turned q2hrs with wedge and offloading boots.     Care put in place with orders to staffing.  Will follow up weekly while in hospital.

## 2024-09-30 NOTE — TRANSFER OF CARE
"Anesthesia Transfer of Care Note    Patient: Yusef Dodd    Procedure(s) Performed: Procedure(s) (LRB):  COLON (N/A)    Patient location: GI    Anesthesia Type: MAC    Transport from OR: Transported from OR on 100% O2 by closed face mask with adequate spontaneous ventilation    Post pain: adequate analgesia    Post assessment: no apparent anesthetic complications    Post vital signs: stable    Level of consciousness: sedated    Nausea/Vomiting: no nausea/vomiting    Complications: other (see comments)    Transfer of care protocol was followedComments: B/P low in PACU. 150mcg nikki IV. BP improved. See NN for VS.      Last vitals: Visit Vitals  /63 (BP Location: Left arm, Patient Position: Lying)   Pulse 99   Temp 36.7 °C (98.1 °F) (Temporal)   Resp 17   Ht 5' 9" (1.753 m)   Wt 90 kg (198 lb 6.6 oz)   SpO2 99%   BMI 29.30 kg/m²     "

## 2024-10-01 LAB
ALBUMIN SERPL-MCNC: 2.8 G/DL (ref 3.4–4.8)
ALBUMIN/GLOB SERPL: 1.2 RATIO (ref 1.1–2)
ALP SERPL-CCNC: 70 UNIT/L (ref 40–150)
ALT SERPL-CCNC: 19 UNIT/L (ref 0–55)
ANION GAP SERPL CALC-SCNC: 8 MEQ/L
AST SERPL-CCNC: 50 UNIT/L (ref 5–34)
BASOPHILS # BLD AUTO: 0.01 X10(3)/MCL
BASOPHILS NFR BLD AUTO: 0.1 %
BILIRUB SERPL-MCNC: 0.5 MG/DL
BUN SERPL-MCNC: 19.9 MG/DL (ref 8.4–25.7)
CALCIUM SERPL-MCNC: 8.3 MG/DL (ref 8.8–10)
CHLORIDE SERPL-SCNC: 119 MMOL/L (ref 98–111)
CO2 SERPL-SCNC: 22 MMOL/L (ref 23–31)
CREAT SERPL-MCNC: 1.24 MG/DL (ref 0.73–1.18)
CREAT/UREA NIT SERPL: 16
EOSINOPHIL # BLD AUTO: 0.04 X10(3)/MCL (ref 0–0.9)
EOSINOPHIL NFR BLD AUTO: 0.6 %
ERYTHROCYTE [DISTWIDTH] IN BLOOD BY AUTOMATED COUNT: 14.8 % (ref 11.5–17)
GFR SERPLBLD CREATININE-BSD FMLA CKD-EPI: 55 ML/MIN/1.73/M2
GLOBULIN SER-MCNC: 2.3 GM/DL (ref 2.4–3.5)
GLUCOSE SERPL-MCNC: 121 MG/DL (ref 75–121)
HCT VFR BLD AUTO: 29.4 % (ref 42–52)
HGB BLD-MCNC: 9.3 G/DL (ref 14–18)
IMM GRANULOCYTES # BLD AUTO: 0.03 X10(3)/MCL (ref 0–0.04)
IMM GRANULOCYTES NFR BLD AUTO: 0.4 %
LYMPHOCYTES # BLD AUTO: 0.59 X10(3)/MCL (ref 0.6–4.6)
LYMPHOCYTES NFR BLD AUTO: 8.4 %
MCH RBC QN AUTO: 30.1 PG (ref 27–31)
MCHC RBC AUTO-ENTMCNC: 31.6 G/DL (ref 33–36)
MCV RBC AUTO: 95.1 FL (ref 80–94)
MONOCYTES # BLD AUTO: 0.65 X10(3)/MCL (ref 0.1–1.3)
MONOCYTES NFR BLD AUTO: 9.2 %
NEUTROPHILS # BLD AUTO: 5.71 X10(3)/MCL (ref 2.1–9.2)
NEUTROPHILS NFR BLD AUTO: 81.3 %
NRBC BLD AUTO-RTO: 0 %
PLATELET # BLD AUTO: 184 X10(3)/MCL (ref 130–400)
PMV BLD AUTO: 11.8 FL (ref 7.4–10.4)
POTASSIUM SERPL-SCNC: 3.2 MMOL/L (ref 3.5–5.1)
PROT SERPL-MCNC: 5.1 GM/DL (ref 5.8–7.6)
RBC # BLD AUTO: 3.09 X10(6)/MCL (ref 4.7–6.1)
SODIUM SERPL-SCNC: 149 MMOL/L (ref 136–145)
WBC # BLD AUTO: 7.03 X10(3)/MCL (ref 4.5–11.5)

## 2024-10-01 PROCEDURE — 99900031 HC PATIENT EDUCATION (STAT)

## 2024-10-01 PROCEDURE — 25000242 PHARM REV CODE 250 ALT 637 W/ HCPCS: Performed by: STUDENT IN AN ORGANIZED HEALTH CARE EDUCATION/TRAINING PROGRAM

## 2024-10-01 PROCEDURE — 63600175 PHARM REV CODE 636 W HCPCS: Performed by: INTERNAL MEDICINE

## 2024-10-01 PROCEDURE — 25000003 PHARM REV CODE 250: Performed by: INTERNAL MEDICINE

## 2024-10-01 PROCEDURE — 36415 COLL VENOUS BLD VENIPUNCTURE: CPT | Performed by: STUDENT IN AN ORGANIZED HEALTH CARE EDUCATION/TRAINING PROGRAM

## 2024-10-01 PROCEDURE — 11000001 HC ACUTE MED/SURG PRIVATE ROOM

## 2024-10-01 PROCEDURE — 21400001 HC TELEMETRY ROOM

## 2024-10-01 PROCEDURE — 85025 COMPLETE CBC W/AUTO DIFF WBC: CPT | Performed by: STUDENT IN AN ORGANIZED HEALTH CARE EDUCATION/TRAINING PROGRAM

## 2024-10-01 PROCEDURE — 94640 AIRWAY INHALATION TREATMENT: CPT

## 2024-10-01 PROCEDURE — 97162 PT EVAL MOD COMPLEX 30 MIN: CPT

## 2024-10-01 PROCEDURE — 25000003 PHARM REV CODE 250: Performed by: EMERGENCY MEDICINE

## 2024-10-01 PROCEDURE — 99900035 HC TECH TIME PER 15 MIN (STAT)

## 2024-10-01 PROCEDURE — 80053 COMPREHEN METABOLIC PANEL: CPT | Performed by: STUDENT IN AN ORGANIZED HEALTH CARE EDUCATION/TRAINING PROGRAM

## 2024-10-01 PROCEDURE — 99233 SBSQ HOSP IP/OBS HIGH 50: CPT | Mod: ,,, | Performed by: STUDENT IN AN ORGANIZED HEALTH CARE EDUCATION/TRAINING PROGRAM

## 2024-10-01 PROCEDURE — 94760 N-INVAS EAR/PLS OXIMETRY 1: CPT

## 2024-10-01 PROCEDURE — 25000003 PHARM REV CODE 250

## 2024-10-01 PROCEDURE — 63600175 PHARM REV CODE 636 W HCPCS

## 2024-10-01 PROCEDURE — 25000003 PHARM REV CODE 250: Performed by: STUDENT IN AN ORGANIZED HEALTH CARE EDUCATION/TRAINING PROGRAM

## 2024-10-01 RX ORDER — QUETIAPINE FUMARATE 25 MG/1
25 TABLET, FILM COATED ORAL NIGHTLY
Status: DISCONTINUED | OUTPATIENT
Start: 2024-10-01 | End: 2024-10-03

## 2024-10-01 RX ADMIN — Medication: at 10:10

## 2024-10-01 RX ADMIN — Medication 6 MG: at 01:10

## 2024-10-01 RX ADMIN — ALLOPURINOL 300 MG: 300 TABLET ORAL at 08:10

## 2024-10-01 RX ADMIN — Medication: at 08:10

## 2024-10-01 RX ADMIN — ALBUTEROL SULFATE 2.5 MG: 2.5 SOLUTION RESPIRATORY (INHALATION) at 01:10

## 2024-10-01 RX ADMIN — ALBUTEROL SULFATE 2.5 MG: 2.5 SOLUTION RESPIRATORY (INHALATION) at 04:10

## 2024-10-01 RX ADMIN — SODIUM CHLORIDE: 9 INJECTION, SOLUTION INTRAVENOUS at 03:10

## 2024-10-01 RX ADMIN — SODIUM CHLORIDE 8 MG/HR: 900 INJECTION INTRAVENOUS at 01:10

## 2024-10-01 RX ADMIN — MICONAZOLE NITRATE: 20 POWDER TOPICAL at 08:10

## 2024-10-01 RX ADMIN — MICONAZOLE NITRATE: 20 POWDER TOPICAL at 10:10

## 2024-10-01 RX ADMIN — FINASTERIDE 5 MG: 5 TABLET, FILM COATED ORAL at 08:10

## 2024-10-01 RX ADMIN — ALBUTEROL SULFATE 2.5 MG: 2.5 SOLUTION RESPIRATORY (INHALATION) at 08:10

## 2024-10-01 RX ADMIN — ALBUTEROL SULFATE 2.5 MG: 2.5 SOLUTION RESPIRATORY (INHALATION) at 09:10

## 2024-10-01 RX ADMIN — SODIUM CHLORIDE 8 MG/HR: 900 INJECTION INTRAVENOUS at 08:10

## 2024-10-01 RX ADMIN — Medication 6 MG: at 09:10

## 2024-10-01 RX ADMIN — QUETIAPINE FUMARATE 25 MG: 25 TABLET ORAL at 09:10

## 2024-10-01 RX ADMIN — SODIUM CHLORIDE 8 MG/HR: 900 INJECTION INTRAVENOUS at 10:10

## 2024-10-01 RX ADMIN — HYDRALAZINE HYDROCHLORIDE 10 MG: 20 INJECTION INTRAMUSCULAR; INTRAVENOUS at 11:10

## 2024-10-01 RX ADMIN — SODIUM CHLORIDE 8 MG/HR: 900 INJECTION INTRAVENOUS at 03:10

## 2024-10-01 NOTE — PROGRESS NOTES
[facility] - [address] Hospital Medicine  Progress Note    Patient Name: Yusef Dodd  MRN: [id number]  Patient Class: IP- Inpatient   Admission Date: 9/28/2024  Length of Stay: 2 days  Attending Physician: Ayala Villagomez, *  Primary Care Provider: Ayala Villagomez MD        Subjective:     Principal Problem:<principal problem not specified>        HPI:  No notes on file    Overview/Hospital Course:  No notes on file    Interval History: patient is more awake today but drifts off on and off, per nurse he was restless last night and did not sleep. Will try Seroquel tonight for agitation. No additional episodes of bleeding   H/H stable     Review of Systems   Constitutional:  Negative for chills, diaphoresis and fever.   HENT:  Negative for ear pain and rhinorrhea.    Respiratory:  Negative for cough, chest tightness and shortness of breath.    Cardiovascular:  Negative for chest pain, palpitations and leg swelling.   Gastrointestinal:  Negative for abdominal pain, constipation, diarrhea, nausea and vomiting.   Genitourinary:  Negative for difficulty urinating, dysuria and flank pain.   Musculoskeletal:  Negative for back pain and joint swelling.   Neurological:  Negative for dizziness, weakness, light-headedness, numbness and headaches.   Psychiatric/Behavioral:  Negative for confusion.      Objective:     Vital Signs (Most Recent):  Temp: 99.4 °F (37.4 °C) (10/01/24 1528)  Pulse: 85 (10/01/24 1642)  Resp: 18 (10/01/24 1700)  BP: (!) 106/57 (10/01/24 1528)  SpO2: (!) 94 % (10/01/24 1528) Vital Signs (24h Range):  Temp:  [98 °F (36.7 °C)-99.4 °F (37.4 °C)] 99.4 °F (37.4 °C)  Pulse:  [] 85  Resp:  [14-19] 18  SpO2:  [92 %-100 %] 94 %  BP: (106-151)/(57-70) 106/57     Weight: 90 kg (198 lb 6.6 oz)  Body mass index is 29.3 kg/m².    Intake/Output Summary (Last 24 hours) at 10/1/2024 1804  Last data filed at 10/1/2024 1443  Gross per 24 hour   Intake --   Output 360 ml   Net -360  ml         Physical Exam  Constitutional:       Appearance: Normal appearance.   HENT:      Head: Normocephalic and atraumatic.   Cardiovascular:      Rate and Rhythm: Normal rate and regular rhythm.      Pulses: Normal pulses.   Pulmonary:      Effort: Pulmonary effort is normal.      Breath sounds: Normal breath sounds.   Abdominal:      General: Abdomen is flat. Bowel sounds are normal. There is no distension.      Palpations: Abdomen is soft.      Tenderness: There is no abdominal tenderness.   Musculoskeletal:         General: Normal range of motion.      Right lower leg: No edema.      Left lower leg: No edema.   Lymphadenopathy:      Cervical: No cervical adenopathy.   Neurological:      General: No focal deficit present.      Mental Status: He is alert and oriented to person, place, and time.             Significant Labs: All pertinent labs within the past 24 hours have been reviewed.    Significant Imaging: I have reviewed all pertinent imaging results/findings within the past 24 hours.    Assessment/Plan:      Altered mental status  Family is concerned about decline in mental status since admission  CT head negative  Likely hospital induced delirium  Will start seroquel at night         Gastrointestinal hemorrhage  Low GI bleed, presented with complaints of bleeding per rectum, FOBT positive on admission  GI consulted started on Protonix drip and taken for colonoscopy today with no acute findings  H/H have remained stable since admission  GI has signed off     Essential hypertension  Blood pressure has been controlled  Continue to monitor   Holding antihypertensives at the time     Stage 2 chronic kidney disease  Creatinine has been stable   Will continue IV fluids with normal saline since patient is not able to drink by mouth today      VTE Risk Mitigation (From admission, onward)           Ordered     IP VTE HIGH RISK PATIENT  Once         09/29/24 0412     Place sequential compression device  Until  discontinued         09/29/24 0412                    Discharge Planning   BRENDA:      Code Status: Full Code   Is the patient medically ready for discharge?:     Reason for patient still in hospital (select all that apply): Treatment  Discharge Plan A: Other (TBD)                  Ayala Williamson MD  Department of Hospital Medicine   [facility] - [address] [facility] [address]    Addendum: ROS unable to obtain due to altered mental status

## 2024-10-01 NOTE — SUBJECTIVE & OBJECTIVE
Interval History: patient is more awake today but drifts off on and off, per nurse he was restless last night and did not sleep. Will try Seroquel tonight for agitation. No additional episodes of bleeding   H/H stable     Review of Systems   Constitutional:  Negative for chills, diaphoresis and fever.   HENT:  Negative for ear pain and rhinorrhea.    Respiratory:  Negative for cough, chest tightness and shortness of breath.    Cardiovascular:  Negative for chest pain, palpitations and leg swelling.   Gastrointestinal:  Negative for abdominal pain, constipation, diarrhea, nausea and vomiting.   Genitourinary:  Negative for difficulty urinating, dysuria and flank pain.   Musculoskeletal:  Negative for back pain and joint swelling.   Neurological:  Negative for dizziness, weakness, light-headedness, numbness and headaches.   Psychiatric/Behavioral:  Negative for confusion.      Objective:     Vital Signs (Most Recent):  Temp: 99.4 °F (37.4 °C) (10/01/24 1528)  Pulse: 85 (10/01/24 1642)  Resp: 18 (10/01/24 1700)  BP: (!) 106/57 (10/01/24 1528)  SpO2: (!) 94 % (10/01/24 1528) Vital Signs (24h Range):  Temp:  [98 °F (36.7 °C)-99.4 °F (37.4 °C)] 99.4 °F (37.4 °C)  Pulse:  [] 85  Resp:  [14-19] 18  SpO2:  [92 %-100 %] 94 %  BP: (106-151)/(57-70) 106/57     Weight: 90 kg (198 lb 6.6 oz)  Body mass index is 29.3 kg/m².    Intake/Output Summary (Last 24 hours) at 10/1/2024 1804  Last data filed at 10/1/2024 1443  Gross per 24 hour   Intake --   Output 360 ml   Net -360 ml         Physical Exam  Constitutional:       Appearance: Normal appearance.   HENT:      Head: Normocephalic and atraumatic.   Cardiovascular:      Rate and Rhythm: Normal rate and regular rhythm.      Pulses: Normal pulses.   Pulmonary:      Effort: Pulmonary effort is normal.      Breath sounds: Normal breath sounds.   Abdominal:      General: Abdomen is flat. Bowel sounds are normal. There is no distension.      Palpations: Abdomen is soft.       Tenderness: There is no abdominal tenderness.   Musculoskeletal:         General: Normal range of motion.      Right lower leg: No edema.      Left lower leg: No edema.   Lymphadenopathy:      Cervical: No cervical adenopathy.   Neurological:      General: No focal deficit present.      Mental Status: He is alert and oriented to person, place, and time.             Significant Labs: All pertinent labs within the past 24 hours have been reviewed.    Significant Imaging: I have reviewed all pertinent imaging results/findings within the past 24 hours.

## 2024-10-01 NOTE — PLAN OF CARE
Problem: Physical Therapy  Goal: Physical Therapy Goal  Description: Goals to be met by: 24     Patient will increase functional independence with mobility by performin. Supine to sit with Set-up [place]  2. Sit to supine with Set-up [place]  3. Sit to stand transfer with Stand-by Assistance  4. Gait  x 200 feet with Stand-by Assistance using Rolling Walker.   5. Ascend/descend 3 steps with Stand-by Assistance without using handrails     Outcome: Progressing

## 2024-10-01 NOTE — PLAN OF CARE
If your tested for COVID today, advised to follow CDC guidelines    Get plenty of rest and increase fluid intake.    For fever and pain:  Over-the-counter ibuprofen (Motrin, Advil) or acetaminophen (Tylenol) as needed if safe to take.    For sore throat:  Try saltwater gargles (mix half teaspoon of salt in a glass of warm water), over-the-counter throat lozenges or Chloraseptic spray.    For cough:  Try over-the-counter Mucinex (guaifenesin)-can help thin mucus to be cleared more easily.  Antitussive (dextromethorphan)-can help to suppress cough reflex.  It's always a good idea to  talk with the pharmacist regarding which over-the-counter cold and cough medication you can take safely    For sinus pressure and pain:  Over-the-counter decongestant like Sudafed (this medication can make you drowsy) or phenylephrine.       If symptoms are worsening or no improvement, see your primary doctor or go to emergency room       Problem: Adult Inpatient Plan of Care  Goal: Plan of Care Review  Outcome: Progressing  Goal: Patient-Specific Goal (Individualized)  Outcome: Progressing  Goal: Absence of Hospital-Acquired Illness or Injury  Outcome: Progressing  Goal: Optimal Comfort and Wellbeing  Outcome: Progressing  Goal: Readiness for Transition of Care  Outcome: Progressing     Problem: Wound  Goal: Optimal Coping  Outcome: Progressing  Goal: Optimal Functional Ability  Outcome: Progressing  Goal: Absence of Infection Signs and Symptoms  Outcome: Progressing  Goal: Improved Oral Intake  Outcome: Progressing  Goal: Optimal Pain Control and Function  Outcome: Progressing  Goal: Skin Health and Integrity  Outcome: Progressing  Goal: Optimal Wound Healing  Outcome: Progressing     Problem: Fall Injury Risk  Goal: Absence of Fall and Fall-Related Injury  Outcome: Progressing     Problem: Skin Injury Risk Increased  Goal: Skin Health and Integrity  Outcome: Progressing

## 2024-10-01 NOTE — PT/OT/SLP EVAL
Physical Therapy Evaluation    Patient Name:  Yusef Dodd   MRN:  [id number]    Recommendations:     Discharge therapy intensity: Moderate Intensity Therapy   Discharge Equipment Recommendations: none   Barriers to discharge: Impaired mobility and Ongoing medical needs    Assessment:     Yusef Dodd is a 89 y.o. male admitted with a medical diagnosis of GI hemorrhage. Prior to admit, patient lived alone in a SLH with 3 steps to enter. At baseline, he requires assistance with ADL's and ambulates with a rollator.  He presents with the following impairments/functional limitations: weakness, impaired endurance, impaired self care skills, impaired functional mobility, gait instability, impaired balance, decreased safety awareness, impaired cognition. Patient disoriented x 4 during session. He required MOD A of 2 for bed mobility. MOD A for sit<>stand. Able to take 3 side-steps with RW & MOD A. Limited by weakness. Patient will benefit from continued PT services while in hospital to improve functional mobility & return to PLOF. Recommending MOD intensity therapy at discharge. Progress as tolerated.     Rehab Prognosis: Good; patient would benefit from acute skilled PT services to address these deficits and reach maximum level of function.    Recent Surgery: Procedure(s) (LRB):  COLON (N/A) 1 Day Post-Op    Plan:     During this hospitalization, patient would benefit from acute PT services 5 x/week to address the identified rehab impairments via gait training, therapeutic activities, therapeutic exercises, neuromuscular re-education and progress toward the following goals:    Plan of Care Expires:  11/01/24    Subjective     Chief Complaint: none  Patient/Family Comments/goals: none  Pain/Comfort:  Pain Rating 1: 0/10    Patients cultural, spiritual, Episcopal conflicts given the current situation: no    Living Environment:  Prior to admit, patient lived alone in a SLH with 3 steps to enter. At baseline, he requires  assistance with ADL's and ambulates with a rollator. Family lives nearby and assistance with needs. Equipment used at home: rollator, cane, straight, walker, rolling (transport chair).  DME owned (not currently used): none.  Upon discharge, patient will have assistance from TBD.    Objective:     Communicated with nurse prior to session.  Patient found supine with telemetry, peripheral IV, pressure relief boots  upon PT entry to room.    General Precautions: Standard, fall  Orthopedic Precautions:N/A   Braces: N/A  Respiratory Status: Room air    Exams:  Cognitive Exam:  Patient is disoriented x 4  Sensation: -       Intact  RLE ROM: WFL  RLE Strength: -4/5 grossly  LLE ROM: WFL  LLE Strength: -4/5 grossly  Skin integrity: Visible skin intact      Functional Mobility:  Bed Mobility:  Supine to Sit: moderate assistance and of 2 persons  Sit to Supine: moderate assistance and of 2 persons  Transfers:  Sit to Stand:  moderate assistance with rolling walker  Gait: Able to take 3 side-steps with RW & MOD A. Limited by weakness.  Balance: fair/poor      AM-PAC 6 CLICK MOBILITY  Total Score:11     Education Provided:  Role and goals of PT, transfer training, bed mobility, gait training, balance training, safety awareness, assistive device, strengthening exercises, and importance of participating in PT to return to PLOF.    Patient left supine with all lines intact, call button in reach, and bed alarm on.    GOALS:   Multidisciplinary Problems       Physical Therapy Goals          Problem: Physical Therapy    Goal Priority Disciplines Outcome Interventions   Physical Therapy Goal     PT, PT/OT Progressing    Description: Goals to be met by: 24     Patient will increase functional independence with mobility by performin. Supine to sit with Set-up [place]  2. Sit to supine with Set-up [place]  3. Sit to stand transfer with Stand-by Assistance  4. Gait  x 200 feet with Stand-by Assistance using Rolling  [place].                          History:     Past Medical History:   Diagnosis Date    Essential (primary) hypertension     Gout     High cholesterol        Past Surgical History:   Procedure Laterality Date    COLONOSCOPY N/A 9/30/2024    Procedure: COLON;  Surgeon: Jens Lizama MD;  Location: [facility] ENDOSCOPY;  Service: Gastroenterology;  Laterality: N/A;    HERNIA REPAIR      HIP SURGERY      fracture of femur       Time Tracking:     PT Received On: 10/01/24  PT Start Time: 1530     PT Stop Time: 1550  PT Total Time (min): 20 min     Billable Minutes: Evaluation 20 minutes      10/01/2024

## 2024-10-01 NOTE — ASSESSMENT & PLAN NOTE
Family is concerned about decline in mental status since admission  CT head negative  Likely hospital induced delirium  Will start seroquel at night

## 2024-10-02 PROBLEM — G93.40 ENCEPHALOPATHY: Status: ACTIVE | Noted: 2024-10-02

## 2024-10-02 LAB
ALBUMIN SERPL-MCNC: 3 G/DL (ref 3.4–4.8)
ALBUMIN/GLOB SERPL: 1.4 RATIO (ref 1.1–2)
ALP SERPL-CCNC: 76 UNIT/L (ref 40–150)
ALT SERPL-CCNC: 22 UNIT/L (ref 0–55)
ANION GAP SERPL CALC-SCNC: 9 MEQ/L
AST SERPL-CCNC: 53 UNIT/L (ref 5–34)
BASOPHILS # BLD AUTO: 0.03 X10(3)/MCL
BASOPHILS NFR BLD AUTO: 0.4 %
BILIRUB SERPL-MCNC: 0.6 MG/DL
BUN SERPL-MCNC: 17.5 MG/DL (ref 8.4–25.7)
CALCIUM SERPL-MCNC: 8.4 MG/DL (ref 8.8–10)
CHLORIDE SERPL-SCNC: 117 MMOL/L (ref 98–111)
CO2 SERPL-SCNC: 22 MMOL/L (ref 23–31)
CREAT SERPL-MCNC: 1.18 MG/DL (ref 0.73–1.18)
CREAT/UREA NIT SERPL: 15
EOSINOPHIL # BLD AUTO: 0.1 X10(3)/MCL (ref 0–0.9)
EOSINOPHIL NFR BLD AUTO: 1.3 %
ERYTHROCYTE [DISTWIDTH] IN BLOOD BY AUTOMATED COUNT: 14.8 % (ref 11.5–17)
GFR SERPLBLD CREATININE-BSD FMLA CKD-EPI: 59 ML/MIN/1.73/M2
GLOBULIN SER-MCNC: 2.2 GM/DL (ref 2.4–3.5)
GLUCOSE SERPL-MCNC: 98 MG/DL (ref 75–121)
HCT VFR BLD AUTO: 31 % (ref 42–52)
HGB BLD-MCNC: 9.6 G/DL (ref 14–18)
IMM GRANULOCYTES # BLD AUTO: 0.03 X10(3)/MCL (ref 0–0.04)
IMM GRANULOCYTES NFR BLD AUTO: 0.4 %
LYMPHOCYTES # BLD AUTO: 0.78 X10(3)/MCL (ref 0.6–4.6)
LYMPHOCYTES NFR BLD AUTO: 10.4 %
MCH RBC QN AUTO: 29 PG (ref 27–31)
MCHC RBC AUTO-ENTMCNC: 31 G/DL (ref 33–36)
MCV RBC AUTO: 93.7 FL (ref 80–94)
MONOCYTES # BLD AUTO: 0.63 X10(3)/MCL (ref 0.1–1.3)
MONOCYTES NFR BLD AUTO: 8.4 %
NEUTROPHILS # BLD AUTO: 5.94 X10(3)/MCL (ref 2.1–9.2)
NEUTROPHILS NFR BLD AUTO: 79.1 %
NRBC BLD AUTO-RTO: 0 %
OSMOLALITY SERPL: 311 MOSM/KG (ref 280–300)
OSMOLALITY UR: 691 MOSM/KG (ref 300–1300)
PLATELET # BLD AUTO: 208 X10(3)/MCL (ref 130–400)
PMV BLD AUTO: 12.4 FL (ref 7.4–10.4)
POTASSIUM SERPL-SCNC: 3.5 MMOL/L (ref 3.5–5.1)
PROT SERPL-MCNC: 5.2 GM/DL (ref 5.8–7.6)
RBC # BLD AUTO: 3.31 X10(6)/MCL (ref 4.7–6.1)
SODIUM SERPL-SCNC: 148 MMOL/L (ref 136–145)
SODIUM UR-SCNC: 133 MMOL/L
WBC # BLD AUTO: 7.51 X10(3)/MCL (ref 4.5–11.5)

## 2024-10-02 PROCEDURE — 94640 AIRWAY INHALATION TREATMENT: CPT

## 2024-10-02 PROCEDURE — 87040 BLOOD CULTURE FOR BACTERIA: CPT | Performed by: STUDENT IN AN ORGANIZED HEALTH CARE EDUCATION/TRAINING PROGRAM

## 2024-10-02 PROCEDURE — 36415 COLL VENOUS BLD VENIPUNCTURE: CPT | Performed by: STUDENT IN AN ORGANIZED HEALTH CARE EDUCATION/TRAINING PROGRAM

## 2024-10-02 PROCEDURE — 25000003 PHARM REV CODE 250: Performed by: STUDENT IN AN ORGANIZED HEALTH CARE EDUCATION/TRAINING PROGRAM

## 2024-10-02 PROCEDURE — 99233 SBSQ HOSP IP/OBS HIGH 50: CPT | Mod: ,,, | Performed by: STUDENT IN AN ORGANIZED HEALTH CARE EDUCATION/TRAINING PROGRAM

## 2024-10-02 PROCEDURE — 63600175 PHARM REV CODE 636 W HCPCS: Performed by: INTERNAL MEDICINE

## 2024-10-02 PROCEDURE — 21400001 HC TELEMETRY ROOM

## 2024-10-02 PROCEDURE — 94760 N-INVAS EAR/PLS OXIMETRY 1: CPT

## 2024-10-02 PROCEDURE — 85025 COMPLETE CBC W/AUTO DIFF WBC: CPT | Performed by: STUDENT IN AN ORGANIZED HEALTH CARE EDUCATION/TRAINING PROGRAM

## 2024-10-02 PROCEDURE — 99900031 HC PATIENT EDUCATION (STAT)

## 2024-10-02 PROCEDURE — 63600175 PHARM REV CODE 636 W HCPCS

## 2024-10-02 PROCEDURE — 97530 THERAPEUTIC ACTIVITIES: CPT | Mod: CQ

## 2024-10-02 PROCEDURE — 84300 ASSAY OF URINE SODIUM: CPT | Performed by: STUDENT IN AN ORGANIZED HEALTH CARE EDUCATION/TRAINING PROGRAM

## 2024-10-02 PROCEDURE — 25000003 PHARM REV CODE 250: Performed by: INTERNAL MEDICINE

## 2024-10-02 PROCEDURE — 97535 SELF CARE MNGMENT TRAINING: CPT

## 2024-10-02 PROCEDURE — 83935 ASSAY OF URINE OSMOLALITY: CPT | Performed by: STUDENT IN AN ORGANIZED HEALTH CARE EDUCATION/TRAINING PROGRAM

## 2024-10-02 PROCEDURE — 25000242 PHARM REV CODE 250 ALT 637 W/ HCPCS: Performed by: STUDENT IN AN ORGANIZED HEALTH CARE EDUCATION/TRAINING PROGRAM

## 2024-10-02 PROCEDURE — 83930 ASSAY OF BLOOD OSMOLALITY: CPT | Performed by: STUDENT IN AN ORGANIZED HEALTH CARE EDUCATION/TRAINING PROGRAM

## 2024-10-02 PROCEDURE — 99900035 HC TECH TIME PER 15 MIN (STAT)

## 2024-10-02 PROCEDURE — 99222 1ST HOSP IP/OBS MODERATE 55: CPT | Mod: ,,, | Performed by: SPECIALIST

## 2024-10-02 PROCEDURE — 25000003 PHARM REV CODE 250

## 2024-10-02 PROCEDURE — 80053 COMPREHEN METABOLIC PANEL: CPT | Performed by: STUDENT IN AN ORGANIZED HEALTH CARE EDUCATION/TRAINING PROGRAM

## 2024-10-02 PROCEDURE — 97166 OT EVAL MOD COMPLEX 45 MIN: CPT

## 2024-10-02 RX ORDER — ACETAMINOPHEN 325 MG/1
650 TABLET ORAL EVERY 4 HOURS PRN
Status: DISCONTINUED | OUTPATIENT
Start: 2024-10-02 | End: 2024-10-08 | Stop reason: HOSPADM

## 2024-10-02 RX ADMIN — MICONAZOLE NITRATE: 20 POWDER TOPICAL at 08:10

## 2024-10-02 RX ADMIN — QUETIAPINE FUMARATE 25 MG: 25 TABLET ORAL at 08:10

## 2024-10-02 RX ADMIN — SODIUM CHLORIDE 8 MG/HR: 900 INJECTION INTRAVENOUS at 05:10

## 2024-10-02 RX ADMIN — Medication: at 09:10

## 2024-10-02 RX ADMIN — Medication: at 08:10

## 2024-10-02 RX ADMIN — HYDRALAZINE HYDROCHLORIDE 10 MG: 20 INJECTION INTRAMUSCULAR; INTRAVENOUS at 04:10

## 2024-10-02 RX ADMIN — SODIUM CHLORIDE: 9 INJECTION, SOLUTION INTRAVENOUS at 01:10

## 2024-10-02 RX ADMIN — METOPROLOL TARTRATE 5 MG: 1 INJECTION, SOLUTION INTRAVENOUS at 02:10

## 2024-10-02 RX ADMIN — ALBUTEROL SULFATE 2.5 MG: 2.5 SOLUTION RESPIRATORY (INHALATION) at 12:10

## 2024-10-02 RX ADMIN — ALBUTEROL SULFATE 2.5 MG: 2.5 SOLUTION RESPIRATORY (INHALATION) at 03:10

## 2024-10-02 RX ADMIN — ALBUTEROL SULFATE 2.5 MG: 2.5 SOLUTION RESPIRATORY (INHALATION) at 09:10

## 2024-10-02 RX ADMIN — MICONAZOLE NITRATE: 20 POWDER TOPICAL at 09:10

## 2024-10-02 RX ADMIN — METOPROLOL TARTRATE 5 MG: 1 INJECTION, SOLUTION INTRAVENOUS at 12:10

## 2024-10-02 RX ADMIN — METOPROLOL TARTRATE 5 MG: 1 INJECTION, SOLUTION INTRAVENOUS at 08:10

## 2024-10-02 RX ADMIN — ALLOPURINOL 300 MG: 300 TABLET ORAL at 09:10

## 2024-10-02 RX ADMIN — SODIUM CHLORIDE 8 MG/HR: 900 INJECTION INTRAVENOUS at 12:10

## 2024-10-02 RX ADMIN — FINASTERIDE 5 MG: 5 TABLET, FILM COATED ORAL at 09:10

## 2024-10-02 RX ADMIN — ALBUTEROL SULFATE 2.5 MG: 2.5 SOLUTION RESPIRATORY (INHALATION) at 08:10

## 2024-10-02 NOTE — PT/OT/SLP EVAL
Occupational Therapy  Evaluation    Name: Yusef Dodd  MRN: [id number]  Admitting Diagnosis: The primary encounter diagnosis was GI bleeding. Diagnoses of Gastrointestinal hemorrhage, unspecified gastrointestinal hemorrhage type, Gastrointestinal hemorrhage with melena, and Altered mental status, unspecified altered mental status type were also pertinent to this visit.    Recent Surgery: Procedure(s) (LRB):  COLON (N/A) 2 Days Post-Op    Recommendations:     Discharge therapy intensity: Moderate Intensity Therapy   Discharge Equipment Recommendations:   (tbd)  Barriers to discharge:  None    Assessment:     Yusef Dodd is a 89 y.o. male with a medical diagnosis of The primary encounter diagnosis was GI bleeding. Diagnoses of Gastrointestinal hemorrhage, unspecified gastrointestinal hemorrhage type, Gastrointestinal hemorrhage with melena, and Altered mental status, unspecified altered mental status type were also pertinent to this visit.  He presents with the following performance deficits affecting function: weakness, impaired endurance, impaired self care skills, impaired functional mobility, impaired cognition, decreased safety awareness.     At eval, pt initially very somnolent, not opening eyes to command or following any commands. Once seated EOB, pt with some increased alertness intermittently, talking some but not appropriate to questions or conversation, did attend briefly to son a few times during conversation and was able to fully state his name once when asked. Pt able to follow through with simple grooming to wipe his face once initiated with total assist. Noted with posterior and right lean throughout eOB but intermittent CGA briefly. At this time would recommend mod intensity therapy and will con't to update rec's as appropriate.     Rehab Prognosis: Good; patient would benefit from acute skilled OT services to address these deficits and reach maximum level of function.       Plan:     Patient to be  seen 5 x/week to address the above listed problems via self-care/home management, therapeutic activities, therapeutic exercises  Plan of Care Expires: 10/30/24  Plan of Care Reviewed with: patient, son, family    Subjective     Chief Complaint: u/a to state  Patient/Family Comments/goals: family wishes for return to PLOF  Occupational Profile:  Living Environment: lives alone in  home   Previous level of function: independent, uses rollator, sometimes assist with ADL's  Roles and Routines: dad  Equipment Used at Home: walker, rolling, rollator, cane, straight (transport chair)  Assistance upon Discharge: yes, from family who lives close by    Pain/Comfort:  Pain Rating 1: 0/10    Patients cultural, spiritual, Jehovah's witness conflicts given the current situation:      Objective:     OT communicated with nsg and PTA prior to session.      Patient was found supine with telemetry, peripheral IV, pressure relief boots, bed alarm, PureWick upon OT entry to room.    General Precautions: Standard, fall  Orthopedic Precautions:    Braces:      Vital Signs:     Bed Mobility:    Sup to sit with total assist    Functional Mobility/Transfers:  Deferred 2/2 poor alertness/safety  Functional Mobility: tolerated EOB x ~10-15 min    Activities of Daily Living:  Simple grooming to wipe face with max assist, total assist to initiate and able to attempt to follow through with task    AMPAC 6 Click ADL:  AMPAC Total Score:      Functional Cognition:  Able to state his name once, not following commands, attempted to follow through with washing his face automatically once initiated.    Visual Perceptual Skills:  U/a to determine    Upper Extremity Function:  Right Upper Extremity:   Appears grossly wfl per observation    Left Upper Extremity:  Appears grossly wfl per observation    Balance:   Occasional CGA sitting briefly at times but mainly total assist with posterior and R lean    Additional Treatment:  Cog stim/ADL activities, family  education    Patient Education:  Patient and family were provided with verbal education education regarding OT role/goals/POC and Discharge/DME recommendations.  Additional teaching is warranted.     Patient left HOB elevated with all lines intact, call button in reach, and family present bed alarm on    GOALS:   Multidisciplinary Problems       Occupational Therapy Goals          Problem: Occupational Therapy    Goal Priority Disciplines Outcome Interventions   Occupational Therapy Goal     OT, PT/OT Progressing    Description: Goals to be met by: 10/30/24     Patient will increase functional independence with ADLs by performing:    UE Dressing with Modified [place] and Supervision.  LE Dressing with Modified [place] and Supervision.  Grooming while seated with Modified [place] and Supervision. Progress as appropriate  Toileting from bedside commode with Modified [place] and Supervision for hygiene and clothing management.   Toilet transfer to bedside commode with Modified [place] and Supervision. Progress as appropriate.                         History:     Past Medical History:   Diagnosis Date    Essential (primary) hypertension     Gout     High cholesterol          Past Surgical History:   Procedure Laterality Date    COLONOSCOPY N/A 9/30/2024    Procedure: COLON;  Surgeon: Jens Lizama MD;  Location: [facility] ENDOSCOPY;  Service: Gastroenterology;  Laterality: N/A;    HERNIA REPAIR      HIP SURGERY      fracture of femur       Time Tracking:     OT Date of Treatment: 10/02/24  OT Start Time: 0939  OT Stop Time: 1005  OT Total Time (min): 26 min    Billable Minutes:Evaluation 16 min  Self Care/Home Management 10 min    10/2/2024

## 2024-10-02 NOTE — CONSULTS
[facility] - [address] Med Surg  Neurology  Consult Note    Patient Name: Yusef Dodd  MRN: [id number]  Admission Date: 9/28/2024  Hospital Length of Stay: 3 days  Code Status: Full Code   Attending Provider: Ayala Villagomez, *   Consulting Provider: Niya Ackerman [facility]  Primary Care Physician: Ayala Villagomez MD  Principal Problem:<principal problem not specified>    Inpatient consult to Neurology  Consult performed by: Niya Ackerman FN  Consult ordered by: Ayala Villagomez MD         Subjective:     Chief Complaint:       HPI:   89 year-old male with PMH HTN, gout, and HLD who transferred from [facility] on 09/28 for lower GI bleed.  Throughout admission patient has undergone colonoscopy without acute findings and GI has signed off.  Patient is waxing and waning as well as insomnia for which he was started on Seroquel for.  Patient's family endorsing concerns regarding mentation requesting neurologic evaluation.    CT head without negative for acute intracranial abnormalities.     Past Medical History:   Diagnosis Date    Essential (primary) hypertension     Gout     High cholesterol        Past Surgical History:   Procedure Laterality Date    COLONOSCOPY N/A 9/30/2024    Procedure: COLON;  Surgeon: Jens Lizama MD;  Location: [facility] ENDOSCOPY;  Service: Gastroenterology;  Laterality: N/A;    HERNIA REPAIR      HIP SURGERY      fracture of femur       Review of patient's allergies indicates:   Allergen Reactions    Penicillin Nausea And Vomiting       Current Neurological Medications:     No current facility-administered medications on file prior to encounter.     Current Outpatient Medications on File Prior to Encounter   Medication Sig    allopurinoL (ZYLOPRIM) 300 MG tablet Take 1 tablet (300 mg total) by mouth once daily.    finasteride (PROSCAR) 5 mg tablet Take 1 tablet (5 mg total) by mouth once daily.    pravastatin (PRAVACHOL) 20 MG tablet Take  1 tablet (20 mg total) by mouth once daily.    acetaminophen (TYLENOL) 500 MG tablet Take 500 mg by mouth every 6 (six) hours as needed for Pain.    albuterol (PROVENTIL) 2.5 mg /3 mL (0.083 %) nebulizer solution Take 3 mLs (2.5 mg total) by nebulization every 4 (four) hours. Rescue (Patient not taking: Reported on 6/13/2024)    amLODIPine (NORVASC) 10 MG tablet Take 1 tablet (10 mg total) by mouth once daily.    aspirin 500 MG tablet Take 500 mg by mouth every 6 (six) hours as needed for Pain. (Patient not taking: Reported on 6/13/2024)    hydrALAZINE (APRESOLINE) 25 MG tablet Take 1 tablet (25 mg total) by mouth 2 (two) times daily.    levalbuterol (XOPENEX HFA) 45 mcg/actuation inhaler Inhale into the lungs. (Patient not taking: Reported on 6/13/2024)    lisinopriL (PRINIVIL,ZESTRIL) 40 MG tablet Take 1 tablet (40 mg total) by mouth once daily.    nitrofurantoin, macrocrystal-monohydrate, (MACROBID) 100 MG capsule Take 1 capsule (100 mg total) by mouth 2 (two) times daily.     Family History    Family history is unknown by patient.       Tobacco Use    Smoking status: Never    Smokeless tobacco: Never   Substance and Sexual Activity    Alcohol use: Not Currently    Drug use: Never    Sexual activity: Not Currently     Review of Systems   Unable to perform ROS: Acuity of condition       Objective:     Vital Signs (Most Recent):  Temp: 99 °F (37.2 °C) (10/02/24 1055)  Pulse: 98 (After giving prn Lopressor) (10/02/24 1426)  Resp: 18 (10/02/24 1232)  BP: 139/72 (10/02/24 1426)  SpO2: 97 % (10/02/24 1055) Vital Signs (24h Range):  Temp:  [98.4 °F (36.9 °C)-99.4 °F (37.4 °C)] 99 °F (37.2 °C)  Pulse:  [] 98  Resp:  [18-20] 18  SpO2:  [94 %-97 %] 97 %  BP: (106-175)/(53-85) 139/72     Weight: 90 kg (198 lb 6.6 oz)  Body mass index is 29.3 kg/m².     Physical Exam  Vitals reviewed.   Constitutional:       General: He is awake.   HENT:      Head: Normocephalic and atraumatic.      Mouth/Throat:      Mouth: Mucous  membranes are dry.      Pharynx: Oropharynx is clear.   Eyes:      Pupils: Pupils are equal, round, and reactive to light.   Pulmonary:      Effort: Pulmonary effort is normal.   Skin:     General: Skin is warm and dry.          NEUROLOGICAL EXAMINATION:     MENTAL STATUS        Disoriented, awake, does not follow commands       CRANIAL NERVES     CN III, IV, VI   Pupils are equal, round, and reactive to light.  Nystagmus: none   Conjugate gaze: present    MOTOR EXAM        Moves all extremities spontaneously, no focal weakness noted     REFLEXES     Reflexes   Right plantar: normal  Left plantar: normal  Right ankle clonus: absent  Left ankle clonus: absent      Significant Labs:   Recent Lab Results         10/02/24  0511   10/02/24  0510   10/02/24  0357   10/01/24  1830        Albumin/Globulin Ratio     1.4   1.2       Albumin     3.0   2.8       ALP     76   70       ALT     22   19       Anion Gap     9.0   8.0       AST     53   50       Baso #     0.03   0.01       Basophil %     0.4   0.1       BILIRUBIN TOTAL     0.6   0.5       BUN     17.5   19.9       BUN/CREAT RATIO     15   16       Calcium     8.4   8.3       Chloride     117   119       CO2     22   22       Creatinine     1.18   1.24       eGFR     59   55       Eos #     0.10   0.04       Eos %     1.3   0.6       Globulin, Total     2.2   2.3       Glucose     98   121       Hematocrit     31.0   29.4       Hemoglobin     9.6   9.3       Immature Grans (Abs)     0.03   0.03       Immature Granulocytes     0.4   0.4       Lymph #     0.78   0.59       LYMPH %     10.4   8.4       MCH     29.0   30.1       MCHC     31.0   31.6       MCV     93.7   95.1       Mono #     0.63   0.65       Mono %     8.4   9.2       MPV     12.4   11.8       Neut #     5.94   5.71       Neut %     79.1   81.3       nRBC     0.0   0.0       Osmolality     311         Urine Osmolality   691           Platelet Count     208   184       Potassium     3.5   3.2        PROTEIN TOTAL     5.2   5.1       RBC     3.31   3.09       RDW     14.8   14.8       Sodium     148   149       Sodium, Urine 133.0             WBC     7.51   7.03               Significant Imaging:   CT head w/o:  Impression:     1.  No acute intracranial findings identified.     2.  Chronic microangiopathic ischemia and atrophy.    I have reviewed all pertinent imaging results/findings within the past 24 hours.  Assessment and Plan:     Encephalopathy  Multifactorial    -safety/fall precautions  -management of metabolic insults per primary team         VTE Risk Mitigation (From admission, onward)           Ordered     IP VTE HIGH RISK PATIENT  Once         09/29/24 0412     Place sequential compression device  Until discontinued         09/29/24 0412                    Thank you for your consult.  Further recommendations to follow per MD Niya Ackerman, HARPREET-BC  Inpatient Neurology  [facility] - [address] [place] [address] Surg

## 2024-10-02 NOTE — PLAN OF CARE
10/02/24 0929   Discharge Reassessment   Assessment Type Discharge Planning Reassessment   Discharge Plan discussed with: POA   Name(s) and Number(s) Rocky Mack   Discharge Plan A Skilled Nursing Facility   Discharge Plan B Skilled Nursing Facility   Post-Acute Status   Post-Acute Authorization Placement   Post-Acute Placement Status Referrals Sent  ([facility])     Notified April, SSC requesting referral be sent to [facility]. Rocky made decision but will have a family meeting this afternoon to discuss with patient's son and daughters.

## 2024-10-02 NOTE — PT/OT/SLP PROGRESS
Physical Therapy Treatment    Patient Name:  Yusef Dodd   MRN:  [id number]    Recommendations:     Discharge therapy intensity: Moderate Intensity Therapy   Discharge Equipment Recommendations: none  Barriers to discharge: Impaired mobility, Ongoing medical needs, and placement.    Assessment:     Yusef Dodd is a 89 y.o. male admitted with a medical diagnosis of GI hemorrhage.  He presents with the following impairments/functional limitations: weakness, impaired endurance, impaired self care skills, impaired functional mobility, gait instability, impaired balance, decreased safety awareness, impaired cognition.    Pt was total-maxA for all functional mobility; brief periods of CGA while seated EOB w/posterior R lean. Pt only oriented to self; unable to identify his family members in the room or state his location. He would open his eyes for brief periods of time while EOB and only followed one command to wipe his face, totalA to finish task after initiated.     Rehab Prognosis: Fair; patient would benefit from acute skilled PT services to address these deficits and reach maximum level of function.    Recent Surgery: Procedure(s) (LRB):  COLON (N/A) 2 Days Post-Op    Plan:     During this hospitalization, patient would benefit from acute PT services 5 x/week to address the identified rehab impairments via gait training, therapeutic activities, therapeutic exercises, neuromuscular re-education and progress toward the following goals:    Plan of Care Expires:  11/01/24    Subjective     Chief Complaint: DEVAN  Patient/Family Comments/goals: Get back to PLOF  Pain/Comfort:         Objective:     Communicated with RN prior to session.  Patient found HOB elevated with telemetry, peripheral IV, pressure relief boots upon PT entry to room.     General Precautions: Standard, fall  Orthopedic Precautions: N/A  Braces: N/A  Respiratory Status: Room air  Skin Integrity: Visible skin intact      Functional Mobility:  Bed  Mobility:     Scooting: total assistance and of 2 persons  Supine to Sit: total assistance and of 2 persons  Sit to Supine: total assistance and of 2 persons  Balance: MaxA-CGA    Therapeutic Activities/Exercises:  Pt sat EOB ~10-15 min MaxA w/posterior R lean; brief periods of CGA. Some increased alertness while EOB, but kept eyes closed majority of the time w/~10% command following.  See OT note for more info on ADL's    Education:  Patient provided with verbal education education regarding PT role/goals/POC, fall prevention, and safety awareness.  Additional teaching is warranted.     Patient left HOB elevated with all lines intact, call button in reach, bed alarm on, RN notified, and family present    GOALS:   Multidisciplinary Problems       Physical Therapy Goals          Problem: Physical Therapy    Goal Priority Disciplines Outcome Interventions   Physical Therapy Goal     PT, PT/OT Progressing    Description: Goals to be met by: 24     Patient will increase functional independence with mobility by performin. Supine to sit with Set-up [place]  2. Sit to supine with Set-up [place]  3. Sit to stand transfer with Stand-by Assistance  4. Gait  x 200 feet with Stand-by Assistance using Rolling Walker.   5. Ascend/descend 3 steps with Stand-by Assistance without using handrails                          Time Tracking:     PT Received On: 10/02/24  PT Start Time: 939     PT Stop Time: 1005  PT Total Time (min): 26 min     Billable Minutes: Therapeutic Activity 2    Treatment Type: Treatment  PT/PTA: PTA     Number of PTA visits since last PT visit: 1     10/02/2024

## 2024-10-02 NOTE — SUBJECTIVE & OBJECTIVE
Past Medical History:   Diagnosis Date    Essential (primary) hypertension     Gout     High cholesterol        Past Surgical History:   Procedure Laterality Date    COLONOSCOPY N/A 9/30/2024    Procedure: COLON;  Surgeon: Jens Lizama MD;  Location: [facility] ENDOSCOPY;  Service: Gastroenterology;  Laterality: N/A;    HERNIA REPAIR      HIP SURGERY      fracture of femur       Review of patient's allergies indicates:   Allergen Reactions    Penicillin Nausea And Vomiting       Current Neurological Medications:     No current facility-administered medications on file prior to encounter.     Current Outpatient Medications on File Prior to Encounter   Medication Sig    allopurinoL (ZYLOPRIM) 300 MG tablet Take 1 tablet (300 mg total) by mouth once daily.    finasteride (PROSCAR) 5 mg tablet Take 1 tablet (5 mg total) by mouth once daily.    pravastatin (PRAVACHOL) 20 MG tablet Take 1 tablet (20 mg total) by mouth once daily.    acetaminophen (TYLENOL) 500 MG tablet Take 500 mg by mouth every 6 (six) hours as needed for Pain.    albuterol (PROVENTIL) 2.5 mg /3 mL (0.083 %) nebulizer solution Take 3 mLs (2.5 mg total) by nebulization every 4 (four) hours. Rescue (Patient not taking: Reported on 6/13/2024)    amLODIPine (NORVASC) 10 MG tablet Take 1 tablet (10 mg total) by mouth once daily.    aspirin 500 MG tablet Take 500 mg by mouth every 6 (six) hours as needed for Pain. (Patient not taking: Reported on 6/13/2024)    hydrALAZINE (APRESOLINE) 25 MG tablet Take 1 tablet (25 mg total) by mouth 2 (two) times daily.    levalbuterol (XOPENEX HFA) 45 mcg/actuation inhaler Inhale into the lungs. (Patient not taking: Reported on 6/13/2024)    lisinopriL (PRINIVIL,ZESTRIL) 40 MG tablet Take 1 tablet (40 mg total) by mouth once daily.    nitrofurantoin, macrocrystal-monohydrate, (MACROBID) 100 MG capsule Take 1 capsule (100 mg total) by mouth 2 (two) times daily.     Family History    Family history is unknown by  patient.       Tobacco Use    Smoking status: Never    Smokeless tobacco: Never   Substance and Sexual Activity    Alcohol use: Not Currently    Drug use: Never    Sexual activity: Not Currently     Review of Systems   Unable to perform ROS: Acuity of condition       Objective:     Vital Signs (Most Recent):  Temp: 99 °F (37.2 °C) (10/02/24 1055)  Pulse: 98 (After giving prn Lopressor) (10/02/24 1426)  Resp: 18 (10/02/24 1232)  BP: 139/72 (10/02/24 1426)  SpO2: 97 % (10/02/24 1055) Vital Signs (24h Range):  Temp:  [98.4 °F (36.9 °C)-99.4 °F (37.4 °C)] 99 °F (37.2 °C)  Pulse:  [] 98  Resp:  [18-20] 18  SpO2:  [94 %-97 %] 97 %  BP: (106-175)/(53-85) 139/72     Weight: 90 kg (198 lb 6.6 oz)  Body mass index is 29.3 kg/m².     Physical Exam  Vitals reviewed.   Constitutional:       General: He is awake.   HENT:      Head: Normocephalic and atraumatic.      Mouth/Throat:      Mouth: Mucous membranes are dry.      Pharynx: Oropharynx is clear.   Eyes:      Pupils: Pupils are equal, round, and reactive to light.   Pulmonary:      Effort: Pulmonary effort is normal.   Skin:     General: Skin is warm and dry.          NEUROLOGICAL EXAMINATION:     MENTAL STATUS        Disoriented, awake, does not follow commands       CRANIAL NERVES     CN III, IV, VI   Pupils are equal, round, and reactive to light.  Nystagmus: none   Conjugate gaze: present    MOTOR EXAM        Moves all extremities spontaneously, no focal weakness noted     REFLEXES     Reflexes   Right plantar: normal  Left plantar: normal  Right ankle clonus: absent  Left ankle clonus: absent      Significant Labs:   Recent Lab Results         10/02/24  0511   10/02/24  0510   10/02/24  0357   10/01/24  1830        Albumin/Globulin Ratio     1.4   1.2       Albumin     3.0   2.8       ALP     76   70       ALT     22   19       Anion Gap     9.0   8.0       AST     53   50       Baso #     0.03   0.01       Basophil %     0.4   0.1       BILIRUBIN TOTAL     0.6    0.5       BUN     17.5   19.9       BUN/CREAT RATIO     15   16       Calcium     8.4   8.3       Chloride     117   119       CO2     22   22       Creatinine     1.18   1.24       eGFR     59   55       Eos #     0.10   0.04       Eos %     1.3   0.6       Globulin, Total     2.2   2.3       Glucose     98   121       Hematocrit     31.0   29.4       Hemoglobin     9.6   9.3       Immature Grans (Abs)     0.03   0.03       Immature Granulocytes     0.4   0.4       Lymph #     0.78   0.59       LYMPH %     10.4   8.4       MCH     29.0   30.1       MCHC     31.0   31.6       MCV     93.7   95.1       Mono #     0.63   0.65       Mono %     8.4   9.2       MPV     12.4   11.8       Neut #     5.94   5.71       Neut %     79.1   81.3       nRBC     0.0   0.0       Osmolality     311         Urine Osmolality   691           Platelet Count     208   184       Potassium     3.5   3.2       PROTEIN TOTAL     5.2   5.1       RBC     3.31   3.09       RDW     14.8   14.8       Sodium     148   149       Sodium, Urine 133.0             WBC     7.51   7.03               Significant Imaging:   CT head w/o:  Impression:     1.  No acute intracranial findings identified.     2.  Chronic microangiopathic ischemia and atrophy.    I have reviewed all pertinent imaging results/findings within the past 24 hours.

## 2024-10-02 NOTE — HPI
89 year-old male with PMH HTN, gout, and HLD who transferred from [facility] on 09/28 for lower GI bleed.  Throughout admission patient has undergone colonoscopy without acute findings and GI has signed off.  Patient is waxing and waning as well as insomnia for which he was started on Seroquel for.  Patient's family endorsing concerns regarding mentation requesting neurologic evaluation.    CT head without negative for acute intracranial abnormalities.

## 2024-10-02 NOTE — PLAN OF CARE
Problem: Occupational Therapy  Goal: Occupational Therapy Goal  Description: Goals to be met by: 10/30/24     Patient will increase functional independence with ADLs by performing:    UE Dressing with Modified [place] and Supervision.  LE Dressing with Modified [place] and Supervision.  Grooming while seated with Modified [place] and Supervision. Progress as appropriate  Toileting from bedside commode with Modified [place] and Supervision for hygiene and clothing management.   Toilet transfer to bedside commode with Modified [place] and Supervision. Progress as appropriate.    Outcome: Progressing

## 2024-10-03 LAB
ALBUMIN SERPL-MCNC: 2.7 G/DL (ref 3.4–4.8)
ALBUMIN/GLOB SERPL: 1.1 RATIO (ref 1.1–2)
ALP SERPL-CCNC: 70 UNIT/L (ref 40–150)
ALT SERPL-CCNC: 22 UNIT/L (ref 0–55)
ANION GAP SERPL CALC-SCNC: 5 MEQ/L
AST SERPL-CCNC: 47 UNIT/L (ref 5–34)
BACTERIA #/AREA URNS AUTO: ABNORMAL /HPF
BASOPHILS # BLD AUTO: 0.02 X10(3)/MCL
BASOPHILS NFR BLD AUTO: 0.3 %
BILIRUB SERPL-MCNC: 0.7 MG/DL
BILIRUB UR QL STRIP.AUTO: NEGATIVE
BUN SERPL-MCNC: 16.5 MG/DL (ref 8.4–25.7)
CALCIUM SERPL-MCNC: 8.2 MG/DL (ref 8.8–10)
CHLORIDE SERPL-SCNC: 119 MMOL/L (ref 98–111)
CLARITY UR: CLEAR
CO2 SERPL-SCNC: 23 MMOL/L (ref 23–31)
COLOR UR AUTO: ABNORMAL
CREAT SERPL-MCNC: 1.19 MG/DL (ref 0.73–1.18)
CREAT/UREA NIT SERPL: 14
EOSINOPHIL # BLD AUTO: 0.2 X10(3)/MCL (ref 0–0.9)
EOSINOPHIL NFR BLD AUTO: 2.8 %
ERYTHROCYTE [DISTWIDTH] IN BLOOD BY AUTOMATED COUNT: 14.8 % (ref 11.5–17)
GFR SERPLBLD CREATININE-BSD FMLA CKD-EPI: 58 ML/MIN/1.73/M2
GLOBULIN SER-MCNC: 2.4 GM/DL (ref 2.4–3.5)
GLUCOSE SERPL-MCNC: 87 MG/DL (ref 75–121)
GLUCOSE UR QL STRIP: NORMAL
HCT VFR BLD AUTO: 29.7 % (ref 42–52)
HGB BLD-MCNC: 9.2 G/DL (ref 14–18)
HGB UR QL STRIP: NEGATIVE
IMM GRANULOCYTES # BLD AUTO: 0.03 X10(3)/MCL (ref 0–0.04)
IMM GRANULOCYTES NFR BLD AUTO: 0.4 %
KETONES UR QL STRIP: NEGATIVE
LEUKOCYTE ESTERASE UR QL STRIP: 75
LYMPHOCYTES # BLD AUTO: 0.81 X10(3)/MCL (ref 0.6–4.6)
LYMPHOCYTES NFR BLD AUTO: 11.5 %
MCH RBC QN AUTO: 29.8 PG (ref 27–31)
MCHC RBC AUTO-ENTMCNC: 31 G/DL (ref 33–36)
MCV RBC AUTO: 96.1 FL (ref 80–94)
MONOCYTES # BLD AUTO: 0.66 X10(3)/MCL (ref 0.1–1.3)
MONOCYTES NFR BLD AUTO: 9.3 %
MUCOUS THREADS URNS QL MICRO: ABNORMAL /LPF
NEUTROPHILS # BLD AUTO: 5.35 X10(3)/MCL (ref 2.1–9.2)
NEUTROPHILS NFR BLD AUTO: 75.7 %
NITRITE UR QL STRIP: NEGATIVE
NRBC BLD AUTO-RTO: 0 %
PH UR STRIP: 5 [PH]
PLATELET # BLD AUTO: 176 X10(3)/MCL (ref 130–400)
PMV BLD AUTO: 12.3 FL (ref 7.4–10.4)
POTASSIUM SERPL-SCNC: 3.6 MMOL/L (ref 3.5–5.1)
PROT SERPL-MCNC: 5.1 GM/DL (ref 5.8–7.6)
PROT UR QL STRIP: NEGATIVE
RBC # BLD AUTO: 3.09 X10(6)/MCL (ref 4.7–6.1)
RBC #/AREA URNS AUTO: ABNORMAL /HPF
SODIUM SERPL-SCNC: 147 MMOL/L (ref 136–145)
SP GR UR STRIP.AUTO: 1.02 (ref 1–1.03)
SQUAMOUS #/AREA URNS LPF: ABNORMAL /HPF
UROBILINOGEN UR STRIP-ACNC: NORMAL
WBC # BLD AUTO: 7.07 X10(3)/MCL (ref 4.5–11.5)
WBC #/AREA URNS AUTO: ABNORMAL /HPF

## 2024-10-03 PROCEDURE — 99900031 HC PATIENT EDUCATION (STAT)

## 2024-10-03 PROCEDURE — 25000242 PHARM REV CODE 250 ALT 637 W/ HCPCS: Performed by: STUDENT IN AN ORGANIZED HEALTH CARE EDUCATION/TRAINING PROGRAM

## 2024-10-03 PROCEDURE — 94640 AIRWAY INHALATION TREATMENT: CPT

## 2024-10-03 PROCEDURE — 63600175 PHARM REV CODE 636 W HCPCS: Performed by: INTERNAL MEDICINE

## 2024-10-03 PROCEDURE — 81001 URINALYSIS AUTO W/SCOPE: CPT | Performed by: STUDENT IN AN ORGANIZED HEALTH CARE EDUCATION/TRAINING PROGRAM

## 2024-10-03 PROCEDURE — 97530 THERAPEUTIC ACTIVITIES: CPT | Mod: CQ

## 2024-10-03 PROCEDURE — 25000003 PHARM REV CODE 250: Performed by: INTERNAL MEDICINE

## 2024-10-03 PROCEDURE — 94660 CPAP INITIATION&MGMT: CPT

## 2024-10-03 PROCEDURE — 80053 COMPREHEN METABOLIC PANEL: CPT | Performed by: STUDENT IN AN ORGANIZED HEALTH CARE EDUCATION/TRAINING PROGRAM

## 2024-10-03 PROCEDURE — 85025 COMPLETE CBC W/AUTO DIFF WBC: CPT | Performed by: STUDENT IN AN ORGANIZED HEALTH CARE EDUCATION/TRAINING PROGRAM

## 2024-10-03 PROCEDURE — 99900035 HC TECH TIME PER 15 MIN (STAT)

## 2024-10-03 PROCEDURE — 27000190 HC CPAP FULL FACE MASK W/VALVE

## 2024-10-03 PROCEDURE — 99233 SBSQ HOSP IP/OBS HIGH 50: CPT | Mod: ,,, | Performed by: STUDENT IN AN ORGANIZED HEALTH CARE EDUCATION/TRAINING PROGRAM

## 2024-10-03 PROCEDURE — 94760 N-INVAS EAR/PLS OXIMETRY 1: CPT

## 2024-10-03 PROCEDURE — 94761 N-INVAS EAR/PLS OXIMETRY MLT: CPT

## 2024-10-03 PROCEDURE — 36415 COLL VENOUS BLD VENIPUNCTURE: CPT | Performed by: STUDENT IN AN ORGANIZED HEALTH CARE EDUCATION/TRAINING PROGRAM

## 2024-10-03 PROCEDURE — 21400001 HC TELEMETRY ROOM

## 2024-10-03 PROCEDURE — 25000003 PHARM REV CODE 250: Performed by: STUDENT IN AN ORGANIZED HEALTH CARE EDUCATION/TRAINING PROGRAM

## 2024-10-03 RX ADMIN — ALBUTEROL SULFATE 2.5 MG: 2.5 SOLUTION RESPIRATORY (INHALATION) at 04:10

## 2024-10-03 RX ADMIN — HALOPERIDOL LACTATE 5 MG: 5 INJECTION, SOLUTION INTRAMUSCULAR at 03:10

## 2024-10-03 RX ADMIN — ALBUTEROL SULFATE 2.5 MG: 2.5 SOLUTION RESPIRATORY (INHALATION) at 12:10

## 2024-10-03 RX ADMIN — ALBUTEROL SULFATE 2.5 MG: 2.5 SOLUTION RESPIRATORY (INHALATION) at 08:10

## 2024-10-03 RX ADMIN — ALBUTEROL SULFATE 2.5 MG: 2.5 SOLUTION RESPIRATORY (INHALATION) at 07:10

## 2024-10-03 RX ADMIN — SODIUM CHLORIDE: 9 INJECTION, SOLUTION INTRAVENOUS at 03:10

## 2024-10-03 RX ADMIN — METOPROLOL TARTRATE 5 MG: 1 INJECTION, SOLUTION INTRAVENOUS at 04:10

## 2024-10-03 RX ADMIN — ALBUTEROL SULFATE 2.5 MG: 2.5 SOLUTION RESPIRATORY (INHALATION) at 11:10

## 2024-10-03 RX ADMIN — Medication: at 09:10

## 2024-10-03 RX ADMIN — MICONAZOLE NITRATE: 20 POWDER TOPICAL at 09:10

## 2024-10-03 RX ADMIN — FINASTERIDE 5 MG: 5 TABLET, FILM COATED ORAL at 09:10

## 2024-10-03 RX ADMIN — ALLOPURINOL 300 MG: 300 TABLET ORAL at 09:10

## 2024-10-03 RX ADMIN — SODIUM CHLORIDE: 9 INJECTION, SOLUTION INTRAVENOUS at 01:10

## 2024-10-03 NOTE — PLAN OF CARE
@1790 Received a call from [facility] stating that they didn't feel that patiant was medically ready for snf admission, but would like to continue receiving clinicals to see if anything changes  [facility] sent clinicals to [facility] via [facility]. PASSAR/142 uploaded to [employer] | [employer] asd well.

## 2024-10-03 NOTE — ASSESSMENT & PLAN NOTE
Family is concerned about decline in mental status since admission  CT head negative  Likely hospital induced delirium  Today has been more lethargic, will decrease dose of Seroquel  UA obtained and negative  Blood cultures obtained, in process   Neurology consulted, no further recommendations

## 2024-10-03 NOTE — PT/OT/SLP PROGRESS
Physical Therapy Treatment    Patient Name:  Yusef Dodd   MRN:  [id number]    Recommendations:     Discharge therapy intensity: Moderate Intensity Therapy   Discharge Equipment Recommendations: to be determined by next level of care  Barriers to discharge: Impaired mobility, Ongoing medical needs, and placement.    Assessment:     Yusef Dodd is a 89 y.o. male admitted with a medical diagnosis of GI hemorrhage, encephalopathy.  He presents with the following impairments/functional limitations: weakness, gait instability, impaired balance, impaired endurance, decreased safety awareness, impaired self care skills, impaired functional mobility.    RN and family report pt has been sleepy. Daughter reports pt is more alert in the afternoon compared to the morning. Pt drowsy but arousable, improved once seated at EOB and cold wash rag utilized. Able to progress to transfers and side steps. Remains very unsteady and pleasantly confused. Difficult to understand pt's speech and pt tends to speak in Cajun Urdu.     Rehab Prognosis: Fair; patient would benefit from acute skilled PT services to address these deficits and reach maximum level of function.    Recent Surgery: Procedure(s) (LRB):  COLON (N/A) 3 Days Post-Op    Plan:     During this hospitalization, patient would benefit from acute PT services 5 x/week to address the identified rehab impairments via gait training, therapeutic activities, therapeutic exercises and progress toward the following goals:    Plan of Care Expires:  11/01/24    Subjective     Chief Complaint: pt unable to state  Patient/Family Comments/goals: Get back to PLOF  Pain/Comfort:  Pain Rating 1: 0/10      Objective:     Communicated with RN prior to session.  Patient found HOB elevated with telemetry, peripheral IV, pressure relief boots upon PT entry to room.     General Precautions: Standard, fall  Orthopedic Precautions: N/A  Braces: N/A  Respiratory Status: Room air  Skin Integrity:  Visible skin intact      Functional Mobility:  Bed Mobility:     Supine to sit with total assist  Sit to supine with max assist x 2  Scooting anteriorly to EOB with min assist  Balance: Max assist progressing to CGA. Initially resisted trunk flexion  Transfers:   Sit<->stand x 2 trials with mod assist x 2; CORNELIUS LE tremors and difficulty reaching for walker noted  Side steps to HOB x 2 trials with 3-4 steps, assist to manage walker, mod assist 2/2 A/P trunk sway and unsteadiness.    Therapeutic Activities/Exercises:      Education:  Patient provided with verbal education education regarding PT role/goals/POC, fall prevention, and safety awareness.  Additional teaching is warranted.     Patient left HOB elevated with all lines intact, call button in reach, wedge under L side, RN notified, and family present.    GOALS:   Multidisciplinary Problems       Physical Therapy Goals          Problem: Physical Therapy    Goal Priority Disciplines Outcome Interventions   Physical Therapy Goal     PT, PT/OT Progressing    Description: Goals to be met by: 24     Patient will increase functional independence with mobility by performin. Supine to sit with Set-up [place]  2. Sit to supine with Set-up [place]  3. Sit to stand transfer with Stand-by Assistance  4. Gait  x 200 feet with Stand-by Assistance using Rolling Walker.   5. Ascend/descend 3 steps with Stand-by Assistance without using handrails                          Time Tracking:     PT Received On: 10/03/24  PT Start Time: 1428     PT Stop Time: 1452  PT Total Time (min): 24 min     Billable Minutes: Therapeutic Activity 2 units    Treatment Type: Treatment  PT/PTA: PTA     Number of PTA visits since last PT visit: 2     10/03/2024

## 2024-10-03 NOTE — SUBJECTIVE & OBJECTIVE
Interval History: Per nurse he has been sleeping more today, sleeping at time of rounds, able to arouse when calling his name but drifts off to sleep. Family had meeting yesterday and has decided on SNF, CM consulted     Review of Systems   Unable to perform ROS: Mental status change     Objective:     Vital Signs (Most Recent):  Temp: 99 °F (37.2 °C) (10/03/24 1555)  Pulse: 85 (10/03/24 1555)  Resp: 16 (10/03/24 1555)  BP: 125/67 (10/03/24 1555)  SpO2: 95 % (10/03/24 1159) Vital Signs (24h Range):  Temp:  [98.5 °F (36.9 °C)-99.5 °F (37.5 °C)] 99 °F (37.2 °C)  Pulse:  [] 85  Resp:  [16-20] 16  SpO2:  [93 %-96 %] 95 %  BP: (119-181)/(64-93) 125/67     Weight: 90 kg (198 lb 6.6 oz)  Body mass index is 29.3 kg/m².    Intake/Output Summary (Last 24 hours) at 10/3/2024 1829  Last data filed at 10/3/2024 0451  Gross per 24 hour   Intake --   Output 500 ml   Net -500 ml         Physical Exam  Constitutional:       General: He is sleeping.      Appearance: Normal appearance.   HENT:      Head: Normocephalic and atraumatic.   Cardiovascular:      Rate and Rhythm: Normal rate and regular rhythm.      Pulses: Normal pulses.   Pulmonary:      Effort: Pulmonary effort is normal.      Breath sounds: Normal breath sounds.   Abdominal:      General: Abdomen is flat. Bowel sounds are normal. There is no distension.      Palpations: Abdomen is soft.      Tenderness: There is no abdominal tenderness.   Musculoskeletal:         General: Normal range of motion.      Right lower leg: No edema.      Left lower leg: No edema.   Lymphadenopathy:      Cervical: No cervical adenopathy.   Neurological:      General: No focal deficit present.             Significant Labs: All pertinent labs within the past 24 hours have been reviewed.    Significant Imaging: I have reviewed all pertinent imaging results/findings within the past 24 hours.

## 2024-10-03 NOTE — CONSULTS
"RD consulted for "low appetite/decreased oral intake". RD following patient with current intervention of Boost Original BID (providing 240 kcal and 10 g protein per serving). Will modify ONS to Boost Plus TID (providing 360 kcal and 14 g protein per serving). Will continue to monitor.    Cielo Nazario, RD, LDN         "

## 2024-10-03 NOTE — PROGRESS NOTES
[facility] - [address] [facility] Medicine  Progress Note    Patient Name: Yusef Dodd  MRN: [id number]  Patient Class: IP- Inpatient   Admission Date: 9/28/2024  Length of Stay: 4 days  Attending Physician: Ayala Villagomez, *  Primary Care Provider: Ayala Villagomez MD        Subjective:     Principal Problem:<principal problem not specified>        HPI:  No notes on file    Overview/Hospital Course:  No notes on file    Interval History: Patient is more awake this morning,  able to answer some questions, oriented to person, knows his date of birth and his birthday is approaching. No nurse report available about last night.     Review of Systems   Unable to perform ROS: Mental status change     Objective:     Vital Signs (Most Recent):  Temp: 99 °F (37.2 °C) (10/03/24 1555)  Pulse: 85 (10/03/24 1555)  Resp: 16 (10/03/24 1555)  BP: 125/67 (10/03/24 1555)  SpO2: 95 % (10/03/24 1159) Vital Signs (24h Range):  Temp:  [98.3 °F (36.8 °C)-99.5 °F (37.5 °C)] 99 °F (37.2 °C)  Pulse:  [] 85  Resp:  [16-20] 16  SpO2:  [93 %-96 %] 95 %  BP: (119-181)/(64-93) 125/67     Weight: 90 kg (198 lb 6.6 oz)  Body mass index is 29.3 kg/m².    Intake/Output Summary (Last 24 hours) at 10/3/2024 1811  Last data filed at 10/3/2024 0451  Gross per 24 hour   Intake --   Output 500 ml   Net -500 ml         Physical Exam  Constitutional:       Appearance: Normal appearance.   HENT:      Head: Normocephalic and atraumatic.   Cardiovascular:      Rate and Rhythm: Normal rate and regular rhythm.      Pulses: Normal pulses.   Pulmonary:      Effort: Pulmonary effort is normal.      Breath sounds: Normal breath sounds.   Abdominal:      General: Abdomen is flat. Bowel sounds are normal. There is no distension.      Palpations: Abdomen is soft.      Tenderness: There is no abdominal tenderness.   Musculoskeletal:         General: No tenderness. Normal range of motion.      Right lower leg: No edema.      Left  lower leg: No edema.   Lymphadenopathy:      Cervical: No cervical adenopathy.   Neurological:      General: No focal deficit present.      Mental Status: He is alert and oriented to person, place, and time.             Significant Labs: All pertinent labs within the past 24 hours have been reviewed.    Significant Imaging: I have reviewed all pertinent imaging results/findings within the past 24 hours.    Assessment/Plan:      Altered mental status  Family is concerned about decline in mental status since admission  CT head negative  Likely hospital induced delirium  More awake today, continue Seroquel at night   Neurology consulted, no further recommendations          Gastrointestinal hemorrhage  Low GI bleed, presented with complaints of bleeding per rectum, FOBT positive on admission  GI consulted started on Protonix drip and taken for colonoscopy today with no acute findings  H/H have remained stable since admission  GI has signed off     Essential hypertension  Blood pressure has been controlled  Continue to monitor   Holding antihypertensives at the time     Stage 2 chronic kidney disease  Creatinine has been stable   Will continue IV fluids with normal saline       VTE Risk Mitigation (From admission, onward)           Ordered     IP VTE HIGH RISK PATIENT  Once         09/29/24 0412     Place sequential compression device  Until discontinued         09/29/24 0412                    Discharge Planning   BRENDA:      Code Status: Full Code   Is the patient medically ready for discharge?:     Reason for patient still in hospital (select all that apply): Treatment  Discharge Plan A: Skilled Nursing Facility                  Ayala Williamson MD  Department of Hospital Medicine   [facility] - [address] [facility]

## 2024-10-03 NOTE — ANESTHESIA POSTPROCEDURE EVALUATION
Anesthesia Post Evaluation    Patient: Yusef Dodd    Procedure(s) Performed: Procedure(s) (LRB):  COLON (N/A)    Final Anesthesia Type: general      Patient location during evaluation: PACU  Patient participation: Yes- Able to Participate  Level of consciousness: awake and alert  Post-procedure vital signs: reviewed and stable  Pain management: adequate  Airway patency: patent    PONV status at discharge: No PONV  Anesthetic complications: no      Respiratory status: unassisted  Hydration status: euvolemic  Follow-up not needed.              Vitals Value Taken Time   /46 09/30/24 1553   Temp 36.1 °C (97 °F) 09/30/24 1451   Pulse 68 09/30/24 1553   Resp 15 09/30/24 1553   SpO2 100 % 09/30/24 1553         No case tracking events are documented in the log.      Pain/Oscar Score: No data recorded

## 2024-10-03 NOTE — SUBJECTIVE & OBJECTIVE
Interval History: Patient is more awake this morning,  able to answer some questions, oriented to person, knows his date of birth and his birthday is approaching. No nurse report available about last night.     Review of Systems   Unable to perform ROS: Mental status change     Objective:     Vital Signs (Most Recent):  Temp: 99 °F (37.2 °C) (10/03/24 1555)  Pulse: 85 (10/03/24 1555)  Resp: 16 (10/03/24 1555)  BP: 125/67 (10/03/24 1555)  SpO2: 95 % (10/03/24 1159) Vital Signs (24h Range):  Temp:  [98.3 °F (36.8 °C)-99.5 °F (37.5 °C)] 99 °F (37.2 °C)  Pulse:  [] 85  Resp:  [16-20] 16  SpO2:  [93 %-96 %] 95 %  BP: (119-181)/(64-93) 125/67     Weight: 90 kg (198 lb 6.6 oz)  Body mass index is 29.3 kg/m².    Intake/Output Summary (Last 24 hours) at 10/3/2024 1811  Last data filed at 10/3/2024 0451  Gross per 24 hour   Intake --   Output 500 ml   Net -500 ml         Physical Exam  Constitutional:       Appearance: Normal appearance.   HENT:      Head: Normocephalic and atraumatic.   Cardiovascular:      Rate and Rhythm: Normal rate and regular rhythm.      Pulses: Normal pulses.   Pulmonary:      Effort: Pulmonary effort is normal.      Breath sounds: Normal breath sounds.   Abdominal:      General: Abdomen is flat. Bowel sounds are normal. There is no distension.      Palpations: Abdomen is soft.      Tenderness: There is no abdominal tenderness.   Musculoskeletal:         General: No tenderness. Normal range of motion.      Right lower leg: No edema.      Left lower leg: No edema.   Lymphadenopathy:      Cervical: No cervical adenopathy.   Neurological:      General: No focal deficit present.      Mental Status: He is alert and oriented to person, place, and time.             Significant Labs: All pertinent labs within the past 24 hours have been reviewed.    Significant Imaging: I have reviewed all pertinent imaging results/findings within the past 24 hours.

## 2024-10-03 NOTE — PROGRESS NOTES
[facility] Medicine  Progress Note    Patient Name: Yusef Dodd  MRN: [id number]  Patient Class: IP- Inpatient   Admission Date: 9/28/2024  Length of Stay: 4 days  Attending Physician: Ayala Villagomez, *  Primary Care Provider: Ayala Villagomez MD        Subjective:     Principal Problem:<principal problem not specified>        HPI:  No notes on file    Overview/Hospital Course:  No notes on file    Interval History: Per nurse he has been sleeping more today, sleeping at time of rounds, able to arouse when calling his name but drifts off to sleep. Family had meeting yesterday and has decided on SNF, CM consulted     Review of Systems   Unable to perform ROS: Mental status change     Objective:     Vital Signs (Most Recent):  Temp: 99 °F (37.2 °C) (10/03/24 1555)  Pulse: 85 (10/03/24 1555)  Resp: 16 (10/03/24 1555)  BP: 125/67 (10/03/24 1555)  SpO2: 95 % (10/03/24 1159) Vital Signs (24h Range):  Temp:  [98.5 °F (36.9 °C)-99.5 °F (37.5 °C)] 99 °F (37.2 °C)  Pulse:  [] 85  Resp:  [16-20] 16  SpO2:  [93 %-96 %] 95 %  BP: (119-181)/(64-93) 125/67     Weight: 90 kg (198 lb 6.6 oz)  Body mass index is 29.3 kg/m².    Intake/Output Summary (Last 24 hours) at 10/3/2024 1829  Last data filed at 10/3/2024 0451  Gross per 24 hour   Intake --   Output 500 ml   Net -500 ml         Physical Exam  Constitutional:       General: He is sleeping.      Appearance: Normal appearance.   HENT:      Head: Normocephalic and atraumatic.   Cardiovascular:      Rate and Rhythm: Normal rate and regular rhythm.      Pulses: Normal pulses.   Pulmonary:      Effort: Pulmonary effort is normal.      Breath sounds: Normal breath sounds.   Abdominal:      General: Abdomen is flat. Bowel sounds are normal. There is no distension.      Palpations: Abdomen is soft.      Tenderness: There is no abdominal tenderness.   Musculoskeletal:         General: Normal range of motion.      Right lower  leg: No edema.      Left lower leg: No edema.   Lymphadenopathy:      Cervical: No cervical adenopathy.   Neurological:      General: No focal deficit present.             Significant Labs: All pertinent labs within the past 24 hours have been reviewed.    Significant Imaging: I have reviewed all pertinent imaging results/findings within the past 24 hours.    Assessment/Plan:      Altered mental status  Family is concerned about decline in mental status since admission  CT head negative  Likely hospital induced delirium  Today has been more lethargic, will decrease dose of Seroquel  UA obtained and negative  Blood cultures obtained, in process   Neurology consulted, no further recommendations          Gastrointestinal hemorrhage  Low GI bleed, presented with complaints of bleeding per rectum, FOBT positive on admission  GI consulted started on Protonix drip and taken for colonoscopy today with no acute findings  H/H have remained stable since admission  GI has signed off     Essential hypertension  Blood pressure has been controlled  Continue to monitor   Holding antihypertensives at the time     Stage 2 chronic kidney disease  Creatinine has been stable   Will continue IV fluids with normal saline       VTE Risk Mitigation (From admission, onward)           Ordered     IP VTE HIGH RISK PATIENT  Once         09/29/24 0412     Place sequential compression device  Until discontinued         09/29/24 0412                    Discharge Planning   BRENDA:      Code Status: Full Code   Is the patient medically ready for discharge?:     Reason for patient still in hospital (select all that apply): Treatment  Discharge Plan A: Skilled Nursing Facility                  Ayala Williamson MD  Department of Hospital Medicine   [facility] - [address] [facility]

## 2024-10-03 NOTE — ASSESSMENT & PLAN NOTE
Family is concerned about decline in mental status since admission  CT head negative  Likely hospital induced delirium  More awake today, continue Seroquel at night   Neurology consulted, no further recommendations

## 2024-10-04 LAB
ALBUMIN SERPL-MCNC: 2.6 G/DL (ref 3.4–4.8)
ALBUMIN/GLOB SERPL: 1.2 RATIO (ref 1.1–2)
ALP SERPL-CCNC: 75 UNIT/L (ref 40–150)
ALT SERPL-CCNC: 26 UNIT/L (ref 0–55)
ANION GAP SERPL CALC-SCNC: 6 MEQ/L
AST SERPL-CCNC: 46 UNIT/L (ref 5–34)
BASOPHILS # BLD AUTO: 0.02 X10(3)/MCL
BASOPHILS NFR BLD AUTO: 0.3 %
BILIRUB SERPL-MCNC: 0.7 MG/DL
BUN SERPL-MCNC: 17.8 MG/DL (ref 8.4–25.7)
CALCIUM SERPL-MCNC: 7.9 MG/DL (ref 8.8–10)
CHLORIDE SERPL-SCNC: 116 MMOL/L (ref 98–111)
CO2 SERPL-SCNC: 23 MMOL/L (ref 23–31)
CREAT SERPL-MCNC: 1 MG/DL (ref 0.73–1.18)
CREAT/UREA NIT SERPL: 18
EOSINOPHIL # BLD AUTO: 0.35 X10(3)/MCL (ref 0–0.9)
EOSINOPHIL NFR BLD AUTO: 5.6 %
ERYTHROCYTE [DISTWIDTH] IN BLOOD BY AUTOMATED COUNT: 14.6 % (ref 11.5–17)
GFR SERPLBLD CREATININE-BSD FMLA CKD-EPI: >60 ML/MIN/1.73/M2
GLOBULIN SER-MCNC: 2.2 GM/DL (ref 2.4–3.5)
GLUCOSE SERPL-MCNC: 85 MG/DL (ref 75–121)
HCT VFR BLD AUTO: 30.5 % (ref 42–52)
HGB BLD-MCNC: 9.7 G/DL (ref 14–18)
IMM GRANULOCYTES # BLD AUTO: 0.02 X10(3)/MCL (ref 0–0.04)
IMM GRANULOCYTES NFR BLD AUTO: 0.3 %
LYMPHOCYTES # BLD AUTO: 0.63 X10(3)/MCL (ref 0.6–4.6)
LYMPHOCYTES NFR BLD AUTO: 10.1 %
MCH RBC QN AUTO: 29.2 PG (ref 27–31)
MCHC RBC AUTO-ENTMCNC: 31.8 G/DL (ref 33–36)
MCV RBC AUTO: 91.9 FL (ref 80–94)
MONOCYTES # BLD AUTO: 0.56 X10(3)/MCL (ref 0.1–1.3)
MONOCYTES NFR BLD AUTO: 9 %
NEUTROPHILS # BLD AUTO: 4.66 X10(3)/MCL (ref 2.1–9.2)
NEUTROPHILS NFR BLD AUTO: 74.7 %
NRBC BLD AUTO-RTO: 0 %
PLATELET # BLD AUTO: 178 X10(3)/MCL (ref 130–400)
PMV BLD AUTO: 12.1 FL (ref 7.4–10.4)
POTASSIUM SERPL-SCNC: 3.5 MMOL/L (ref 3.5–5.1)
PROT SERPL-MCNC: 4.8 GM/DL (ref 5.8–7.6)
RBC # BLD AUTO: 3.32 X10(6)/MCL (ref 4.7–6.1)
SODIUM SERPL-SCNC: 145 MMOL/L (ref 136–145)
WBC # BLD AUTO: 6.24 X10(3)/MCL (ref 4.5–11.5)

## 2024-10-04 PROCEDURE — 36415 COLL VENOUS BLD VENIPUNCTURE: CPT | Performed by: STUDENT IN AN ORGANIZED HEALTH CARE EDUCATION/TRAINING PROGRAM

## 2024-10-04 PROCEDURE — 94640 AIRWAY INHALATION TREATMENT: CPT

## 2024-10-04 PROCEDURE — 21400001 HC TELEMETRY ROOM

## 2024-10-04 PROCEDURE — 99900031 HC PATIENT EDUCATION (STAT)

## 2024-10-04 PROCEDURE — 97110 THERAPEUTIC EXERCISES: CPT

## 2024-10-04 PROCEDURE — 80053 COMPREHEN METABOLIC PANEL: CPT | Performed by: STUDENT IN AN ORGANIZED HEALTH CARE EDUCATION/TRAINING PROGRAM

## 2024-10-04 PROCEDURE — 94761 N-INVAS EAR/PLS OXIMETRY MLT: CPT

## 2024-10-04 PROCEDURE — 25000003 PHARM REV CODE 250: Performed by: INTERNAL MEDICINE

## 2024-10-04 PROCEDURE — 25000003 PHARM REV CODE 250: Performed by: STUDENT IN AN ORGANIZED HEALTH CARE EDUCATION/TRAINING PROGRAM

## 2024-10-04 PROCEDURE — 97535 SELF CARE MNGMENT TRAINING: CPT

## 2024-10-04 PROCEDURE — 36410 VNPNXR 3YR/> PHY/QHP DX/THER: CPT

## 2024-10-04 PROCEDURE — 97530 THERAPEUTIC ACTIVITIES: CPT

## 2024-10-04 PROCEDURE — 94760 N-INVAS EAR/PLS OXIMETRY 1: CPT

## 2024-10-04 PROCEDURE — 63600175 PHARM REV CODE 636 W HCPCS: Performed by: INTERNAL MEDICINE

## 2024-10-04 PROCEDURE — C1751 CATH, INF, PER/CENT/MIDLINE: HCPCS

## 2024-10-04 PROCEDURE — 27000221 HC OXYGEN, UP TO 24 HOURS

## 2024-10-04 PROCEDURE — 76937 US GUIDE VASCULAR ACCESS: CPT

## 2024-10-04 PROCEDURE — 99900035 HC TECH TIME PER 15 MIN (STAT)

## 2024-10-04 PROCEDURE — 85025 COMPLETE CBC W/AUTO DIFF WBC: CPT | Performed by: STUDENT IN AN ORGANIZED HEALTH CARE EDUCATION/TRAINING PROGRAM

## 2024-10-04 PROCEDURE — 25000242 PHARM REV CODE 250 ALT 637 W/ HCPCS: Performed by: STUDENT IN AN ORGANIZED HEALTH CARE EDUCATION/TRAINING PROGRAM

## 2024-10-04 RX ORDER — SODIUM CHLORIDE 0.9 % (FLUSH) 0.9 %
10 SYRINGE (ML) INJECTION
Status: DISCONTINUED | OUTPATIENT
Start: 2024-10-04 | End: 2024-10-08 | Stop reason: HOSPADM

## 2024-10-04 RX ORDER — SODIUM CHLORIDE 0.9 % (FLUSH) 0.9 %
10 SYRINGE (ML) INJECTION EVERY 6 HOURS
Status: DISCONTINUED | OUTPATIENT
Start: 2024-10-05 | End: 2024-10-08 | Stop reason: HOSPADM

## 2024-10-04 RX ADMIN — SODIUM CHLORIDE, PRESERVATIVE FREE 10 ML: 5 INJECTION INTRAVENOUS at 11:10

## 2024-10-04 RX ADMIN — SODIUM CHLORIDE: 9 INJECTION, SOLUTION INTRAVENOUS at 08:10

## 2024-10-04 RX ADMIN — ALBUTEROL SULFATE 2.5 MG: 2.5 SOLUTION RESPIRATORY (INHALATION) at 04:10

## 2024-10-04 RX ADMIN — ALLOPURINOL 300 MG: 300 TABLET ORAL at 08:10

## 2024-10-04 RX ADMIN — ALBUTEROL SULFATE 2.5 MG: 2.5 SOLUTION RESPIRATORY (INHALATION) at 08:10

## 2024-10-04 RX ADMIN — Medication: at 08:10

## 2024-10-04 RX ADMIN — MICONAZOLE NITRATE: 20 POWDER TOPICAL at 08:10

## 2024-10-04 RX ADMIN — HALOPERIDOL LACTATE 5 MG: 5 INJECTION, SOLUTION INTRAMUSCULAR at 11:10

## 2024-10-04 RX ADMIN — MICONAZOLE NITRATE: 20 POWDER TOPICAL at 09:10

## 2024-10-04 RX ADMIN — QUETIAPINE FUMARATE 12.5 MG: 25 TABLET ORAL at 09:10

## 2024-10-04 RX ADMIN — Medication: at 09:10

## 2024-10-04 RX ADMIN — FINASTERIDE 5 MG: 5 TABLET, FILM COATED ORAL at 08:10

## 2024-10-04 RX ADMIN — METOPROLOL TARTRATE 5 MG: 1 INJECTION, SOLUTION INTRAVENOUS at 10:10

## 2024-10-04 NOTE — PT/OT/SLP PROGRESS
Occupational Therapy   Treatment    Name: Yusef Dodd  MRN: [id number]  Admitting Diagnosis:  <principal problem not specified>  4 Days Post-Op    Recommendations:     Recommended therapy intensity at discharge: Moderate Intensity Therapy   Discharge Equipment Recommendations:   (tbd)  Barriers to discharge:  None    Assessment:     Yusef Dodd is a 89 y.o. male with a medical diagnosis of The primary encounter diagnosis was GI bleeding. Diagnoses of Encephalopathy, unspecified type, Gastrointestinal hemorrhage, unspecified gastrointestinal hemorrhage type, Gastrointestinal hemorrhage with melena, and Altered mental status, unspecified altered mental status type were also pertinent to this visit.    He presents with much improved cognition and functional performance today, able to follow commands with min cues at times, very motivated to stay out of bed, able to ambulate to chair and back to bed x 2 reps today with min assist and cues with RW. Pt adamantly refusing to return to bed and ambulated back to chair and left up with friends in room who will notify nsg when they leave, nsg aware, RW ordered  and chair alarm on.      Performance deficits affecting function are weakness, impaired endurance, impaired self care skills, impaired functional mobility, impaired cognition, decreased safety awareness.     Rehab Prognosis:  Good; patient would benefit from acute skilled OT services to address these deficits and reach maximum level of function.       Plan:     Patient to be seen 5 x/week to address the above listed problems via self-care/home management, therapeutic activities, therapeutic exercises  Plan of Care Expires: 10/30/24  Plan of Care Reviewed with: patient, son, family    Subjective     Pain/Comfort:  Pain Rating 1: 0/10    Objective:     Communicated with: nsg and Pt prior to session.  Patient found supine with telemetry, peripheral IV, pressure relief boots, bed alarm, PureWick upon OT entry to  room.    General Precautions: Standard, fall    Orthopedic Precautions:   Braces:    Respiratory Status:   Vital Signs:      Occupational Performance:     Bed Mobility:    Sup to sit with mod assist and cues     Functional Mobility/Transfers:  Sit to stand from bed x 3 reps with min assist x 2  Functional Mobility: ambulated x 6 feet to chair x 2 and 2 ft to chair with min assist x 2 and RW, cues with RW use and transfer techniques/safety    Activities of Daily Living:  Socks total assist  Grooming setup assist  Feeding in chair with setup assist    Therapeutic Activities:  As above     Therapeutic Exercise:  BUE AROM exercises: shoulder flexion, cross body punches, abd/adduction    Patient Education:  Patient provided with verbal education education regarding OT role/goals/POC, fall prevention, and safety awareness.  Additional teaching is warranted.      Patient left up in chair with all lines intact, call button in reach, chair alarm on, nsg notified, and friends present.    GOALS:   Multidisciplinary Problems       Occupational Therapy Goals          Problem: Occupational Therapy    Goal Priority Disciplines Outcome Interventions   Occupational Therapy Goal     OT, PT/OT Progressing    Description: Goals to be met by: 10/30/24     Patient will increase functional independence with ADLs by performing:    UE Dressing with Modified [place] and Supervision.  LE Dressing with Modified [place] and Supervision.  Grooming while seated with Modified [place] and Supervision. Progress as appropriate  Toileting from bedside commode with Modified [place] and Supervision for hygiene and clothing management.   Toilet transfer to bedside commode with Modified [place] and Supervision. Progress as appropriate.                         Time Tracking:     OT Date of Treatment: 10/04/24  OT Start Time: 1031  OT Stop Time: 1126  OT Total Time (min): 55 min    Billable Minutes:Self Care/Home Management 25  min  Therapeutic Exercise 30 min    OT/BRISEYDA: OT     Number of BRISEYDA visits since last OT visit: 0    10/4/2024

## 2024-10-04 NOTE — PROGRESS NOTES
Inpatient Nutrition Assessment    Admit Date: 9/28/2024   Total duration of encounter: 6 days   Patient Age: 89 y.o.    Nutrition Recommendation/Prescription     Continue regular diet as tolerated  Continue Boost Plus TID (360 kcal, 14 g protein per serving)  Monitor labs, intake and weight    Communication of Recommendations:  EMR    Nutrition Assessment     Malnutrition Assessment/Nutrition-Focused Physical Exam    Malnutrition Context: acute illness or injury (10/04/24 1303)  Malnutrition Level: other (see comments) (unable to determine) (10/04/24 1303)  Energy Intake (Malnutrition): less than or equal to 50% for greater than or equal to 5 days (10/04/24 1303)  Weight Loss (Malnutrition): other (see comments) (does not meet criteria) (10/04/24 1303)  Subcutaneous Fat (Malnutrition): other (see comments) (unable to assess) (10/04/24 1303)           Muscle Mass (Malnutrition): other (see comments) (unable to assess) (10/04/24 1303)                                   A minimum of two characteristics is recommended for diagnosis of either severe or non-severe malnutrition.    Chart Review    Reason Seen: follow-up    Malnutrition Screening Tool Results   Have you recently lost weight without trying?: Unsure  Have you been eating poorly because of a decreased appetite?: No (unable to assess, patient is confused)   MST Score: 2   Diagnosis:  AMS  Gastrointestinal hemorrhage  HTN    Relevant Medical History: HTN, gout, high cholesterol, CKD     Scheduled Medications:  albuterol, 2.5 mg, Q4H  allopurinoL, 300 mg, Daily  finasteride, 5 mg, Daily  miconazole NITRATE 2 %, , BID  QUEtiapine, 12.5 mg, Nightly  zinc oxide-cod liver oil, , BID    Continuous Infusions:  0.9% NaCl, Last Rate: 100 mL/hr at 10/04/24 0854    PRN Medications:  acetaminophen, 650 mg, Q4H PRN  haloperidol lactate, 5 mg, Q6H PRN  hydrALAZINE, 10 mg, Q4H PRN  metoprolol, 5 mg, Q4H PRN  sodium chloride 0.9%, 10 mL, PRN    Calorie Containing IV Medications:  no significant kcals from medications at this time    Recent Labs   Lab 09/28/24  1847 09/29/24  0704 09/30/24  0021 09/30/24  0552 10/01/24  1830 10/02/24  0357 10/03/24  0450 10/04/24  0611     --   --  150* 149* 148* 147* 145   K 4.2  --   --  3.9 3.2* 3.5 3.6 3.5   CALCIUM 9.0  --   --  8.9 8.3* 8.4* 8.2* 7.9*     --   --  116* 119* 117* 119* 116*   CO2 24  --   --  24 22* 22* 23 23   BUN 29.9*  --   --  21.1 19.9 17.5 16.5 17.8   CREATININE 1.48*  --   --  1.30* 1.24* 1.18 1.19* 1.00   EGFRNORACEVR 45  --   --  52 55 59 58 >60   GLUCOSE 105  --   --  111 121 98 87 85   BILITOT 0.6  --   --   --  0.5 0.6 0.7 0.7   ALKPHOS 84  --   --   --  70 76 70 75   ALT 13  --   --   --  19 22 22 26   AST 23  --   --   --  50* 53* 47* 46*   ALBUMIN 3.4  --   --   --  2.8* 3.0* 2.7* 2.6*   WBC 5.55  --  7.09  --  7.03 7.51 7.07 6.24   HGB 10.5* 10.6* 10.7*  --  9.3* 9.6* 9.2* 9.7*   HCT 34.0* 33.3* 34.7*  --  29.4* 31.0* 29.7* 30.5*     Nutrition Orders:  Diet Adult Regular  Dietary nutrition supplements All Meals; Boost Plus Nutritional Drink - Any flavor    Appetite/Oral Intake: poor/25-50% of meals  Factors Affecting Nutritional Intake: impaired cognitive status/motor control and decreased appetite  Social Needs Impacting Access to Food: none identified  Food/[place]/Cultural Preferences: none reported  Food Allergies: no known food allergies  Last Bowel Movement: 09/29/24  Wound(s):     Wound 09/29/24 0532 Moisture associated dermatitis midline Perineum-Tissue loss description: Not applicable     Comments    9/30/24: Pt asleep with mittens on, awaiting colonoscopy. Family members report good intake PTA, no c/s issues reported. Intake yesterday was poor prior to NPO status. No n/v/d/c or abdominal pain per notes. Last BM 9/29 noted. Family agreeable to trial ONS upon diet advancement to ensure adequate nutrition. Estimated admit weight of 198# noted. Unable to obtain weight history at this time. Per EMR  "weight history, UBW ~176# with previous weight of 172# on 24; weight appears stable. Will attempt NFPE upon follow up as appropriate.     10/4/24: Pt working with PT at time of visit. Pt with recent AMS and drowsiness per notes. Reports of patient being more alert today. Intake has been poor since admission d/t complications. Will continue current regimen and monitor need for modifications.     Anthropometrics    Height: 5' 9" (175.3 cm), Height Method: Estimated  Last Weight: 90 kg (198 lb 6.6 oz) (24), Weight Method: Estimated  BMI (Calculated): 29.3  BMI Classification: overweight (BMI 25-29.9)        Ideal Body Weight (IBW), Male: 160 lb     % Ideal Body Weight, Male (lb): 124.01 %                 Usual Body Weight (UBW), k kg  % Usual Body Weight: 112.74     Usual Weight Provided By: EMR weight history    Wt Readings from Last 5 Encounters:   24 90 kg (198 lb 6.6 oz)   24 90.7 kg (200 lb)   24 74.8 kg (165 lb)   24 77.1 kg (170 lb)   24 80.7 kg (178 lb)     Weight of 172# on  noted per EMR weight history; weight appears stable     Weight Change(s) Since Admission:   Wt Readings from Last 1 Encounters:   24 90 kg (198 lb 6.6 oz)   Admit Weight: 90 kg (198 lb 6.6 oz) (24), Weight Method: Estimated    Estimated Needs    Weight Used For Calorie Calculations: 80 kg (176 lb 5.9 oz) (recent weight in EMR used)  Energy Calorie Requirements (kcal): 0512-0002 kcal (1.1-1.2 SF)  [facility]  Weight Used For Protein Calculations: 80 kg (176 lb 5.9 oz) (recent weight in EMR used)  Protein Requirements: 64-80 gm (0.8-1.0 gm/kg)  Fluid Requirements (mL): 8794-9374 mL (1 mL/kcal)        Enteral Nutrition     Patient not receiving enteral nutrition at this time.    Parenteral Nutrition     Patient not receiving parenteral nutrition support at this time.    Evaluation of Received Nutrient Intake    Calories: not meeting estimated " needs  Protein: not meeting estimated needs    Patient Education     Not applicable.    Nutrition Diagnosis     PES: Inadequate oral intake related to acute illness as evidenced by <50% intake for >5 days. (new)     Nutrition Interventions     Intervention(s): general/healthful diet, commercial beverage, and collaboration with other providers    Goal: Meet greater than 80% of nutritional needs by follow-up. (new)  Goal: Consume % of oral supplements by follow-up. (new)    Nutrition Goals & Monitoring     Dietitian will monitor: food and beverage intake, weight, electrolyte/renal panel, glucose/endocrine profile, and gastrointestinal profile  Discharge planning: continue regular diet  Nutrition Risk/Follow-Up: moderate (follow-up in 3-5 days)   Please consult if re-assessment needed sooner.

## 2024-10-04 NOTE — PLAN OF CARE
[facility] sent clinical updates to [facility] via [employer].I talked to their Admissions coordinator she stated the family said he's doing better but their doctor wanted to wait until Monday.

## 2024-10-04 NOTE — PT/OT/SLP PROGRESS
"Physical Therapy Treatment    Patient Name:  Yusef Dodd   MRN:  [id number]    Recommendations:     Discharge therapy intensity: Moderate Intensity Therapy   Discharge Equipment Recommendations: to be determined by next level of care  Barriers to discharge: Impaired mobility and Ongoing medical needs    Assessment:     Yusef Dodd is a 89 y.o. male admitted with a medical diagnosis of GI hemorrhage, encephalopathy.  He presents with the following impairments/functional limitations: weakness, gait instability, impaired balance, impaired endurance, decreased safety awareness, impaired self care skills, impaired functional mobility.    Pt Max A x2 for supine > sit. Min A STS to RW, transferred to recliner w/ Min A. Transferred back to sitting EOB, then pt adamantly refusing to lie down despite persuasion efforts from visitors in room. Discussed with nurse, agreed to let pt sit up in recliner with chair alarm, so pt again transferred back to recliner in room.     Rehab Prognosis: Good; patient would benefit from acute skilled PT services to address these deficits and reach maximum level of function.    Recent Surgery: Procedure(s) (LRB):  COLON (N/A) 4 Days Post-Op    Plan:     During this hospitalization, patient would benefit from acute PT services 5 x/week to address the identified rehab impairments via gait training, therapeutic activities, therapeutic exercises and progress toward the following goals:    Plan of Care Expires:  11/01/24    Subjective     Chief Complaint: "I'm not getting back in bed"  Patient/Family Comments/goals: "I'm ready to go home"  Pain/Comfort:  Pain Rating 1: 0/10      Objective:     Communicated with nurse prior to session.  Patient found HOB elevated with telemetry, peripheral IV, pressure relief boots upon PT entry to room.     General Precautions: Standard, fall  Orthopedic Precautions: N/A  Braces: N/A  Respiratory Status: Room air  Blood Pressure: NT  Skin Integrity: Visible skin " intact      Functional Mobility:  Bed Mobility:     Supine to Sit: Max A and of 2 persons  Transfers:     Sit to Stand:  Min A with rolling walker, assist to grab handles upon standing  Bed to Chair: Min A with  rolling walker  using  Step Transfer  Gait: Sidesteps at bedside ~3 ft w/ RW Min A for stability and managing AD. Cueing for upright posture    Therapeutic Activities/Exercises:  Seated marching, LAQ, UE reaches - SBA seated balance  Standing marching at RW - CGA/Min A for balance  Pt needing assist to grab handles on walker upon standing  Constant cueing to perform tasks as pt fairly confused       Patient left up in chair with all lines intact, call button in reach, chair alarm on, nurse notified, and visitors present    GOALS:   Multidisciplinary Problems       Physical Therapy Goals          Problem: Physical Therapy    Goal Priority Disciplines Outcome Interventions   Physical Therapy Goal     PT, PT/OT Progressing    Description: Goals to be met by: 24     Patient will increase functional independence with mobility by performin. Supine to sit with Set-up Lynn  2. Sit to supine with Set-up Lynn  3. Sit to stand transfer with Stand-by Assistance  4. Gait  x 200 feet with Stand-by Assistance using Rolling Walker.   5. Ascend/descend 3 steps with Stand-by Assistance without using handrails                          Time Tracking:     PT Received On: 10/04/24  PT Start Time: 1032     PT Stop Time: 1127  PT Total Time (min): 55 min     Billable Minutes: Therapeutic Activity 45 and Therapeutic Exercise 10    Treatment Type: Treatment  PT/PTA: PT     Number of PTA visits since last PT visit: 3     10/04/2024

## 2024-10-05 LAB
ALBUMIN SERPL-MCNC: 2.7 G/DL (ref 3.4–4.8)
ALBUMIN/GLOB SERPL: 1.2 RATIO (ref 1.1–2)
ALP SERPL-CCNC: 76 UNIT/L (ref 40–150)
ALT SERPL-CCNC: 31 UNIT/L (ref 0–55)
ANION GAP SERPL CALC-SCNC: 8 MEQ/L
AST SERPL-CCNC: 49 UNIT/L (ref 5–34)
BASOPHILS # BLD AUTO: 0.02 X10(3)/MCL
BASOPHILS NFR BLD AUTO: 0.4 %
BILIRUB SERPL-MCNC: 0.6 MG/DL
BUN SERPL-MCNC: 14 MG/DL (ref 8.4–25.7)
CALCIUM SERPL-MCNC: 8.2 MG/DL (ref 8.8–10)
CHLORIDE SERPL-SCNC: 111 MMOL/L (ref 98–111)
CO2 SERPL-SCNC: 24 MMOL/L (ref 23–31)
CREAT SERPL-MCNC: 0.95 MG/DL (ref 0.73–1.18)
CREAT/UREA NIT SERPL: 15
EOSINOPHIL # BLD AUTO: 0.32 X10(3)/MCL (ref 0–0.9)
EOSINOPHIL NFR BLD AUTO: 5.8 %
ERYTHROCYTE [DISTWIDTH] IN BLOOD BY AUTOMATED COUNT: 14.5 % (ref 11.5–17)
GFR SERPLBLD CREATININE-BSD FMLA CKD-EPI: >60 ML/MIN/1.73/M2
GLOBULIN SER-MCNC: 2.2 GM/DL (ref 2.4–3.5)
GLUCOSE SERPL-MCNC: 112 MG/DL (ref 75–121)
HCT VFR BLD AUTO: 31.5 % (ref 42–52)
HGB BLD-MCNC: 9.8 G/DL (ref 14–18)
IMM GRANULOCYTES # BLD AUTO: 0.02 X10(3)/MCL (ref 0–0.04)
IMM GRANULOCYTES NFR BLD AUTO: 0.4 %
LYMPHOCYTES # BLD AUTO: 0.82 X10(3)/MCL (ref 0.6–4.6)
LYMPHOCYTES NFR BLD AUTO: 14.8 %
MCH RBC QN AUTO: 29.1 PG (ref 27–31)
MCHC RBC AUTO-ENTMCNC: 31.1 G/DL (ref 33–36)
MCV RBC AUTO: 93.5 FL (ref 80–94)
MONOCYTES # BLD AUTO: 0.55 X10(3)/MCL (ref 0.1–1.3)
MONOCYTES NFR BLD AUTO: 9.9 %
NEUTROPHILS # BLD AUTO: 3.8 X10(3)/MCL (ref 2.1–9.2)
NEUTROPHILS NFR BLD AUTO: 68.7 %
NRBC BLD AUTO-RTO: 0 %
PLATELET # BLD AUTO: 235 X10(3)/MCL (ref 130–400)
PMV BLD AUTO: 11.4 FL (ref 7.4–10.4)
POTASSIUM SERPL-SCNC: 3.7 MMOL/L (ref 3.5–5.1)
PROT SERPL-MCNC: 4.9 GM/DL (ref 5.8–7.6)
RBC # BLD AUTO: 3.37 X10(6)/MCL (ref 4.7–6.1)
SODIUM SERPL-SCNC: 143 MMOL/L (ref 136–145)
TSH SERPL-ACNC: 3.96 UIU/ML (ref 0.35–4.94)
WBC # BLD AUTO: 5.53 X10(3)/MCL (ref 4.5–11.5)

## 2024-10-05 PROCEDURE — 25000003 PHARM REV CODE 250: Performed by: STUDENT IN AN ORGANIZED HEALTH CARE EDUCATION/TRAINING PROGRAM

## 2024-10-05 PROCEDURE — 85025 COMPLETE CBC W/AUTO DIFF WBC: CPT | Performed by: INTERNAL MEDICINE

## 2024-10-05 PROCEDURE — 84443 ASSAY THYROID STIM HORMONE: CPT | Performed by: INTERNAL MEDICINE

## 2024-10-05 PROCEDURE — 63600175 PHARM REV CODE 636 W HCPCS: Performed by: INTERNAL MEDICINE

## 2024-10-05 PROCEDURE — 99900035 HC TECH TIME PER 15 MIN (STAT)

## 2024-10-05 PROCEDURE — 36415 COLL VENOUS BLD VENIPUNCTURE: CPT | Performed by: INTERNAL MEDICINE

## 2024-10-05 PROCEDURE — 21400001 HC TELEMETRY ROOM

## 2024-10-05 PROCEDURE — 99233 SBSQ HOSP IP/OBS HIGH 50: CPT | Mod: ,,, | Performed by: INTERNAL MEDICINE

## 2024-10-05 PROCEDURE — 94760 N-INVAS EAR/PLS OXIMETRY 1: CPT

## 2024-10-05 PROCEDURE — 99233 SBSQ HOSP IP/OBS HIGH 50: CPT | Mod: ,,, | Performed by: STUDENT IN AN ORGANIZED HEALTH CARE EDUCATION/TRAINING PROGRAM

## 2024-10-05 PROCEDURE — 25000242 PHARM REV CODE 250 ALT 637 W/ HCPCS: Performed by: STUDENT IN AN ORGANIZED HEALTH CARE EDUCATION/TRAINING PROGRAM

## 2024-10-05 PROCEDURE — A4216 STERILE WATER/SALINE, 10 ML: HCPCS | Performed by: INTERNAL MEDICINE

## 2024-10-05 PROCEDURE — 94640 AIRWAY INHALATION TREATMENT: CPT

## 2024-10-05 PROCEDURE — 25000003 PHARM REV CODE 250: Performed by: INTERNAL MEDICINE

## 2024-10-05 PROCEDURE — 80053 COMPREHEN METABOLIC PANEL: CPT | Performed by: INTERNAL MEDICINE

## 2024-10-05 PROCEDURE — 99900031 HC PATIENT EDUCATION (STAT)

## 2024-10-05 RX ORDER — METOPROLOL TARTRATE 25 MG/1
25 TABLET, FILM COATED ORAL 2 TIMES DAILY
Status: DISCONTINUED | OUTPATIENT
Start: 2024-10-05 | End: 2024-10-08 | Stop reason: HOSPADM

## 2024-10-05 RX ORDER — METOPROLOL TARTRATE 1 MG/ML
5 INJECTION, SOLUTION INTRAVENOUS
Status: DISCONTINUED | OUTPATIENT
Start: 2024-10-05 | End: 2024-10-08 | Stop reason: HOSPADM

## 2024-10-05 RX ADMIN — HYDRALAZINE HYDROCHLORIDE 10 MG: 20 INJECTION INTRAMUSCULAR; INTRAVENOUS at 12:10

## 2024-10-05 RX ADMIN — SODIUM CHLORIDE, PRESERVATIVE FREE 10 ML: 5 INJECTION INTRAVENOUS at 05:10

## 2024-10-05 RX ADMIN — METOPROLOL TARTRATE 25 MG: 25 TABLET, FILM COATED ORAL at 08:10

## 2024-10-05 RX ADMIN — ALBUTEROL SULFATE 2.5 MG: 2.5 SOLUTION RESPIRATORY (INHALATION) at 03:10

## 2024-10-05 RX ADMIN — QUETIAPINE FUMARATE 12.5 MG: 25 TABLET ORAL at 08:10

## 2024-10-05 RX ADMIN — FINASTERIDE 5 MG: 5 TABLET, FILM COATED ORAL at 09:10

## 2024-10-05 RX ADMIN — MICONAZOLE NITRATE: 20 POWDER TOPICAL at 09:10

## 2024-10-05 RX ADMIN — ACETAMINOPHEN 650 MG: 325 TABLET, FILM COATED ORAL at 03:10

## 2024-10-05 RX ADMIN — ALLOPURINOL 300 MG: 300 TABLET ORAL at 09:10

## 2024-10-05 RX ADMIN — Medication: at 08:10

## 2024-10-05 RX ADMIN — ALBUTEROL SULFATE 2.5 MG: 2.5 SOLUTION RESPIRATORY (INHALATION) at 07:10

## 2024-10-05 RX ADMIN — METOPROLOL TARTRATE 5 MG: 1 INJECTION, SOLUTION INTRAVENOUS at 05:10

## 2024-10-05 RX ADMIN — ALBUTEROL SULFATE 2.5 MG: 2.5 SOLUTION RESPIRATORY (INHALATION) at 04:10

## 2024-10-05 RX ADMIN — MICONAZOLE NITRATE: 20 POWDER TOPICAL at 08:10

## 2024-10-05 RX ADMIN — METOPROLOL TARTRATE 5 MG: 1 INJECTION, SOLUTION INTRAVENOUS at 03:10

## 2024-10-05 RX ADMIN — Medication: at 09:10

## 2024-10-05 NOTE — ASSESSMENT & PLAN NOTE
Low GI bleed, presented with complaints of bleeding per rectum, FOBT positive on admission  GI consulted taken for colonoscopy 09/30 with no acute findings  H/H have remained stable since admission  GI has signed off

## 2024-10-05 NOTE — PROGRESS NOTES
[facility] - [address] [facility] Medicine  Progress Note    Patient Name: Yusef Dodd  MRN: [id number]  Patient Class: IP- Inpatient   Admission Date: 9/28/2024  Length of Stay: 6 days  Attending Physician: Ayala Villagomez, *  Primary Care Provider: Ayala Villagomez MD        Subjective:     Principal Problem:<principal problem not specified>        HPI:  No notes on file    Overview/Hospital Course:  No notes on file    Interval History: Patient is sitting in bed more alert and awake this morning, slept all night.     Review of Systems   Unable to perform ROS: Mental status change     Objective:     Vital Signs (Most Recent):  Temp: 97.7 °F (36.5 °C) (10/05/24 0301)  Pulse: (!) 116 (10/05/24 0301)  Resp: 16 (10/05/24 0301)  BP: (!) 142/70 (10/05/24 0304)  SpO2: 98 % (10/05/24 0301) Vital Signs (24h Range):  Temp:  [97.7 °F (36.5 °C)-99.1 °F (37.3 °C)] 97.7 °F (36.5 °C)  Pulse:  [] 116  Resp:  [16-20] 16  SpO2:  [88 %-100 %] 98 %  BP: (109-174)/(59-91) 142/70     Weight: 90 kg (198 lb 6.6 oz)  Body mass index is 29.3 kg/m².    Intake/Output Summary (Last 24 hours) at 10/5/2024 0403  Last data filed at 10/4/2024 1638  Gross per 24 hour   Intake 240 ml   Output --   Net 240 ml         Physical Exam  Constitutional:       Appearance: Normal appearance.   HENT:      Head: Normocephalic and atraumatic.   Cardiovascular:      Rate and Rhythm: Normal rate and regular rhythm.      Pulses: Normal pulses.   Pulmonary:      Effort: Pulmonary effort is normal.      Breath sounds: Normal breath sounds.   Abdominal:      General: Abdomen is flat. Bowel sounds are normal. There is no distension.      Palpations: Abdomen is soft.      Tenderness: There is no abdominal tenderness.   Musculoskeletal:         General: Normal range of motion.      Right lower leg: No edema.      Left lower leg: No edema.   Neurological:      General: No focal deficit present.      Mental Status: He is alert and  oriented to person, place, and time.             Significant Labs: All pertinent labs within the past 24 hours have been reviewed.    Significant Imaging: I have reviewed all pertinent imaging results/findings within the past 24 hours.    Assessment/Plan:      Altered mental status  Patient has been more alert and awake this morning, able to sit up in chair   CT head negative  Likely hospital induced delirium  Seroquel held last night due to patient able to sleep all night   Blood cultures obtained, negative to date  Urinalysis negative   Neurology consulted, no further recommendations          Gastrointestinal hemorrhage  Low GI bleed, presented with complaints of bleeding per rectum, FOBT positive on admission  GI consulted taken for colonoscopy 09/30 with no acute findings  H/H have remained stable since admission  GI has signed off     Essential hypertension  Blood pressure has been controlled  Continue to monitor   Holding antihypertensives at the time     Stage 2 chronic kidney disease  Creatinine has been stable   Will continue IV fluids with normal saline       VTE Risk Mitigation (From admission, onward)           Ordered     IP VTE HIGH RISK PATIENT  Once         09/29/24 0412     Place sequential compression device  Until discontinued         09/29/24 0412                    Discharge Planning   BRENDA:      Code Status: Full Code   Is the patient medically ready for discharge?:     Reason for patient still in hospital (select all that apply): Treatment  Discharge Plan A: Skilled Nursing Facility                  Ayala Williamson MD  Department of Hospital Medicine   [facility] - [facility]

## 2024-10-05 NOTE — PROCEDURES
"Yusef Dodd is a 89 y.o. male patient.    Temp: 99.1 °F (37.3 °C) (10/04/24 1927)  Pulse: (!) 120 (10/04/24 1927)  Resp: 18 (10/04/24 1609)  BP: (!) 173/74 (10/04/24 1927)  SpO2: (!) 88 % (10/04/24 1927)  Weight: 90 kg (198 lb 6.6 oz) (09/28/24 2236)  Height: 5' 9" (175.3 cm) (09/28/24 2236)    PICC  Date/Time: 10/4/2024 8:02 PM  Performed by: Sonido Landeros RN  Consent Done: Yes  Time out: Immediately prior to procedure a time out was called to verify the correct patient, procedure, equipment, support staff and site/side marked as required  Indications: med administration and vascular access  Anesthesia: local infiltration  Local anesthetic: lidocaine 1% without epinephrine  Anesthetic Total (mL): 5  Preparation: skin prepped with ChloraPrep  Skin prep agent dried: skin prep agent completely dried prior to procedure  Sterile barriers: all five maximum sterile barriers used - cap, mask, sterile gown, sterile gloves, and large sterile sheet  Hand hygiene: hand hygiene performed prior to central venous catheter insertion  Location details: left brachial  Catheter type: single lumen  Catheter size: 4 Fr  Catheter Length: 15cm    Ultrasound guidance: yes  Vessel Caliber: medium and patent, compressibility normal  Vascular Doppler: not done  Needle advanced into vessel with real time Ultrasound guidance.  Guidewire confirmed in vessel.  Sterile sheath used.  Number of attempts: 1  Post-procedure: blood return through all ports, chlorhexidine patch and sterile dressing applied            Name Sonido Landeros RN  10/4/2024    "

## 2024-10-05 NOTE — SUBJECTIVE & OBJECTIVE
Interval History: Patient is sitting in bed more alert and awake this morning, slept all night.     Review of Systems   Unable to perform ROS: Mental status change     Objective:     Vital Signs (Most Recent):  Temp: 97.7 °F (36.5 °C) (10/05/24 0301)  Pulse: (!) 116 (10/05/24 0301)  Resp: 16 (10/05/24 0301)  BP: (!) 142/70 (10/05/24 0304)  SpO2: 98 % (10/05/24 0301) Vital Signs (24h Range):  Temp:  [97.7 °F (36.5 °C)-99.1 °F (37.3 °C)] 97.7 °F (36.5 °C)  Pulse:  [] 116  Resp:  [16-20] 16  SpO2:  [88 %-100 %] 98 %  BP: (109-174)/(59-91) 142/70     Weight: 90 kg (198 lb 6.6 oz)  Body mass index is 29.3 kg/m².    Intake/Output Summary (Last 24 hours) at 10/5/2024 0403  Last data filed at 10/4/2024 1638  Gross per 24 hour   Intake 240 ml   Output --   Net 240 ml         Physical Exam  Constitutional:       Appearance: Normal appearance.   HENT:      Head: Normocephalic and atraumatic.   Cardiovascular:      Rate and Rhythm: Normal rate and regular rhythm.      Pulses: Normal pulses.   Pulmonary:      Effort: Pulmonary effort is normal.      Breath sounds: Normal breath sounds.   Abdominal:      General: Abdomen is flat. Bowel sounds are normal. There is no distension.      Palpations: Abdomen is soft.      Tenderness: There is no abdominal tenderness.   Musculoskeletal:         General: Normal range of motion.      Right lower leg: No edema.      Left lower leg: No edema.   Neurological:      General: No focal deficit present.      Mental Status: He is alert and oriented to person, place, and time.             Significant Labs: All pertinent labs within the past 24 hours have been reviewed.    Significant Imaging: I have reviewed all pertinent imaging results/findings within the past 24 hours.

## 2024-10-05 NOTE — SUBJECTIVE & OBJECTIVE
Interval History: Family at bedside.  They are concerned because he seems more confused today.  It seems his mental status has been waxing and waning.  He doesn't really have any complaints.  But he is very difficult to understand.  Appears comfortable.    Review of Systems  Objective:     Vital Signs (Most Recent):  Temp: 97.9 °F (36.6 °C) (10/05/24 1610)  Pulse: 96 (10/05/24 1610)  Resp: (!) 23 (10/05/24 1513)  BP: (!) 146/66 (10/05/24 1610)  SpO2: (!) 93 % (10/05/24 1610) Vital Signs (24h Range):  Temp:  [97.7 °F (36.5 °C)-99.1 °F (37.3 °C)] 97.9 °F (36.6 °C)  Pulse:  [] 96  Resp:  [16-23] 23  SpO2:  [88 %-98 %] 93 %  BP: (114-174)/(59-83) 146/66     Weight: 90 kg (198 lb 6.6 oz)  Body mass index is 29.3 kg/m².  No intake or output data in the 24 hours ending 10/05/24 1640      Physical Exam  Constitutional:       General: He is not in acute distress.     Appearance: He is not ill-appearing, toxic-appearing or diaphoretic.   HENT:      Head: Normocephalic and atraumatic.   Cardiovascular:      Rate and Rhythm: Tachycardia present. Rhythm irregular.      Heart sounds: Normal heart sounds.   Pulmonary:      Effort: Pulmonary effort is normal.      Breath sounds: Normal breath sounds.   Abdominal:      Palpations: Abdomen is soft.   Musculoskeletal:      Right lower leg: No edema.      Left lower leg: No edema.   Skin:     General: Skin is warm and dry.   Neurological:      Mental Status: He is alert.      Motor: No weakness.      Comments: Speech not very intelligible.             Significant Labs: All pertinent labs within the past 24 hours have been reviewed.    Significant Imaging: I have reviewed all pertinent imaging results/findings within the past 24 hours.

## 2024-10-05 NOTE — PROGRESS NOTES
[facility] Medicine  Progress Note    Patient Name: Yusef Dodd  MRN: [id number]  Patient Class: IP- Inpatient   Admission Date: 9/28/2024  Length of Stay: 6 days  Attending Physician: Ayala Villagomez, *  Primary Care Provider: Ayala Villagomez MD        Subjective:     Principal Problem:<principal problem not specified>        HPI:  No notes on file    Overview/Hospital Course:  No notes on file    Interval History: Family at bedside.  They are concerned because he seems more confused today.  It seems his mental status has been waxing and waning.  He doesn't really have any complaints.  But he is very difficult to understand.  Appears comfortable.    Review of Systems  Objective:     Vital Signs (Most Recent):  Temp: 97.9 °F (36.6 °C) (10/05/24 1610)  Pulse: 96 (10/05/24 1610)  Resp: (!) 23 (10/05/24 1513)  BP: (!) 146/66 (10/05/24 1610)  SpO2: (!) 93 % (10/05/24 1610) Vital Signs (24h Range):  Temp:  [97.7 °F (36.5 °C)-99.1 °F (37.3 °C)] 97.9 °F (36.6 °C)  Pulse:  [] 96  Resp:  [16-23] 23  SpO2:  [88 %-98 %] 93 %  BP: (114-174)/(59-83) 146/66     Weight: 90 kg (198 lb 6.6 oz)  Body mass index is 29.3 kg/m².  No intake or output data in the 24 hours ending 10/05/24 1640      Physical Exam  Constitutional:       General: He is not in acute distress.     Appearance: He is not ill-appearing, toxic-appearing or diaphoretic.   HENT:      Head: Normocephalic and atraumatic.   Cardiovascular:      Rate and Rhythm: Tachycardia present. Rhythm irregular.      Heart sounds: Normal heart sounds.   Pulmonary:      Effort: Pulmonary effort is normal.      Breath sounds: Normal breath sounds.   Abdominal:      Palpations: Abdomen is soft.   Musculoskeletal:      Right lower leg: No edema.      Left lower leg: No edema.   Skin:     General: Skin is warm and dry.   Neurological:      Mental Status: He is alert.      Motor: No weakness.      Comments: Speech not very  intelligible.             Significant Labs: All pertinent labs within the past 24 hours have been reviewed.    Significant Imaging: I have reviewed all pertinent imaging results/findings within the past 24 hours.    Assessment/Plan:      Altered mental status  Mental status still waxing and waning.  Will repeat CT head given aphasia.  He has seroquel ordered for bedtime.  Repeat labs.          Gastrointestinal hemorrhage  Low GI bleed, presented with complaints of bleeding per rectum, FOBT positive on admission  GI consulted taken for colonoscopy 09/30 with no acute findings  H/H have remained stable since admission  GI has signed off     Chronic kidney disease, stage 3a with mild GONZALO  Creatinine has normalized with iv fluids.    Essential hypertension  Blood pressure has been controlled  Continue to monitor   Holding antihypertensives at the time   Tele shows frequent ectopy.  Will add a low dose of metoprolol.          VTE Risk Mitigation (From admission, onward)           Ordered     IP VTE HIGH RISK PATIENT  Once         09/29/24 0412     Place sequential compression device  Until discontinued         09/29/24 0412                    Discharge Planning   BRENDA:      Code Status: Full Code   Is the patient medically ready for discharge?:     Reason for patient still in hospital (select all that apply): Patient trending condition  Discharge Plan A: Skilled Nursing Facility                  Swetha Gutierrez MD  Department of Hospital Medicine   [facility] - [facility]

## 2024-10-05 NOTE — ASSESSMENT & PLAN NOTE
Patient has been more alert and awake this morning, able to sit up in chair   CT head negative  Likely hospital induced delirium  Seroquel held last night due to patient able to sleep all night   Blood cultures obtained, negative to date  Urinalysis negative   Neurology consulted, no further recommendations

## 2024-10-05 NOTE — ASSESSMENT & PLAN NOTE
Mental status still waxing and waning.  Will repeat CT head given aphasia.  He has seroquel ordered for bedtime.  Repeat labs.

## 2024-10-06 DIAGNOSIS — E78.5 HYPERLIPIDEMIA, UNSPECIFIED HYPERLIPIDEMIA TYPE: ICD-10-CM

## 2024-10-06 PROBLEM — R13.10 DYSPHAGIA: Status: ACTIVE | Noted: 2024-10-06

## 2024-10-06 LAB
ANION GAP SERPL CALC-SCNC: 8 MEQ/L
BACTERIA #/AREA URNS AUTO: ABNORMAL /HPF
BASOPHILS # BLD AUTO: 0.03 X10(3)/MCL
BASOPHILS NFR BLD AUTO: 0.5 %
BILIRUB UR QL STRIP.AUTO: NEGATIVE
BUN SERPL-MCNC: 13.3 MG/DL (ref 8.4–25.7)
CALCIUM SERPL-MCNC: 8.3 MG/DL (ref 8.8–10)
CHLORIDE SERPL-SCNC: 112 MMOL/L (ref 98–111)
CLARITY UR: CLEAR
CO2 SERPL-SCNC: 23 MMOL/L (ref 23–31)
COLOR UR AUTO: ABNORMAL
CREAT SERPL-MCNC: 0.95 MG/DL (ref 0.73–1.18)
CREAT/UREA NIT SERPL: 14
EOSINOPHIL # BLD AUTO: 0.36 X10(3)/MCL (ref 0–0.9)
EOSINOPHIL NFR BLD AUTO: 6.1 %
ERYTHROCYTE [DISTWIDTH] IN BLOOD BY AUTOMATED COUNT: 14.4 % (ref 11.5–17)
GFR SERPLBLD CREATININE-BSD FMLA CKD-EPI: >60 ML/MIN/1.73/M2
GLUCOSE SERPL-MCNC: 78 MG/DL (ref 75–121)
GLUCOSE UR QL STRIP: NORMAL
HCT VFR BLD AUTO: 30.8 % (ref 42–52)
HGB BLD-MCNC: 9.6 G/DL (ref 14–18)
HGB UR QL STRIP: NEGATIVE
IMM GRANULOCYTES # BLD AUTO: 0.02 X10(3)/MCL (ref 0–0.04)
IMM GRANULOCYTES NFR BLD AUTO: 0.3 %
KETONES UR QL STRIP: NEGATIVE
LEUKOCYTE ESTERASE UR QL STRIP: 75
LYMPHOCYTES # BLD AUTO: 0.81 X10(3)/MCL (ref 0.6–4.6)
LYMPHOCYTES NFR BLD AUTO: 13.7 %
MCH RBC QN AUTO: 28.7 PG (ref 27–31)
MCHC RBC AUTO-ENTMCNC: 31.2 G/DL (ref 33–36)
MCV RBC AUTO: 91.9 FL (ref 80–94)
MONOCYTES # BLD AUTO: 0.51 X10(3)/MCL (ref 0.1–1.3)
MONOCYTES NFR BLD AUTO: 8.6 %
NEUTROPHILS # BLD AUTO: 4.19 X10(3)/MCL (ref 2.1–9.2)
NEUTROPHILS NFR BLD AUTO: 70.8 %
NITRITE UR QL STRIP: NEGATIVE
NRBC BLD AUTO-RTO: 0 %
PH UR STRIP: 5 [PH]
PLATELET # BLD AUTO: 227 X10(3)/MCL (ref 130–400)
PMV BLD AUTO: 11.9 FL (ref 7.4–10.4)
POTASSIUM SERPL-SCNC: 3.7 MMOL/L (ref 3.5–5.1)
PROT UR QL STRIP: NEGATIVE
RBC # BLD AUTO: 3.35 X10(6)/MCL (ref 4.7–6.1)
RBC #/AREA URNS AUTO: ABNORMAL /HPF
SODIUM SERPL-SCNC: 143 MMOL/L (ref 136–145)
SP GR UR STRIP.AUTO: 1.01 (ref 1–1.03)
SQUAMOUS #/AREA URNS LPF: ABNORMAL /HPF
UROBILINOGEN UR STRIP-ACNC: NORMAL
WBC # BLD AUTO: 5.92 X10(3)/MCL (ref 4.5–11.5)
WBC #/AREA URNS AUTO: ABNORMAL /HPF

## 2024-10-06 PROCEDURE — 25000242 PHARM REV CODE 250 ALT 637 W/ HCPCS: Performed by: STUDENT IN AN ORGANIZED HEALTH CARE EDUCATION/TRAINING PROGRAM

## 2024-10-06 PROCEDURE — 85025 COMPLETE CBC W/AUTO DIFF WBC: CPT | Performed by: INTERNAL MEDICINE

## 2024-10-06 PROCEDURE — 21400001 HC TELEMETRY ROOM

## 2024-10-06 PROCEDURE — 99900031 HC PATIENT EDUCATION (STAT)

## 2024-10-06 PROCEDURE — 99900035 HC TECH TIME PER 15 MIN (STAT)

## 2024-10-06 PROCEDURE — 25000003 PHARM REV CODE 250: Performed by: STUDENT IN AN ORGANIZED HEALTH CARE EDUCATION/TRAINING PROGRAM

## 2024-10-06 PROCEDURE — 94760 N-INVAS EAR/PLS OXIMETRY 1: CPT

## 2024-10-06 PROCEDURE — 81001 URINALYSIS AUTO W/SCOPE: CPT | Performed by: INTERNAL MEDICINE

## 2024-10-06 PROCEDURE — 94761 N-INVAS EAR/PLS OXIMETRY MLT: CPT

## 2024-10-06 PROCEDURE — 92610 EVALUATE SWALLOWING FUNCTION: CPT

## 2024-10-06 PROCEDURE — 99233 SBSQ HOSP IP/OBS HIGH 50: CPT | Mod: ,,, | Performed by: INTERNAL MEDICINE

## 2024-10-06 PROCEDURE — 36415 COLL VENOUS BLD VENIPUNCTURE: CPT | Performed by: INTERNAL MEDICINE

## 2024-10-06 PROCEDURE — A4216 STERILE WATER/SALINE, 10 ML: HCPCS | Performed by: INTERNAL MEDICINE

## 2024-10-06 PROCEDURE — 25000003 PHARM REV CODE 250: Performed by: INTERNAL MEDICINE

## 2024-10-06 PROCEDURE — 94640 AIRWAY INHALATION TREATMENT: CPT

## 2024-10-06 PROCEDURE — 80048 BASIC METABOLIC PNL TOTAL CA: CPT | Performed by: INTERNAL MEDICINE

## 2024-10-06 RX ADMIN — METOPROLOL TARTRATE 25 MG: 25 TABLET, FILM COATED ORAL at 08:10

## 2024-10-06 RX ADMIN — FINASTERIDE 5 MG: 5 TABLET, FILM COATED ORAL at 08:10

## 2024-10-06 RX ADMIN — Medication: at 08:10

## 2024-10-06 RX ADMIN — SODIUM CHLORIDE, PRESERVATIVE FREE 10 ML: 5 INJECTION INTRAVENOUS at 05:10

## 2024-10-06 RX ADMIN — ALBUTEROL SULFATE 2.5 MG: 2.5 SOLUTION RESPIRATORY (INHALATION) at 08:10

## 2024-10-06 RX ADMIN — MICONAZOLE NITRATE: 20 POWDER TOPICAL at 08:10

## 2024-10-06 RX ADMIN — ALBUTEROL SULFATE 2.5 MG: 2.5 SOLUTION RESPIRATORY (INHALATION) at 04:10

## 2024-10-06 RX ADMIN — ALBUTEROL SULFATE 2.5 MG: 2.5 SOLUTION RESPIRATORY (INHALATION) at 12:10

## 2024-10-06 RX ADMIN — ALBUTEROL SULFATE 2.5 MG: 2.5 SOLUTION RESPIRATORY (INHALATION) at 07:10

## 2024-10-06 RX ADMIN — ALLOPURINOL 300 MG: 300 TABLET ORAL at 08:10

## 2024-10-06 RX ADMIN — ACETAMINOPHEN 650 MG: 325 TABLET, FILM COATED ORAL at 08:10

## 2024-10-06 RX ADMIN — SODIUM CHLORIDE, PRESERVATIVE FREE 10 ML: 5 INJECTION INTRAVENOUS at 02:10

## 2024-10-06 RX ADMIN — ALBUTEROL SULFATE 2.5 MG: 2.5 SOLUTION RESPIRATORY (INHALATION) at 03:10

## 2024-10-06 RX ADMIN — QUETIAPINE FUMARATE 12.5 MG: 25 TABLET ORAL at 08:10

## 2024-10-06 NOTE — PT/OT/SLP EVAL
[facility]  Speech Language Pathology Department  Clinical Swallow Evaluation    Patient Name:  Yusef Dodd   MRN:  [id number]    Recommendations     General recommendations:  SLP follow up x1 for diet tolerance with nursing  Solid texture recommendation:  Soft & Bite Sized Diet - IDDSI Level 6  Liquid consistency recommendation: Mildly thick liquids - IDDSI Level 2   Medications: crushed in puree  Swallow strategies/precautions: small bites/sips, slow rate, NO straws, upright for PO intake, supervision with all PO intake, assist with feeding as needed, and feed only when fully alert  Precautions:      History     Yusef Dodd is a/n 89 y.o. male admitted for GI bleed, AMS.    Past Medical History:   Diagnosis Date    Essential (primary) hypertension     Gout     High cholesterol      Past Surgical History:   Procedure Laterality Date    COLONOSCOPY N/A 9/30/2024    Procedure: COLON;  Surgeon: Jens Lizama MD;  Location: [facility] ENDOSCOPY;  Service: Gastroenterology;  Laterality: N/A;    HERNIA REPAIR      HIP SURGERY      fracture of femur       Home diet texture/consistency: Regular and thin liquids  Current method of nutrition: PO diet (Regular, thin liquids )    Patient complaint: confused, unable to state.    Imaging   Results for orders placed during the hospital encounter of 07/31/24    X-Ray Chest 1 View    Narrative  EXAMINATION:  XR CHEST 1 VIEW    CLINICAL HISTORY:  Weakness    TECHNIQUE:  Frontal view(s) of the chest.    COMPARISON:  No relevant comparison studies available at the time of dictation.    FINDINGS:  Normal cardiac silhouette.  The lungs are well-inflated.  No consolidation identified.  No significant pleural effusion or discernible pneumothorax.    Impression  No acute pulmonary process identified.      Electronically signed by: Leonard Vital  Date:    07/31/2024  Time:    11:00    No results found for this or any previous visit.    No results found  for this or any previous visit.    Subjective     Patient cooperative and confused.    Patient goals: unable to state goal due to cognitive status.   Spiritual/Cultural/Islam Beliefs/Practices that affect care: no    Pain/Comfort: Pain Rating 1: 0/10    Respiratory Status:  room air    Restraints/positioning devices: soft mittens    Objective     ORAL MUSCULATURE  Dentition: own teeth and missing teeth  Secretion Management: adequate  Mucosal Quality: good  Facial Movement: WFL  Buccal Strength & Mobility: WFL  Mandibular Strength & Mobility: WFL  Oral Labial Strength & Mobility: WFL  Lingual Strength & Mobility: WFL  Vocal Quality: adequate  Volitional Cough: Able to clear secretions      PO TRIALS  Consistency Fed By Oral Symptoms Pharyngeal Symptoms   Thin liquid by straw SLP None Cough after swallow   Thin liquid by cup (controlled small sips)  SLP None None   Puree SLP Oral residue  Cleared with cued swallow None   Chewable solid SLP None Cough after swallow   Soft & Bite-sized solid SLP None None     Assessment     Pt presented with coughing after the swallow with thin liquids via straw and regular solids. No signs/sx present with soft and bite sized solids, thin liquids via controlled small cup sips.      ST to follow up x1 for diet tolerance.     Outcome Measures     Functional Oral Intake Scale: 5 - Total oral diet with multiple consistencies, by requiring special preparation or compensations    Goals     Multidisciplinary Problems       SLP Goals       Not on file                  Education     Patient provided with verbal education regarding upright for all intake.  Additional teaching is warranted.    Plan     SLP Follow-Up:  Yes   Plan of Care reviewed with:  patient     Time Tracking     SLP Treatment Date:    10/6/2024  Speech Start Time:  1045  Speech Stop Time:  1100     Speech Total Time (min):  15 min    Billable minutes:  Swallow and Oral Function Evaluation, 15 minutes     10/06/2024

## 2024-10-06 NOTE — PT/OT/SLP PROGRESS
"Physical Therapy      Patient Name:  Yusef Dodd   MRN:  [id number]    Attempted to see pt for PT session. Pt initially agreeable to session but when attempting to assist pt in sitting up, pt repeatedly saying "no" and asking to be left alone despite encouragement for participation. Will re-attempt tomorrow.     "

## 2024-10-06 NOTE — PROGRESS NOTES
[facility] [address] Medicine  Progress Note    Patient Name: Yusef Dodd  MRN: [id number]  Patient Class: IP- Inpatient   Admission Date: 9/28/2024  Length of Stay: 7 days  Attending Physician: Ayala Villagomez, *  Primary Care Provider: Ayala Villagomez MD        Subjective:     Principal Problem:<principal problem not specified>        HPI:  No notes on file    Overview/Hospital Course:  No notes on file    Interval History: Patient remains confused.  CNA at bedside feeding him breakfast, and he seems to be choking more so with the liquids.  Nurse requesting a ST eval.  He doesn't really answer questions appropriately or follow commands.     Review of Systems  Objective:     Vital Signs (Most Recent):  Temp: 98.4 °F (36.9 °C) (10/06/24 0801)  Pulse: 96 (10/06/24 0814)  Resp: 17 (10/06/24 0436)  BP: 129/68 (10/06/24 0814)  SpO2: (!) 94 % (10/06/24 0803) Vital Signs (24h Range):  Temp:  [97.3 °F (36.3 °C)-98.6 °F (37 °C)] 98.4 °F (36.9 °C)  Pulse:  [] 96  Resp:  [17-23] 17  SpO2:  [90 %-97 %] 94 %  BP: (100-167)/(50-83) 129/68     Weight: 90 kg (198 lb 6.6 oz)  Body mass index is 29.3 kg/m².  No intake or output data in the 24 hours ending 10/06/24 0847      Physical Exam  Constitutional:       Comments: Some coughing as he eats     HENT:      Head: Normocephalic and atraumatic.   Cardiovascular:      Heart sounds: Normal heart sounds.   Pulmonary:      Effort: Pulmonary effort is normal.      Breath sounds: Normal breath sounds.   Abdominal:      General: Abdomen is flat.      Palpations: Abdomen is soft.   Skin:     General: Skin is warm and dry.   Neurological:      Mental Status: He is alert. He is disoriented.      Comments: Can move all extremities, but he can't lift his legs off the bed against gravity   Psychiatric:      Comments: Confused               Significant Labs: All pertinent labs within the past 24 hours have been reviewed.  CBC:   Recent Labs    Lab 10/05/24  1936 10/06/24  0422   WBC 5.53 5.92   HGB 9.8* 9.6*   HCT 31.5* 30.8*    227     CMP:   Recent Labs   Lab 10/05/24  1936 10/06/24  0422    143   K 3.7 3.7    112*   CO2 24 23   BUN 14.0 13.3   CREATININE 0.95 0.95   CALCIUM 8.2* 8.3*   ALBUMIN 2.7*  --    BILITOT 0.6  --    ALKPHOS 76  --    AST 49*  --    ALT 31  --        Significant Imaging: I have reviewed all pertinent imaging results/findings within the past 24 hours.  CT: I have reviewed all pertinent results/findings within the past 24 hours and my personal findings are:  No acute intracranial abnormalities.    Assessment/Plan:      Altered mental status  Remains confused.  He has a known h/o dementia.  CT without any acute changes.  Could consider MRI.  Will defer to Dr. Velasquez.  This is likely a hospital induced delirium.  He has a pre-existing dementia.          Gastrointestinal hemorrhage  Low GI bleed, presented with complaints of bleeding per rectum, FOBT positive on admission  GI consulted taken for colonoscopy 09/30 with no acute findings  H/H have remained stable since admission  GI has signed off     Essential hypertension  Blood pressure has been controlled  Continue to monitor   Holding antihypertensives at the time     Stage 2 chronic kidney disease  Creatinine is normal.    Dysphgia:  ST eval.    VTE Risk Mitigation (From admission, onward)           Ordered     IP VTE HIGH RISK PATIENT  Once         09/29/24 0412     Place sequential compression device  Until discontinued         09/29/24 0412                    Discharge Planning   BRENDA:      Code Status: Full Code   Is the patient medically ready for discharge?:     Reason for patient still in hospital (select all that apply): Patient trending condition  Discharge Plan A: Skilled Nursing Facility                  Swetha Gutierrez MD  Department of Hospital Medicine   [facility]

## 2024-10-06 NOTE — ASSESSMENT & PLAN NOTE
Remains confused.  He has a known h/o delirium.  CT without any acute changes.  Could consider MRI.  Will defer to Dr. Velasquez.  This is likely a hospital induced delirium.  He has a pre-existing dementia.

## 2024-10-06 NOTE — SUBJECTIVE & OBJECTIVE
Interval History: Patient remains confused.  CNA at bedside feeding him breakfast, and he seems to be choking more so with the liquids.  Nurse requesting a ST eval.  He doesn't really answer questions appropriately or follow commands.     Review of Systems  Objective:     Vital Signs (Most Recent):  Temp: 98.4 °F (36.9 °C) (10/06/24 0801)  Pulse: 96 (10/06/24 0814)  Resp: 17 (10/06/24 0436)  BP: 129/68 (10/06/24 0814)  SpO2: (!) 94 % (10/06/24 0803) Vital Signs (24h Range):  Temp:  [97.3 °F (36.3 °C)-98.6 °F (37 °C)] 98.4 °F (36.9 °C)  Pulse:  [] 96  Resp:  [17-23] 17  SpO2:  [90 %-97 %] 94 %  BP: (100-167)/(50-83) 129/68     Weight: 90 kg (198 lb 6.6 oz)  Body mass index is 29.3 kg/m².  No intake or output data in the 24 hours ending 10/06/24 0847      Physical Exam  Constitutional:       Comments: Some coughing as he eats     HENT:      Head: Normocephalic and atraumatic.   Cardiovascular:      Heart sounds: Normal heart sounds.   Pulmonary:      Effort: Pulmonary effort is normal.      Breath sounds: Normal breath sounds.   Abdominal:      General: Abdomen is flat.      Palpations: Abdomen is soft.   Skin:     General: Skin is warm and dry.   Neurological:      Mental Status: He is alert. He is disoriented.      Comments: Can move all extremities, but he can't lift his legs off the bed against gravity   Psychiatric:      Comments: Confused               Significant Labs: All pertinent labs within the past 24 hours have been reviewed.  CBC:   Recent Labs   Lab 10/05/24  1936 10/06/24  0422   WBC 5.53 5.92   HGB 9.8* 9.6*   HCT 31.5* 30.8*    227     CMP:   Recent Labs   Lab 10/05/24  1936 10/06/24  0422    143   K 3.7 3.7    112*   CO2 24 23   BUN 14.0 13.3   CREATININE 0.95 0.95   CALCIUM 8.2* 8.3*   ALBUMIN 2.7*  --    BILITOT 0.6  --    ALKPHOS 76  --    AST 49*  --    ALT 31  --        Significant Imaging: I have reviewed all pertinent imaging results/findings within the past 24  hours.  CT: I have reviewed all pertinent results/findings within the past 24 hours and my personal findings are:  No acute intracranial abnormalities.

## 2024-10-07 LAB
ALBUMIN SERPL-MCNC: 2.8 G/DL (ref 3.4–4.8)
ALBUMIN/GLOB SERPL: 0.9 RATIO (ref 1.1–2)
ALP SERPL-CCNC: 90 UNIT/L (ref 40–150)
ALT SERPL-CCNC: 32 UNIT/L (ref 0–55)
ANION GAP SERPL CALC-SCNC: 7 MEQ/L
AST SERPL-CCNC: 47 UNIT/L (ref 5–34)
BACTERIA BLD CULT: NORMAL
BASOPHILS # BLD AUTO: 0.04 X10(3)/MCL
BASOPHILS NFR BLD AUTO: 0.6 %
BILIRUB SERPL-MCNC: 0.7 MG/DL
BUN SERPL-MCNC: 13.1 MG/DL (ref 8.4–25.7)
CALCIUM SERPL-MCNC: 8.9 MG/DL (ref 8.8–10)
CHLORIDE SERPL-SCNC: 108 MMOL/L (ref 98–111)
CO2 SERPL-SCNC: 26 MMOL/L (ref 23–31)
CREAT SERPL-MCNC: 0.97 MG/DL (ref 0.73–1.18)
CREAT/UREA NIT SERPL: 14
EOSINOPHIL # BLD AUTO: 0.32 X10(3)/MCL (ref 0–0.9)
EOSINOPHIL NFR BLD AUTO: 4.5 %
ERYTHROCYTE [DISTWIDTH] IN BLOOD BY AUTOMATED COUNT: 14.3 % (ref 11.5–17)
GFR SERPLBLD CREATININE-BSD FMLA CKD-EPI: >60 ML/MIN/1.73/M2
GLOBULIN SER-MCNC: 3 GM/DL (ref 2.4–3.5)
GLUCOSE SERPL-MCNC: 89 MG/DL (ref 75–121)
HCT VFR BLD AUTO: 34.8 % (ref 42–52)
HGB BLD-MCNC: 10.9 G/DL (ref 14–18)
IMM GRANULOCYTES # BLD AUTO: 0.03 X10(3)/MCL (ref 0–0.04)
IMM GRANULOCYTES NFR BLD AUTO: 0.4 %
LYMPHOCYTES # BLD AUTO: 0.54 X10(3)/MCL (ref 0.6–4.6)
LYMPHOCYTES NFR BLD AUTO: 7.6 %
MCH RBC QN AUTO: 29.4 PG (ref 27–31)
MCHC RBC AUTO-ENTMCNC: 31.3 G/DL (ref 33–36)
MCV RBC AUTO: 93.8 FL (ref 80–94)
MONOCYTES # BLD AUTO: 0.66 X10(3)/MCL (ref 0.1–1.3)
MONOCYTES NFR BLD AUTO: 9.3 %
NEUTROPHILS # BLD AUTO: 5.53 X10(3)/MCL (ref 2.1–9.2)
NEUTROPHILS NFR BLD AUTO: 77.6 %
NRBC BLD AUTO-RTO: 0 %
PLATELET # BLD AUTO: 252 X10(3)/MCL (ref 130–400)
PMV BLD AUTO: 11.3 FL (ref 7.4–10.4)
POTASSIUM SERPL-SCNC: 3.7 MMOL/L (ref 3.5–5.1)
PROT SERPL-MCNC: 5.8 GM/DL (ref 5.8–7.6)
RBC # BLD AUTO: 3.71 X10(6)/MCL (ref 4.7–6.1)
SODIUM SERPL-SCNC: 141 MMOL/L (ref 136–145)
WBC # BLD AUTO: 7.12 X10(3)/MCL (ref 4.5–11.5)

## 2024-10-07 PROCEDURE — 25000003 PHARM REV CODE 250: Performed by: INTERNAL MEDICINE

## 2024-10-07 PROCEDURE — 85025 COMPLETE CBC W/AUTO DIFF WBC: CPT | Performed by: STUDENT IN AN ORGANIZED HEALTH CARE EDUCATION/TRAINING PROGRAM

## 2024-10-07 PROCEDURE — 21400001 HC TELEMETRY ROOM

## 2024-10-07 PROCEDURE — 25000242 PHARM REV CODE 250 ALT 637 W/ HCPCS: Performed by: STUDENT IN AN ORGANIZED HEALTH CARE EDUCATION/TRAINING PROGRAM

## 2024-10-07 PROCEDURE — 94640 AIRWAY INHALATION TREATMENT: CPT

## 2024-10-07 PROCEDURE — 36415 COLL VENOUS BLD VENIPUNCTURE: CPT | Performed by: STUDENT IN AN ORGANIZED HEALTH CARE EDUCATION/TRAINING PROGRAM

## 2024-10-07 PROCEDURE — 94761 N-INVAS EAR/PLS OXIMETRY MLT: CPT

## 2024-10-07 PROCEDURE — 99900031 HC PATIENT EDUCATION (STAT)

## 2024-10-07 PROCEDURE — 25000003 PHARM REV CODE 250: Performed by: STUDENT IN AN ORGANIZED HEALTH CARE EDUCATION/TRAINING PROGRAM

## 2024-10-07 PROCEDURE — 97116 GAIT TRAINING THERAPY: CPT | Mod: CQ

## 2024-10-07 PROCEDURE — 63600175 PHARM REV CODE 636 W HCPCS: Performed by: INTERNAL MEDICINE

## 2024-10-07 PROCEDURE — 94760 N-INVAS EAR/PLS OXIMETRY 1: CPT

## 2024-10-07 PROCEDURE — 99900035 HC TECH TIME PER 15 MIN (STAT)

## 2024-10-07 PROCEDURE — 99233 SBSQ HOSP IP/OBS HIGH 50: CPT | Mod: ,,, | Performed by: STUDENT IN AN ORGANIZED HEALTH CARE EDUCATION/TRAINING PROGRAM

## 2024-10-07 PROCEDURE — 80053 COMPREHEN METABOLIC PANEL: CPT | Performed by: STUDENT IN AN ORGANIZED HEALTH CARE EDUCATION/TRAINING PROGRAM

## 2024-10-07 PROCEDURE — A4216 STERILE WATER/SALINE, 10 ML: HCPCS | Performed by: INTERNAL MEDICINE

## 2024-10-07 RX ORDER — HYDRALAZINE HYDROCHLORIDE 25 MG/1
25 TABLET, FILM COATED ORAL 2 TIMES DAILY
Qty: 180 TABLET | Refills: 3 | OUTPATIENT
Start: 2024-10-07 | End: 2024-10-08

## 2024-10-07 RX ADMIN — ALBUTEROL SULFATE 2.5 MG: 2.5 SOLUTION RESPIRATORY (INHALATION) at 08:10

## 2024-10-07 RX ADMIN — SODIUM CHLORIDE, PRESERVATIVE FREE 10 ML: 5 INJECTION INTRAVENOUS at 12:10

## 2024-10-07 RX ADMIN — SODIUM CHLORIDE, PRESERVATIVE FREE 10 ML: 5 INJECTION INTRAVENOUS at 05:10

## 2024-10-07 RX ADMIN — ALLOPURINOL 300 MG: 300 TABLET ORAL at 08:10

## 2024-10-07 RX ADMIN — MICONAZOLE NITRATE: 20 POWDER TOPICAL at 08:10

## 2024-10-07 RX ADMIN — FINASTERIDE 5 MG: 5 TABLET, FILM COATED ORAL at 08:10

## 2024-10-07 RX ADMIN — HYDRALAZINE HYDROCHLORIDE 10 MG: 20 INJECTION INTRAMUSCULAR; INTRAVENOUS at 04:10

## 2024-10-07 RX ADMIN — ALBUTEROL SULFATE 2.5 MG: 2.5 SOLUTION RESPIRATORY (INHALATION) at 04:10

## 2024-10-07 RX ADMIN — ALBUTEROL SULFATE 2.5 MG: 2.5 SOLUTION RESPIRATORY (INHALATION) at 01:10

## 2024-10-07 RX ADMIN — ACETAMINOPHEN 650 MG: 325 TABLET, FILM COATED ORAL at 06:10

## 2024-10-07 RX ADMIN — Medication: at 08:10

## 2024-10-07 RX ADMIN — QUETIAPINE FUMARATE 12.5 MG: 25 TABLET ORAL at 08:10

## 2024-10-07 RX ADMIN — METOPROLOL TARTRATE 25 MG: 25 TABLET, FILM COATED ORAL at 08:10

## 2024-10-07 RX ADMIN — ALBUTEROL SULFATE 2.5 MG: 2.5 SOLUTION RESPIRATORY (INHALATION) at 12:10

## 2024-10-07 NOTE — PT/OT/SLP PROGRESS
Physical Therapy Treatment    Patient Name:  Yusef Dodd   MRN:  [id number]    Recommendations:     Discharge therapy intensity: Moderate Intensity Therapy   Discharge Equipment Recommendations: to be determined by next level of care  Barriers to discharge: Decreased caregiver support, Impaired mobility, and Ongoing medical needs    Assessment:     Yusef Dodd is a 89 y.o. male admitted with a medical diagnosis of GIB.  He presents with the following impairments/functional limitations: weakness, gait instability, impaired balance, impaired endurance, decreased safety awareness, impaired self care skills, impaired functional mobility.    Rehab Prognosis: Fair; patient would benefit from acute skilled PT services to address these deficits and reach maximum level of function.    Recent Surgery: Procedure(s) (LRB):  COLON (N/A) 7 Days Post-Op    Plan:     During this hospitalization, patient would benefit from acute PT services 5 x/week to address the identified rehab impairments via gait training, therapeutic activities, therapeutic exercises and progress toward the following goals:    Plan of Care Expires:  11/01/24    Subjective     Chief Complaint: none    Objective:     Communicated with nurse prior to session.  Patient found supine with telemetry, peripheral IV, pressure relief boots upon PT entry to room.     General Precautions: Standard, fall  Orthopedic Precautions: N/A  Braces: N/A  Respiratory Status: Room air  Blood Pressure: 163/77  Skin Integrity: Visible skin intact      Functional Mobility:  Max assist to get EOB and mod STST  Gait 75 ft x 2 with RW mod assist. Forward lean with poor walker proximity.  Assisted BTB due to LOC and unsafe to sit.     Education Provided:  Role and goals of PT, transfer training, bed mobility, gait training, balance training, safety awareness, assistive device, strengthening exercises, and importance of participating in PT to return to PLOF.    Patient left right  sidelying with all lines intact, call button in reach, and chair alarm on    GOALS:   Multidisciplinary Problems       Physical Therapy Goals          Problem: Physical Therapy    Goal Priority Disciplines Outcome Interventions   Physical Therapy Goal     PT, PT/OT Progressing    Description: Goals to be met by: 24     Patient will increase functional independence with mobility by performin. Supine to sit with Set-up [place]  2. Sit to supine with Set-up [place]  3. Sit to stand transfer with Stand-by Assistance  4. Gait  x 200 feet with Stand-by Assistance using Rolling Walker.   5. Ascend/descend 3 steps with Stand-by Assistance without using handrails                          Time Tracking:     Billable Minutes: Gait Training 24    Treatment Type: Treatment  PT/PTA: PTA     Number of PTA visits since last PT visit: 4     10/07/2024

## 2024-10-07 NOTE — PROGRESS NOTES
[facility]  Speech Language Pathology Department  Diet Tolerance Follow-up    Patient Name:  Yusef Dodd   MRN:  [id number]    Recommendations     General recommendations:  SLP intervention not indicated  Solid texture recommendation:  Soft & Bite Sized Diet - IDDSI Level 6  Liquid consistency recommendation: Mildly thick liquids - IDDSI Level 2   Medications: crushed in puree  Swallow strategies/precautions:  small bites/sips, slow rate, NO straws, upright for PO intake, supervision with all PO intake, assist with feeding as needed, and feed only when fully alert     Diet Tolerance     Nursing reports no difficulty regarding diet tolerance.    Outcome Measures     Functional Oral Intake Scale: 5 - Total oral diet with multiple consistencies, by requiring special preparation or compensations    Plan     SLP Follow-Up: No    Time Tracking     SLP Treatment Date: 10/07/2024

## 2024-10-07 NOTE — PLAN OF CARE
Received call from [facility]'s admissions coordinator. She stated the doctor wanted to look at today's clinicals before making a decision. Clinical updates uploaded to [facility].

## 2024-10-08 ENCOUNTER — EXTERNAL HOME HEALTH (OUTPATIENT)
Dept: HOME HEALTH SERVICES | Facility: HOSPITAL | Age: 89
End: 2024-10-08
Payer: MEDICARE

## 2024-10-08 VITALS
TEMPERATURE: 99 F | HEART RATE: 81 BPM | HEIGHT: 69 IN | OXYGEN SATURATION: 95 % | RESPIRATION RATE: 18 BRPM | DIASTOLIC BLOOD PRESSURE: 68 MMHG | BODY MASS INDEX: 29.39 KG/M2 | SYSTOLIC BLOOD PRESSURE: 123 MMHG | WEIGHT: 198.44 LBS

## 2024-10-08 PROBLEM — E44.0 MODERATE MALNUTRITION: Status: ACTIVE | Noted: 2024-10-08

## 2024-10-08 LAB
ALBUMIN SERPL-MCNC: 2.6 G/DL (ref 3.4–4.8)
ALBUMIN/GLOB SERPL: 1.2 RATIO (ref 1.1–2)
ALP SERPL-CCNC: 85 UNIT/L (ref 40–150)
ALT SERPL-CCNC: 34 UNIT/L (ref 0–55)
ANION GAP SERPL CALC-SCNC: 9 MEQ/L
AST SERPL-CCNC: 48 UNIT/L (ref 5–34)
BASOPHILS # BLD AUTO: 0.02 X10(3)/MCL
BASOPHILS NFR BLD AUTO: 0.3 %
BILIRUB SERPL-MCNC: 0.5 MG/DL
BUN SERPL-MCNC: 18.3 MG/DL (ref 8.4–25.7)
CALCIUM SERPL-MCNC: 8.4 MG/DL (ref 8.8–10)
CHLORIDE SERPL-SCNC: 110 MMOL/L (ref 98–111)
CO2 SERPL-SCNC: 24 MMOL/L (ref 23–31)
CREAT SERPL-MCNC: 1.01 MG/DL (ref 0.73–1.18)
CREAT/UREA NIT SERPL: 18
EOSINOPHIL # BLD AUTO: 0.29 X10(3)/MCL (ref 0–0.9)
EOSINOPHIL NFR BLD AUTO: 4.9 %
ERYTHROCYTE [DISTWIDTH] IN BLOOD BY AUTOMATED COUNT: 14.3 % (ref 11.5–17)
GFR SERPLBLD CREATININE-BSD FMLA CKD-EPI: >60 ML/MIN/1.73/M2
GLOBULIN SER-MCNC: 2.2 GM/DL (ref 2.4–3.5)
GLUCOSE SERPL-MCNC: 101 MG/DL (ref 75–121)
HCT VFR BLD AUTO: 30.8 % (ref 42–52)
HGB BLD-MCNC: 9.9 G/DL (ref 14–18)
IMM GRANULOCYTES # BLD AUTO: 0.03 X10(3)/MCL (ref 0–0.04)
IMM GRANULOCYTES NFR BLD AUTO: 0.5 %
LYMPHOCYTES # BLD AUTO: 0.93 X10(3)/MCL (ref 0.6–4.6)
LYMPHOCYTES NFR BLD AUTO: 15.7 %
MCH RBC QN AUTO: 28.9 PG (ref 27–31)
MCHC RBC AUTO-ENTMCNC: 32.1 G/DL (ref 33–36)
MCV RBC AUTO: 90.1 FL (ref 80–94)
MONOCYTES # BLD AUTO: 0.72 X10(3)/MCL (ref 0.1–1.3)
MONOCYTES NFR BLD AUTO: 12.1 %
NEUTROPHILS # BLD AUTO: 3.95 X10(3)/MCL (ref 2.1–9.2)
NEUTROPHILS NFR BLD AUTO: 66.5 %
NRBC BLD AUTO-RTO: 0 %
PLATELET # BLD AUTO: 235 X10(3)/MCL (ref 130–400)
PMV BLD AUTO: 11.6 FL (ref 7.4–10.4)
POTASSIUM SERPL-SCNC: 3.9 MMOL/L (ref 3.5–5.1)
PROT SERPL-MCNC: 4.8 GM/DL (ref 5.8–7.6)
RBC # BLD AUTO: 3.42 X10(6)/MCL (ref 4.7–6.1)
SODIUM SERPL-SCNC: 143 MMOL/L (ref 136–145)
WBC # BLD AUTO: 5.94 X10(3)/MCL (ref 4.5–11.5)

## 2024-10-08 PROCEDURE — 36415 COLL VENOUS BLD VENIPUNCTURE: CPT | Performed by: STUDENT IN AN ORGANIZED HEALTH CARE EDUCATION/TRAINING PROGRAM

## 2024-10-08 PROCEDURE — 99900031 HC PATIENT EDUCATION (STAT)

## 2024-10-08 PROCEDURE — 25000003 PHARM REV CODE 250: Performed by: INTERNAL MEDICINE

## 2024-10-08 PROCEDURE — 80053 COMPREHEN METABOLIC PANEL: CPT | Performed by: STUDENT IN AN ORGANIZED HEALTH CARE EDUCATION/TRAINING PROGRAM

## 2024-10-08 PROCEDURE — A4216 STERILE WATER/SALINE, 10 ML: HCPCS | Performed by: INTERNAL MEDICINE

## 2024-10-08 PROCEDURE — 99900035 HC TECH TIME PER 15 MIN (STAT)

## 2024-10-08 PROCEDURE — 25000242 PHARM REV CODE 250 ALT 637 W/ HCPCS: Performed by: STUDENT IN AN ORGANIZED HEALTH CARE EDUCATION/TRAINING PROGRAM

## 2024-10-08 PROCEDURE — 85025 COMPLETE CBC W/AUTO DIFF WBC: CPT | Performed by: STUDENT IN AN ORGANIZED HEALTH CARE EDUCATION/TRAINING PROGRAM

## 2024-10-08 PROCEDURE — 94640 AIRWAY INHALATION TREATMENT: CPT

## 2024-10-08 PROCEDURE — 94761 N-INVAS EAR/PLS OXIMETRY MLT: CPT

## 2024-10-08 PROCEDURE — 99239 HOSP IP/OBS DSCHRG MGMT >30: CPT | Mod: ,,, | Performed by: STUDENT IN AN ORGANIZED HEALTH CARE EDUCATION/TRAINING PROGRAM

## 2024-10-08 RX ORDER — METOPROLOL TARTRATE 25 MG/1
25 TABLET, FILM COATED ORAL 2 TIMES DAILY
Qty: 180 TABLET | Refills: 3 | Status: SHIPPED | OUTPATIENT
Start: 2024-10-08 | End: 2025-10-08

## 2024-10-08 RX ORDER — AMLODIPINE BESYLATE 10 MG/1
10 TABLET ORAL NIGHTLY
Qty: 30 TABLET | Refills: 11 | Status: SHIPPED | OUTPATIENT
Start: 2024-10-08 | End: 2025-10-08

## 2024-10-08 RX ORDER — QUETIAPINE FUMARATE 25 MG/1
25 TABLET, FILM COATED ORAL NIGHTLY
Start: 2024-10-08 | End: 2025-10-08

## 2024-10-08 RX ADMIN — SODIUM CHLORIDE, PRESERVATIVE FREE 10 ML: 5 INJECTION INTRAVENOUS at 06:10

## 2024-10-08 RX ADMIN — MICONAZOLE NITRATE: 20 POWDER TOPICAL at 08:10

## 2024-10-08 RX ADMIN — ALLOPURINOL 300 MG: 300 TABLET ORAL at 08:10

## 2024-10-08 RX ADMIN — ALBUTEROL SULFATE 2.5 MG: 2.5 SOLUTION RESPIRATORY (INHALATION) at 04:10

## 2024-10-08 RX ADMIN — ALBUTEROL SULFATE 2.5 MG: 2.5 SOLUTION RESPIRATORY (INHALATION) at 08:10

## 2024-10-08 RX ADMIN — FINASTERIDE 5 MG: 5 TABLET, FILM COATED ORAL at 08:10

## 2024-10-08 RX ADMIN — ALBUTEROL SULFATE 2.5 MG: 2.5 SOLUTION RESPIRATORY (INHALATION) at 12:10

## 2024-10-08 RX ADMIN — SODIUM CHLORIDE, PRESERVATIVE FREE 10 ML: 5 INJECTION INTRAVENOUS at 12:10

## 2024-10-08 RX ADMIN — METOPROLOL TARTRATE 25 MG: 25 TABLET, FILM COATED ORAL at 08:10

## 2024-10-08 RX ADMIN — Medication: at 08:10

## 2024-10-08 NOTE — PLAN OF CARE
[facility] sent clinical updates to [facility] via [employer]. Patient can be admitted once medically cleared.

## 2024-10-08 NOTE — PROGRESS NOTES
Inpatient Nutrition Assessment    Admit Date: 9/28/2024   Total duration of encounter: 10 days   Patient Age: 89 y.o.    Nutrition Recommendation/Prescription     Continue soft and bite-sized diet as tolerated and per SLP   Boost Plus BID (provided 360 kcal, 1.4 gm protein per serving). Encourage adequate intake.  Consider appetite stimulant as medically appropriate   Monitor labs, intake and weight    Communication of Recommendations: reviewed with family    Nutrition Assessment     Malnutrition Assessment/Nutrition-Focused Physical Exam    Malnutrition Context: acute illness or injury (10/04/24 1303)  Malnutrition Level: mild (10/04/24 1303)  Energy Intake (Malnutrition): less than or equal to 50% for greater than or equal to 5 days (10/04/24 1303)  Weight Loss (Malnutrition): other (see comments) (does not meet criteria) (10/04/24 1303)  Subcutaneous Fat (Malnutrition): mild depletion (10/04/24 1303)  Orbital Region (Subcutaneous Fat Loss): mild depletion        Muscle Mass (Malnutrition): mild depletion (10/04/24 1303)  Tenriism Region (Muscle Loss): mild depletion                                A minimum of two characteristics is recommended for diagnosis of either severe or non-severe malnutrition.    Chart Review    Reason Seen: follow-up    Malnutrition Screening Tool Results   Have you recently lost weight without trying?: Unsure  Have you been eating poorly because of a decreased appetite?: No (unable to assess, patient is confused)   MST Score: 2   Diagnosis:  Dysphagia  AMS    Relevant Medical History: HTN, gout, high cholesterol, CKD     Scheduled Medications:  albuterol, 2.5 mg, Q4H  allopurinoL, 300 mg, Daily  finasteride, 5 mg, Daily  metoprolol tartrate, 25 mg, BID  miconazole NITRATE 2 %, , BID  QUEtiapine, 12.5 mg, Nightly  sodium chloride 0.9%, 10 mL, Q6H  zinc oxide-cod liver oil, , BID    Continuous Infusions:   PRN Medications:  acetaminophen, 650 mg, Q4H PRN  haloperidol lactate, 5 mg, Q6H  PRN  hydrALAZINE, 10 mg, Q4H PRN  metoprolol, 5 mg, Q2H PRN  sodium chloride 0.9%, 10 mL, PRN  sodium chloride 0.9%, 10 mL, PRN    Calorie Containing IV Medications: no significant kcals from medications at this time    Recent Labs   Lab 10/01/24  1830 10/02/24  0357 10/03/24  0450 10/04/24  0611 10/05/24  1936 10/06/24  0422 10/07/24  1039 10/08/24  0444   * 148* 147* 145 143 143 141 143   K 3.2* 3.5 3.6 3.5 3.7 3.7 3.7 3.9   CALCIUM 8.3* 8.4* 8.2* 7.9* 8.2* 8.3* 8.9 8.4*   * 117* 119* 116* 111 112* 108 110   CO2 22* 22* 23 23 24 23 26 24   BUN 19.9 17.5 16.5 17.8 14.0 13.3 13.1 18.3   CREATININE 1.24* 1.18 1.19* 1.00 0.95 0.95 0.97 1.01   EGFRNORACEVR 55 59 58 >60 >60 >60 >60 >60   GLUCOSE 121 98 87 85 112 78 89 101   BILITOT 0.5 0.6 0.7 0.7 0.6  --  0.7 0.5   ALKPHOS 70 76 70 75 76  --  90 85   ALT 19 22 22 26 31  --  32 34   AST 50* 53* 47* 46* 49*  --  47* 48*   ALBUMIN 2.8* 3.0* 2.7* 2.6* 2.7*  --  2.8* 2.6*   WBC 7.03 7.51 7.07 6.24 5.53 5.92 7.12 5.94   HGB 9.3* 9.6* 9.2* 9.7* 9.8* 9.6* 10.9* 9.9*   HCT 29.4* 31.0* 29.7* 30.5* 31.5* 30.8* 34.8* 30.8*     Nutrition Orders:  Diet Soft & Bite Sized (IDDSI Level 6) No Straws  Dietary nutrition supplements All Meals; Boost Plus Nutritional Drink - Any flavor    Appetite/Oral Intake: fair/50-75% of meals  Factors Affecting Nutritional Intake: impaired cognitive status/motor control, chewing difficulty, difficulty/impaired swallowing, and inability to feed self  Social Needs Impacting Access to Food: none identified  Food/Methodist/Cultural Preferences: none reported  Food Allergies: no known food allergies  Last Bowel Movement: 09/30/24  Wound(s):     Wound 09/29/24 0532 Moisture associated dermatitis midline Perineum-Tissue loss description: Not applicable     Comments    9/30/24: Pt asleep with mittens on, awaiting colonoscopy. Family members report good intake PTA, no c/s issues reported. Intake yesterday was poor prior to NPO status. No n/v/d/c or  "abdominal pain per notes. Last BM  noted. Family agreeable to trial ONS upon diet advancement to ensure adequate nutrition. Estimated admit weight of 198# noted. Unable to obtain weight history at this time. Per EMR weight history, UBW ~176# with previous weight of 172# on 24; weight appears stable. Will attempt NFPE upon follow up as appropriate.      10/4/24: Pt working with PT at time of visit. Pt with recent AMS and drowsiness per notes. Reports of patient being more alert today. Intake has been poor since admission d/t complications. Will continue current regimen and monitor need for modifications.     10/8/24: Family reports fair intake this AM, good intake last night. Drinks some of Boost, advised family to mix with milk and encourage intake. Denies n/v. Pt with mild muscle and fat depletion observed.    Anthropometrics    Height: 5' 9" (175.3 cm), Height Method: Estimated  Last Weight: 90 kg (198 lb 6.6 oz) (24), Weight Method: Estimated  BMI (Calculated): 29.3  BMI Classification: overweight (BMI 25-29.9)        Ideal Body Weight (IBW), Male: 160 lb     % Ideal Body Weight, Male (lb): 124.01 %                 Usual Body Weight (UBW), k kg  % Usual Body Weight: 112.74     Usual Weight Provided By: EMR weight history    Wt Readings from Last 5 Encounters:   24 90 kg (198 lb 6.6 oz)   24 90.7 kg (200 lb)   24 74.8 kg (165 lb)   24 77.1 kg (170 lb)   24 80.7 kg (178 lb)     Weight of 172# on  noted per EMR weight history; weight appears stable     Weight Change(s) Since Admission:   Wt Readings from Last 1 Encounters:   24 90 kg (198 lb 6.6 oz)   Admit Weight: 90 kg (198 lb 6.6 oz) (24), Weight Method: Estimated    Estimated Needs    Weight Used For Calorie Calculations: 80 kg (176 lb 5.9 oz) (recent weight in EMR used)  Energy Calorie Requirements (kcal): 9360-3068 kcal (1.1-1.2 SF)  Energy Need Method: [place]  Weight Used " For Protein Calculations: 80 kg (176 lb 5.9 oz) (recent weight in EMR used)  Protein Requirements: 64-80 gm (0.8-1.0 gm/kg)  Fluid Requirements (mL): 5033-2096 mL (1 mL/kcal)        Enteral Nutrition     Patient not receiving enteral nutrition at this time.    Parenteral Nutrition     Patient not receiving parenteral nutrition support at this time.    Evaluation of Received Nutrient Intake    Calories: not meeting estimated needs  Protein: not meeting estimated needs    Patient Education     Not applicable.    Nutrition Diagnosis     PES: Inadequate oral intake related to acute illness as evidenced by 50% intake for >5 days (active)     PES: Moderate acute disease or injury related malnutrition related to acute illness as evidenced by less than or equal to 50% needs met for greater than or equal to 5 days, mild fat depletion, and mild muscle depletion. (new)    Nutrition Interventions     Intervention(s): general/healthful diet, modified composition of meals/snacks, commercial beverage, prescription medication, and collaboration with other providers    Goal: Meet greater than 80% of nutritional needs by follow-up. (goal progressing)  Goal: Consume % of oral supplements by follow-up. (goal progressing)    Nutrition Goals & Monitoring     Dietitian will monitor: food and beverage intake, weight, and gastrointestinal profile  Discharge planning:  regular diet with texture modifications per SLP  Nutrition Risk/Follow-Up: moderate (follow-up in 3-5 days)   Please consult if re-assessment needed sooner.

## 2024-10-08 NOTE — PLAN OF CARE
SS[facility] uploaded discharge orders/AVS and clinicals to [facility] via [employer]. Report packet given to nurse.

## 2024-10-08 NOTE — PLAN OF CARE
10/08/24 1219   Final Note   Assessment Type Final Discharge Note   Anticipated Discharge Disposition SNF   Post-Acute Status   Post-Acute Authorization Placement   Post-Acute Placement Status Set-up Complete/Auth obtained  ([facility])   Discharge Delays (!) Ambulance Transport/Facility Transport     Patient transporting via [facility].

## 2024-10-08 NOTE — PLAN OF CARE
Problem: Adult Inpatient Plan of Care  Goal: Plan of Care Review  Outcome: Progressing  Goal: Patient-Specific Goal (Individualized)  Outcome: Progressing  Goal: Absence of Hospital-Acquired Illness or Injury  Outcome: Progressing  Goal: Optimal Comfort and Wellbeing  Outcome: Progressing     Problem: Wound  Goal: Optimal Coping  Outcome: Progressing  Goal: Absence of Infection Signs and Symptoms  Outcome: Progressing     Problem: Fall Injury Risk  Goal: Absence of Fall and Fall-Related Injury  Outcome: Progressing     Problem: Skin Injury Risk Increased  Goal: Skin Health and Integrity  Outcome: Progressing

## 2024-10-08 NOTE — SUBJECTIVE & OBJECTIVE
Interval History: Patient is more alert and awake but remains confused, unaware of where he is, incoherent. Has been complaining of right knee pain.     Review of Systems   Unable to perform ROS: Mental status change     Objective:     Vital Signs (Most Recent):  Temp: 98.5 °F (36.9 °C) (10/07/24 2026)  Pulse: 86 (10/07/24 2028)  Resp: 18 (10/07/24 2028)  BP: 101/68 (10/07/24 2026)  SpO2: 96 % (10/07/24 2028) Vital Signs (24h Range):  Temp:  [97.1 °F (36.2 °C)-98.5 °F (36.9 °C)] 98.5 °F (36.9 °C)  Pulse:  [57-86] 86  Resp:  [14-20] 18  SpO2:  [90 %-98 %] 96 %  BP: (101-176)/(59-82) 101/68     Weight: 90 kg (198 lb 6.6 oz)  Body mass index is 29.3 kg/m².    Intake/Output Summary (Last 24 hours) at 10/7/2024 2234  Last data filed at 10/7/2024 0552  Gross per 24 hour   Intake --   Output 500 ml   Net -500 ml         Physical Exam  Constitutional:       Appearance: Normal appearance.   HENT:      Head: Normocephalic and atraumatic.   Cardiovascular:      Rate and Rhythm: Normal rate and regular rhythm.      Pulses: Normal pulses.   Pulmonary:      Effort: Pulmonary effort is normal.      Breath sounds: Normal breath sounds.   Abdominal:      General: Abdomen is flat. Bowel sounds are normal. There is no distension.      Palpations: Abdomen is soft.      Tenderness: There is no abdominal tenderness.   Musculoskeletal:      Right lower leg: No edema.      Left lower leg: No edema.   Neurological:      General: No focal deficit present.      Mental Status: He is alert. He is disoriented.             Significant Labs: All pertinent labs within the past 24 hours have been reviewed.    Significant Imaging: I have reviewed all pertinent imaging results/findings within the past 24 hours.

## 2024-10-08 NOTE — PROGRESS NOTES
[facility] Medicine  Progress Note    Patient Name: Yusef Dodd  MRN: [id number]  Patient Class: IP- Inpatient   Admission Date: 9/28/2024  Length of Stay: 8 days  Attending Physician: Ayala Villagomez, *  Primary Care Provider: Ayala Villagomez MD        Subjective:     Principal Problem:<principal problem not specified>        HPI:  No notes on file    Overview/Hospital Course:  No notes on file    Interval History: Patient is more alert and awake but remains confused, unaware of where he is, incoherent. Has been complaining of right knee pain.     Review of Systems   Unable to perform ROS: Mental status change     Objective:     Vital Signs (Most Recent):  Temp: 98.5 °F (36.9 °C) (10/07/24 2026)  Pulse: 86 (10/07/24 2028)  Resp: 18 (10/07/24 2028)  BP: 101/68 (10/07/24 2026)  SpO2: 96 % (10/07/24 2028) Vital Signs (24h Range):  Temp:  [97.1 °F (36.2 °C)-98.5 °F (36.9 °C)] 98.5 °F (36.9 °C)  Pulse:  [57-86] 86  Resp:  [14-20] 18  SpO2:  [90 %-98 %] 96 %  BP: (101-176)/(59-82) 101/68     Weight: 90 kg (198 lb 6.6 oz)  Body mass index is 29.3 kg/m².    Intake/Output Summary (Last 24 hours) at 10/7/2024 2236  Last data filed at 10/7/2024 0552  Gross per 24 hour   Intake --   Output 500 ml   Net -500 ml         Physical Exam  Constitutional:       Appearance: Normal appearance.   HENT:      Head: Normocephalic and atraumatic.   Cardiovascular:      Rate and Rhythm: Normal rate and regular rhythm.      Pulses: Normal pulses.   Pulmonary:      Effort: Pulmonary effort is normal.      Breath sounds: Normal breath sounds.   Abdominal:      General: Abdomen is flat. Bowel sounds are normal. There is no distension.      Palpations: Abdomen is soft.      Tenderness: There is no abdominal tenderness.   Musculoskeletal:      Right lower leg: No edema.      Left lower leg: No edema.   Neurological:      General: No focal deficit present.      Mental Status: He is alert.  He is disoriented.             Significant Labs: All pertinent labs within the past 24 hours have been reviewed.    Significant Imaging: I have reviewed all pertinent imaging results/findings within the past 24 hours.    Assessment/Plan:      Dysphagia  ST consulted, recommend small bites/sips to prevent aspiration.       Altered mental status  Remains confused.  He has a known h/o delirium.    CT head negative  Likely hospital induced delirium  On Seroquel 12.5 mg qhs         Gastrointestinal hemorrhage  Low GI bleed, presented with complaints of bleeding per rectum, FOBT positive on admission  GI consulted taken for colonoscopy 09/30 with no acute findings  H/H have remained stable since admission  GI has signed off     Essential hypertension  Blood pressure has been controlled  Continue to monitor   Holding antihypertensives at the time     Stage 2 chronic kidney disease  Creatinine is normal.        VTE Risk Mitigation (From admission, onward)           Ordered     IP VTE HIGH RISK PATIENT  Once         09/29/24 0412     Place sequential compression device  Until discontinued         09/29/24 0412                    Discharge Planning   BRENDA:      Code Status: Full Code   Is the patient medically ready for discharge?:     Reason for patient still in hospital (select all that apply): Treatment  Discharge Plan A: Skilled Nursing Facility                  Ayala Williamson MD  Department of Hospital Medicine   [facility]

## 2024-10-08 NOTE — ASSESSMENT & PLAN NOTE
Remains confused.  He has a known h/o delirium.    CT head negative  Likely hospital induced delirium  On Seroquel 12.5 mg qhs

## 2024-10-08 NOTE — PT/OT/SLP PROGRESS
Occupational Therapy      Patient Name:  Yusef Dodd   MRN:  [id number]    Patient not seen today secondary to Refusal 2/2 pain . Will follow-up as schedule permits, pending d/c.    10/8/2024

## 2024-10-21 NOTE — HOSPITAL COURSE
Mr Landers was admitted due to lower GI bleed, underwent colonosocopy with GI, found to have sigmoid diverticulosis, likely resolved diverticular bleed. Did not present with further episodes of bleeding during this admission. Patient presented with altered mental status during hospital course, CT head was obtained and was negative, labs were significant for hypernatremia and acute kidney injury, patient was started on IV normal saline and both sodium and kidney function improved. Patient continued to be confused and agitated, likely related to hospital delirium. Started on Seroquel which has helped with agitation. Patient's mental status has been waxing and waning throughout hospitalization. Before this admission patient lived alone, unable to take care of himself now family has agreed for patient to go to SNF. CM consulted and patient has been accepted to [facility] today. Stable for discharge. Patient has been seen and examined on day of discharge.

## 2024-10-21 NOTE — ASSESSMENT & PLAN NOTE
Low GI bleed, presented with complaints of bleeding per rectum, FOBT positive on admission  GI consulted taken for colonoscopy 09/30 with no acute findings  H/H stable

## 2024-10-21 NOTE — DISCHARGE SUMMARY
[facility] [address] Medicine  Discharge Summary      Patient Name: Yusef Dodd  MRN: [id number]  [facility]: [id number]  Patient Class: IP- Inpatient  Admission Date: 9/28/2024  Hospital Length of Stay: 9 days  Discharge Date and Time: 10/8/2024  6:17 PM  Attending Physician: No att. providers found   Discharging Provider: Tito Williamson MD  Primary Care Provider: Tito Martinez MD    Primary Care Team: Networked reference to record PCT     HPI:   No notes on file    Procedure(s) (LRB):  COLON (N/A)      Hospital Course:   Mr Landers was admitted due to lower GI bleed, underwent colonosocopy with GI, found to have sigmoid diverticulosis, likely resolved diverticular bleed. Did not present with further episodes of bleeding during this admission. Patient presented with altered mental status during hospital course, CT head was obtained and was negative, labs were significant for hypernatremia and acute kidney injury, patient was started on IV normal saline and both sodium and kidney function improved. Patient continued to be confused and agitated, likely related to hospital delirium. Started on Seroquel which has helped with agitation. Patient's mental status has been waxing and waning throughout hospitalization. Before this admission patient lived alone, unable to take care of himself now family has agreed for patient to go to SNF. CM consulted and patient has been accepted to [facility] today. Stable for discharge. Patient has been seen and examined on day of discharge.      Goals of Care Treatment Preferences:  Code Status: Full Code         Consults:   Consults (From admission, onward)          Status Ordering Provider     Inpatient consult to Registered Dietitian/Nutritionist  Once        Provider:  (Not yet assigned)    Completed TITO MARTINEZ     Inpatient consult to Neurology  Once        Provider:  Jamaal Mckeon MD    Completed TITO MARTINEZ      "Inpatient consult to Gastroenterology  Once        Provider:  Eriberto Cortez MD    Completed KIA LANIER            Neuro  * Altered mental status  Remains confused.  He has a known h/o delirium.    CT head negative  Likely hospital induced delirium  On Seroquel 12.5 mg qhs         Cardiac/Vascular  Essential hypertension  Blood pressure has been controlled  Continue to monitor   Holding antihypertensives at the time     Renal/  Stage 2 chronic kidney disease  Creatinine is normal.      GI  Gastrointestinal hemorrhage  Low GI bleed, presented with complaints of bleeding per rectum, FOBT positive on admission  GI consulted taken for colonoscopy 09/30 with no acute findings  H/H stable       Final Active Diagnoses:    Diagnosis Date Noted POA    PRINCIPAL PROBLEM:  Altered mental status [R41.82] 09/30/2024 No    Moderate malnutrition [E44.0] 10/08/2024 Yes    Dysphagia [R13.10] 10/06/2024 Clinically Undetermined    Encephalopathy [G93.40] 10/02/2024 Yes    Gastrointestinal hemorrhage [K92.2] 09/29/2024 Yes    Stage 2 chronic kidney disease [N18.2] 05/18/2022 Yes     Chronic    Essential hypertension [I10] 05/18/2022 Yes     Chronic      Problems Resolved During this Admission:       Discharged Condition: stable    Disposition: Skilled Nursing Facility    Follow Up:    Patient Instructions:   No discharge procedures on file.    Significant Diagnostic Studies: Labs: CMP No results for input(s): "NA", "K", "CL", "CO2", "GLU", "BUN", "CREATININE", "CALCIUM", "PROT", "ALBUMIN", "BILITOT", "ALKPHOS", "AST", "ALT", "ANIONGAP", "ESTGFRAFRICA", "EGFRNONAA" in the last 48 hours. and CBC No results for input(s): "WBC", "HGB", "HCT", "PLT" in the last 48 hours.    Pending Diagnostic Studies:       None           Medications:  Reconciled Home Medications:      Medication List        START taking these medications      metoprolol tartrate 25 MG tablet  Commonly known as: LOPRESSOR  Take 1 tablet (25 mg total) by mouth " 2 (two) times daily.     QUEtiapine 25 MG Tab  Commonly known as: SEROQUEL  Take 1 tablet (25 mg total) by mouth nightly.            CHANGE how you take these medications      amLODIPine 10 MG tablet  Commonly known as: NORVASC  Take 1 tablet (10 mg total) by mouth every evening.  What changed: when to take this            CONTINUE taking these medications      acetaminophen 500 MG tablet  Commonly known as: TYLENOL  Take 500 mg by mouth every 6 (six) hours as needed for Pain.     allopurinoL 300 MG tablet  Commonly known as: ZYLOPRIM  Take 1 tablet (300 mg total) by mouth once daily.     finasteride 5 mg tablet  Commonly known as: PROSCAR  Take 1 tablet (5 mg total) by mouth once daily.     pravastatin 20 MG tablet  Commonly known as: PRAVACHOL  Take 1 tablet (20 mg total) by mouth once daily.            STOP taking these medications      albuterol 2.5 mg /3 mL (0.083 %) nebulizer solution  Commonly known as: PROVENTIL     aspirin 500 MG tablet     hydrALAZINE 25 MG tablet  Commonly known as: APRESOLINE     levalbuterol 45 mcg/actuation inhaler  Commonly known as: XOPENEX HFA     lisinopriL 40 MG tablet  Commonly known as: PRINIVIL,ZESTRIL     nitrofurantoin (macrocrystal-monohydrate) 100 MG capsule  Commonly known as: MACROBID              Indwelling Lines/Drains at time of discharge:   Lines/Drains/Airways       None                   Time spent on the discharge of patient: > 30 minutes         Ayala Williamson MD  Department of Hospital Medicine  [facility]

## 2024-11-01 ENCOUNTER — OFFICE VISIT (OUTPATIENT)
Dept: NEUROLOGY | Facility: CLINIC | Age: 89
End: 2024-11-01
Payer: MEDICARE

## 2024-11-01 VITALS — HEIGHT: 69 IN | BODY MASS INDEX: 26.36 KG/M2 | WEIGHT: 178 LBS

## 2024-11-01 DIAGNOSIS — F03.918 DEMENTIA WITH BEHAVIORAL DISTURBANCE: Primary | ICD-10-CM

## 2024-11-01 PROCEDURE — 99213 OFFICE O/P EST LOW 20 MIN: CPT | Mod: PBBFAC | Performed by: NURSE PRACTITIONER

## 2024-11-01 PROCEDURE — 99999 PR PBB SHADOW E&M-EST. PATIENT-LVL III: CPT | Mod: PBBFAC,,, | Performed by: NURSE PRACTITIONER

## 2024-11-01 RX ORDER — ESCITALOPRAM OXALATE 5 MG/1
TABLET ORAL
Qty: 90 TABLET | Refills: 3 | Status: SHIPPED | OUTPATIENT
Start: 2024-11-01

## 2024-12-04 NOTE — TELEPHONE ENCOUNTER
ARE THERE ANY OUTSTANDING TASK IN PATIENT'S CHART? NO LABS, PT WAS RECENTLY IN HOSPITAL AT [facility]    IS THERE ANY DOCUMENTATION OF TASK IN CHART?        PLEASE HAVE PATIENT BRING A FULL LIST OR ACTUAL MEDICATIONS TO THE OFFICE VISIT

## (undated) DEVICE — KIT SURGICAL COLON .25 1.1OZ

## (undated) DEVICE — TIP SUCTION YANKAUER

## (undated) DEVICE — SOL IRRI STRL WATER 1000ML

## (undated) DEVICE — UNDERPAD PROTECT PLUS 17X24IN

## (undated) DEVICE — KIT CANIST SUCTION 1200CC

## (undated) DEVICE — ADAPTER DUAL NSL LUER M-M 7FT

## (undated) DEVICE — COLLECTION SPECIMEN NEPTUNE